# Patient Record
Sex: MALE | Race: OTHER | HISPANIC OR LATINO
[De-identification: names, ages, dates, MRNs, and addresses within clinical notes are randomized per-mention and may not be internally consistent; named-entity substitution may affect disease eponyms.]

---

## 2024-09-12 ENCOUNTER — TRANSCRIPTION ENCOUNTER (OUTPATIENT)
Age: 39
End: 2024-09-12

## 2024-09-12 ENCOUNTER — RESULT REVIEW (OUTPATIENT)
Age: 39
End: 2024-09-12

## 2024-09-13 PROBLEM — Z00.00 ENCOUNTER FOR PREVENTIVE HEALTH EXAMINATION: Status: ACTIVE | Noted: 2024-09-13

## 2024-09-14 ENCOUNTER — INPATIENT (INPATIENT)
Facility: HOSPITAL | Age: 39
LOS: 8 days | Discharge: HOME | End: 2024-09-23
Attending: HOSPITALIST | Admitting: STUDENT IN AN ORGANIZED HEALTH CARE EDUCATION/TRAINING PROGRAM
Payer: MEDICAID

## 2024-09-14 ENCOUNTER — TRANSCRIPTION ENCOUNTER (OUTPATIENT)
Age: 39
End: 2024-09-14

## 2024-09-14 VITALS — OXYGEN SATURATION: 96 % | RESPIRATION RATE: 22 BRPM | HEART RATE: 109 BPM

## 2024-09-14 LAB
ALBUMIN SERPL ELPH-MCNC: 3 G/DL — LOW (ref 3.3–5)
ALP SERPL-CCNC: 87 U/L — SIGNIFICANT CHANGE UP (ref 40–120)
ALT FLD-CCNC: 23 U/L — SIGNIFICANT CHANGE UP (ref 10–45)
ANION GAP SERPL CALC-SCNC: 10 MMOL/L — SIGNIFICANT CHANGE UP (ref 5–17)
APTT BLD: 24.7 SEC — SIGNIFICANT CHANGE UP (ref 24.5–35.6)
AST SERPL-CCNC: 15 U/L — SIGNIFICANT CHANGE UP (ref 10–40)
BASE EXCESS BLDA CALC-SCNC: -7.4 MMOL/L — LOW (ref -2–3)
BASE EXCESS BLDA CALC-SCNC: -8.3 MMOL/L — LOW (ref -2–3)
BASOPHILS # BLD AUTO: 0.01 K/UL — SIGNIFICANT CHANGE UP (ref 0–0.2)
BASOPHILS NFR BLD AUTO: 0.1 % — SIGNIFICANT CHANGE UP (ref 0–2)
BILIRUB SERPL-MCNC: 0.2 MG/DL — SIGNIFICANT CHANGE UP (ref 0.2–1.2)
BLD GP AB SCN SERPL QL: NEGATIVE — SIGNIFICANT CHANGE UP
BLD GP AB SCN SERPL QL: NEGATIVE — SIGNIFICANT CHANGE UP
BUN SERPL-MCNC: 59 MG/DL — HIGH (ref 7–23)
CALCIUM SERPL-MCNC: 9.1 MG/DL — SIGNIFICANT CHANGE UP (ref 8.4–10.5)
CHLORIDE SERPL-SCNC: 102 MMOL/L — SIGNIFICANT CHANGE UP (ref 96–108)
CK MB CFR SERPL CALC: 2.2 NG/ML — SIGNIFICANT CHANGE UP (ref 0–6.7)
CK SERPL-CCNC: 193 U/L — SIGNIFICANT CHANGE UP (ref 30–200)
CO2 BLDA-SCNC: 20 MMOL/L — SIGNIFICANT CHANGE UP (ref 19–24)
CO2 BLDA-SCNC: 21 MMOL/L — SIGNIFICANT CHANGE UP (ref 19–24)
CO2 SERPL-SCNC: 20 MMOL/L — LOW (ref 22–31)
CREAT SERPL-MCNC: 3.57 MG/DL — HIGH (ref 0.5–1.3)
EGFR: 21 ML/MIN/1.73M2 — LOW
EOSINOPHIL # BLD AUTO: 0 K/UL — SIGNIFICANT CHANGE UP (ref 0–0.5)
EOSINOPHIL NFR BLD AUTO: 0 % — SIGNIFICANT CHANGE UP (ref 0–6)
GAS PNL BLDA: SIGNIFICANT CHANGE UP
GAS PNL BLDA: SIGNIFICANT CHANGE UP
GLUCOSE BLDC GLUCOMTR-MCNC: 158 MG/DL — HIGH (ref 70–99)
GLUCOSE BLDC GLUCOMTR-MCNC: 230 MG/DL — HIGH (ref 70–99)
GLUCOSE SERPL-MCNC: 173 MG/DL — HIGH (ref 70–99)
HCO3 BLDA-SCNC: 19 MMOL/L — LOW (ref 21–28)
HCO3 BLDA-SCNC: 20 MMOL/L — LOW (ref 21–28)
HCT VFR BLD CALC: 39 % — SIGNIFICANT CHANGE UP (ref 39–50)
HGB BLD-MCNC: 12.7 G/DL — LOW (ref 13–17)
IMM GRANULOCYTES NFR BLD AUTO: 0.6 % — SIGNIFICANT CHANGE UP (ref 0–0.9)
INR BLD: 1.07 — SIGNIFICANT CHANGE UP (ref 0.85–1.18)
LACTATE SERPL-SCNC: 1 MMOL/L — SIGNIFICANT CHANGE UP (ref 0.5–2)
LYMPHOCYTES # BLD AUTO: 0.64 K/UL — LOW (ref 1–3.3)
LYMPHOCYTES # BLD AUTO: 4.8 % — LOW (ref 13–44)
MAGNESIUM SERPL-MCNC: 2.6 MG/DL — SIGNIFICANT CHANGE UP (ref 1.6–2.6)
MCHC RBC-ENTMCNC: 27.4 PG — SIGNIFICANT CHANGE UP (ref 27–34)
MCHC RBC-ENTMCNC: 32.6 GM/DL — SIGNIFICANT CHANGE UP (ref 32–36)
MCV RBC AUTO: 84.1 FL — SIGNIFICANT CHANGE UP (ref 80–100)
MONOCYTES # BLD AUTO: 0.24 K/UL — SIGNIFICANT CHANGE UP (ref 0–0.9)
MONOCYTES NFR BLD AUTO: 1.8 % — LOW (ref 2–14)
NEUTROPHILS # BLD AUTO: 12.31 K/UL — HIGH (ref 1.8–7.4)
NEUTROPHILS NFR BLD AUTO: 92.7 % — HIGH (ref 43–77)
NRBC # BLD: 0 /100 WBCS — SIGNIFICANT CHANGE UP (ref 0–0)
PCO2 BLDA: 44 MMHG — SIGNIFICANT CHANGE UP (ref 35–48)
PCO2 BLDA: 45 MMHG — SIGNIFICANT CHANGE UP (ref 35–48)
PH BLDA: 7.24 — LOW (ref 7.35–7.45)
PH BLDA: 7.25 — LOW (ref 7.35–7.45)
PHOSPHATE SERPL-MCNC: 7.7 MG/DL — HIGH (ref 2.5–4.5)
PLATELET # BLD AUTO: 409 K/UL — HIGH (ref 150–400)
PO2 BLDA: 93 MMHG — SIGNIFICANT CHANGE UP (ref 83–108)
PO2 BLDA: 97 MMHG — SIGNIFICANT CHANGE UP (ref 83–108)
POTASSIUM SERPL-MCNC: 4.6 MMOL/L — SIGNIFICANT CHANGE UP (ref 3.5–5.3)
POTASSIUM SERPL-SCNC: 4.6 MMOL/L — SIGNIFICANT CHANGE UP (ref 3.5–5.3)
PROCALCITONIN SERPL-MCNC: 4.4 NG/ML — HIGH (ref 0.02–0.1)
PROT SERPL-MCNC: 6.7 G/DL — SIGNIFICANT CHANGE UP (ref 6–8.3)
PROTHROM AB SERPL-ACNC: 12.2 SEC — SIGNIFICANT CHANGE UP (ref 9.5–13)
RBC # BLD: 4.64 M/UL — SIGNIFICANT CHANGE UP (ref 4.2–5.8)
RBC # FLD: 12.3 % — SIGNIFICANT CHANGE UP (ref 10.3–14.5)
RH IG SCN BLD-IMP: POSITIVE — SIGNIFICANT CHANGE UP
RH IG SCN BLD-IMP: POSITIVE — SIGNIFICANT CHANGE UP
SAO2 % BLDA: 98.5 % — HIGH (ref 94–98)
SAO2 % BLDA: 99 % — HIGH (ref 94–98)
SODIUM SERPL-SCNC: 132 MMOL/L — LOW (ref 135–145)
TROPONIN T, HIGH SENSITIVITY RESULT: 19 NG/L — SIGNIFICANT CHANGE UP (ref 0–51)
WBC # BLD: 13.28 K/UL — HIGH (ref 3.8–10.5)
WBC # FLD AUTO: 13.28 K/UL — HIGH (ref 3.8–10.5)

## 2024-09-14 PROCEDURE — 71250 CT THORAX DX C-: CPT | Mod: 26

## 2024-09-14 PROCEDURE — 99291 CRITICAL CARE FIRST HOUR: CPT | Mod: GC,25

## 2024-09-14 PROCEDURE — 71045 X-RAY EXAM CHEST 1 VIEW: CPT | Mod: 26

## 2024-09-14 PROCEDURE — 76705 ECHO EXAM OF ABDOMEN: CPT | Mod: 26

## 2024-09-14 PROCEDURE — 93308 TTE F-UP OR LMTD: CPT | Mod: 26

## 2024-09-14 RX ORDER — FENTANYL CITRATE-0.9 % NACL/PF 300MCG/30
0.5 PATIENT CONTROLLED ANALGESIA VIAL INJECTION
Qty: 2500 | Refills: 0 | Status: DISCONTINUED | OUTPATIENT
Start: 2024-09-14 | End: 2024-09-14

## 2024-09-14 RX ORDER — SODIUM CHLORIDE IRRIG SOLUTION 0.9 %
1000 SOLUTION, IRRIGATION IRRIGATION
Refills: 0 | Status: DISCONTINUED | OUTPATIENT
Start: 2024-09-14 | End: 2024-09-23

## 2024-09-14 RX ORDER — PREDNISONE 5 MG/1
2 TABLET ORAL
Refills: 0 | DISCHARGE

## 2024-09-14 RX ORDER — GLUCAGON INJECTION, SOLUTION 0.5 MG/.1ML
1 INJECTION, SOLUTION SUBCUTANEOUS ONCE
Refills: 0 | Status: DISCONTINUED | OUTPATIENT
Start: 2024-09-14 | End: 2024-09-23

## 2024-09-14 RX ORDER — PANTOPRAZOLE SODIUM 40 MG/1
40 TABLET, DELAYED RELEASE ORAL EVERY 24 HOURS
Refills: 0 | Status: DISCONTINUED | OUTPATIENT
Start: 2024-09-14 | End: 2024-09-16

## 2024-09-14 RX ORDER — INSULIN LISPRO 100/ML
VIAL (ML) SUBCUTANEOUS EVERY 4 HOURS
Refills: 0 | Status: DISCONTINUED | OUTPATIENT
Start: 2024-09-14 | End: 2024-09-15

## 2024-09-14 RX ORDER — FENTANYL CITRATE-0.9 % NACL/PF 300MCG/30
0.5 PATIENT CONTROLLED ANALGESIA VIAL INJECTION
Qty: 2500 | Refills: 0 | Status: DISCONTINUED | OUTPATIENT
Start: 2024-09-14 | End: 2024-09-15

## 2024-09-14 RX ORDER — PROPOFOL 10 MG/ML
50 INJECTION, EMULSION INTRAVENOUS
Qty: 1000 | Refills: 0 | Status: DISCONTINUED | OUTPATIENT
Start: 2024-09-14 | End: 2024-09-15

## 2024-09-14 RX ORDER — ALCOHOL ANTISEPTIC PADS
25 PADS, MEDICATED (EA) TOPICAL ONCE
Refills: 0 | Status: DISCONTINUED | OUTPATIENT
Start: 2024-09-14 | End: 2024-09-23

## 2024-09-14 RX ORDER — CHLORHEXIDINE GLUCONATE ORAL RINSE 1.2 MG/ML
1 SOLUTION DENTAL
Refills: 0 | Status: DISCONTINUED | OUTPATIENT
Start: 2024-09-15 | End: 2024-09-15

## 2024-09-14 RX ORDER — ALCOHOL ANTISEPTIC PADS
12.5 PADS, MEDICATED (EA) TOPICAL ONCE
Refills: 0 | Status: DISCONTINUED | OUTPATIENT
Start: 2024-09-14 | End: 2024-09-23

## 2024-09-14 RX ORDER — SODIUM CHLORIDE 0.9 % (FLUSH) 0.9 %
10 SYRINGE (ML) INJECTION
Refills: 0 | Status: DISCONTINUED | OUTPATIENT
Start: 2024-09-14 | End: 2024-09-15

## 2024-09-14 RX ORDER — METFORMIN HCL 500 MG
2 TABLET ORAL
Refills: 0 | DISCHARGE

## 2024-09-14 RX ORDER — CHLORHEXIDINE GLUCONATE ORAL RINSE 1.2 MG/ML
15 SOLUTION DENTAL EVERY 12 HOURS
Refills: 0 | Status: DISCONTINUED | OUTPATIENT
Start: 2024-09-14 | End: 2024-09-15

## 2024-09-14 RX ORDER — POLYVINYL ALCOHOL 1 %
1 DROPS OPHTHALMIC (EYE)
Refills: 0 | Status: DISCONTINUED | OUTPATIENT
Start: 2024-09-14 | End: 2024-09-23

## 2024-09-14 RX ORDER — AMLODIPINE BESYLATE 5 MG
1 TABLET ORAL
Refills: 0 | DISCHARGE

## 2024-09-14 RX ORDER — PROPOFOL 10 MG/ML
40 INJECTION, EMULSION INTRAVENOUS
Qty: 1000 | Refills: 0 | Status: DISCONTINUED | OUTPATIENT
Start: 2024-09-14 | End: 2024-09-14

## 2024-09-14 RX ORDER — DOBUTAMINE HCL 250MG/20ML
2.5 VIAL (ML) INTRAVENOUS
Qty: 500 | Refills: 0 | Status: DISCONTINUED | OUTPATIENT
Start: 2024-09-14 | End: 2024-09-15

## 2024-09-14 RX ORDER — CHLORHEXIDINE GLUCONATE ORAL RINSE 1.2 MG/ML
1 SOLUTION DENTAL
Refills: 0 | Status: DISCONTINUED | OUTPATIENT
Start: 2024-09-14 | End: 2024-09-23

## 2024-09-14 RX ORDER — PROPOFOL 10 MG/ML
39.96 INJECTION, EMULSION INTRAVENOUS
Qty: 1000 | Refills: 0 | Status: DISCONTINUED | OUTPATIENT
Start: 2024-09-14 | End: 2024-09-14

## 2024-09-14 RX ORDER — ALCOHOL ANTISEPTIC PADS
15 PADS, MEDICATED (EA) TOPICAL ONCE
Refills: 0 | Status: DISCONTINUED | OUTPATIENT
Start: 2024-09-14 | End: 2024-09-23

## 2024-09-14 RX ADMIN — Medication 7500 UNIT(S): at 21:53

## 2024-09-14 RX ADMIN — PANTOPRAZOLE SODIUM 40 MILLIGRAM(S): 40 TABLET, DELAYED RELEASE ORAL at 21:52

## 2024-09-14 RX ADMIN — Medication 5.49 MICROGRAM(S)/KG/HR: at 21:33

## 2024-09-14 RX ADMIN — Medication 4: at 23:03

## 2024-09-14 RX ADMIN — PROPOFOL 32.9 MICROGRAM(S)/KG/MIN: 10 INJECTION, EMULSION INTRAVENOUS at 23:02

## 2024-09-14 RX ADMIN — PROPOFOL 26.3 MICROGRAM(S)/KG/MIN: 10 INJECTION, EMULSION INTRAVENOUS at 20:24

## 2024-09-14 NOTE — H&P ADULT - HISTORY OF PRESENT ILLNESS
HPI:    38-year-old male hypertension, DM2, HLD, hepatitis presents to the 9/10 ER for cough for the past 2 weeks. 2wks ago he began having cough and "gurgling" sound in his chest while breathing. Cough has now become productive of yellow sputum that sometimes has a red tint due to sucking on red cough drops, but no overt hemoptysis. Pt has also had runny nose and   subjective fevers during this time, but was afebrile in clinic and in ED. Patient states when he was walking around today he felt short of breath with exertion which is not normal for him so [facility], tested him for [facility] (negative), and sent him to the ER for evaluation. Patient bought a home pulse oximeter that ranged from 89 to 93% this week. Patient states cough is worse with lying flat and on his left side. Patient had a subjective fever at home for the past 4 days. No sick contacts or recent travel. No chest pain, abdominal pain, vomiting, diarrhea, change in urination, headache, dizziness.    In the ED:  Initial vital signs: T: 98.3 F, HR: 107, BP: 173/107, R: 18, SpO2: 93% on RA  ED course:   Labs: significant for WBC 10.83, Hgb 14.8, platelet 327, aPTT 28.7, d-dimer <215, lactic acid 1.4, Na 135, K 3.4, AG 11, BUN 10, Cr 0.7, ,    Imaging:  CXR: patchy infiltrates c/w multifocal pna  EKG:  Medications: Zosyn x1, Vancox1, Mag sulfate 100ml x1  Consults: none    HPI:    38-year-old male hypertension, DM2, HLD, hepatitis presents to the 9/10 ER for cough for the past 2 weeks. 2wks ago he began having cough and "gurgling" sound in his chest while breathing. Cough has now become productive of yellow sputum that sometimes has a red tint due to sucking on red cough drops, but no overt hemoptysis. Pt has also had runny nose and   subjective fevers during this time, but was afebrile in clinic and in ED. Patient states when he was walking around today he felt short of breath with exertion which is not normal for him so [facility], tested him for [facility] (negative), and sent him to the ER for evaluation. Patient bought a home pulse oximeter that ranged from 89 to 93% this week. Patient states cough is worse with lying flat and on his left side. Patient had a subjective fever at home for the past 4 days. No sick contacts or recent travel. No chest pain, abdominal pain, vomiting, diarrhea, change in urination, headache, dizziness, In the ED the patient was found to have a multifocal pneumonia and started on treatment for community-acquired pneumonia. He was initially on nasal cannula and was on 3 L 9/10 but by 9/11 his oxygen requirements were steadily increasing the patient was now on high flow nasal cannula between 60 to 80% FiO2. PaO2 on ABG on 3 L nasal cannula had a PaO2 of 50, however his oxygenation worsened his PaO2 on 80% FiO2 was only 64. CT chest 9/11 demonstrated extensive multifocal consolidations. Given rapid progression and worsening hypoxia, Admitted to [place] ICU due to rapid progression and worsening hypoxia, initially on continuous BIPAP. Given worsening respiratory failure was intubated (9/12). Previously considered for possible transfer to [facility] for ECMO but deferred after improving respiratory status. 9/12 Pt has been weaned off paralytics and s/p proning and   continued to be on mechanical ventilation and vasopressors. Due to patient's age and severe cardiomyopathy w. worsening renal function, transferred to [facility] for continued management, possible cath and LV support if needed.      In the [place] ED:  Initial vital signs: T: 98.3 F, HR: 107, BP: 173/107, R: 18, SpO2: 93% on RA  ED course:   Labs: significant for WBC 10.83, Hgb 14.8, platelet 327, aPTT 28.7, d-dimer <215, lactic acid 1.4, Na 135, K 3.4, AG 11, BUN 10, Cr 0.7, ,    Imaging:  CXR: patchy infiltrates c/w multifocal pna  Medications: Zosyn x1, Vancox1, Mag sulfate 100ml x1  Consults: none    HPI:    38-year-old male hypertension, DM2, HLD, hepatitis presents to the 9/10 ER for cough for the past 2 weeks. 2wks ago he began having cough and "gurgling" sound in his chest while breathing. Cough has now become productive of yellow sputum that sometimes has a red tint due to sucking on red cough drops, but no overt hemoptysis. Pt has also had runny nose and subjective fevers during this time, but was afebrile in clinic and in ED. Patient states when he was walking around today he felt short of breath with exertion which is not normal for him so [facility], tested him for COVID (negative), and sent him to the ER for evaluation. Patient bought a home pulse oximeter that ranged from 89 to 93% this week. Patient states cough is worse with lying flat and on his left side. Patient had a subjective fever at home for the past 4 days. No sick contacts or recent travel. No chest pain, abdominal pain, vomiting, diarrhea, change in urination, headache, dizziness, In the ED the patient was found to have a multifocal pneumonia and started on treatment for community-acquired pneumonia. He was initially on nasal cannula and was on 3 L 9/10 but by 9/11 his oxygen requirements were steadily increasing the patient was now on high flow nasal cannula between 60 to 80% FiO2. PaO2 on ABG on 3 L nasal cannula had a PaO2 of 50, however his oxygenation worsened his PaO2 on 80% FiO2 was only 64. CT chest 9/11 demonstrated extensive multifocal consolidations. Given rapid progression and worsening hypoxia, Admitted to [place] ICU due to rapid progression and worsening hypoxia, initially on continuous BIPAP. Given worsening respiratory failure was intubated (9/12). Previously considered for possible transfer to [facility] for ECMO but deferred after improving respiratory status. 9/12 Pt has been weaned off paralytics and s/p proning 18 hours 9/12-13. Pulm following and thought to have dx of severe CAP; also consider cryptogenic organizing pneumonia vs. PJP - and recommended steroids and antibiotics, Ceftriaxone (9/10- discharge), azithromycin (9/10- 12), bactrim (9/11- 12), IV solumedrol (9/11- discharge). Bronchoscopy 9/13- awaiting results. Labs: HIV test non reactive. Pending sputum cultures, urine cultures, legionella culture- neg.. RVP: negative. HgA1c 12. 1. Galactomannan and beta-D-glucan testing pending. On 9/14, the patient was intubated, sedated, with decreased urine output and anuric. Patient developed cardiogenic shock.Echos showed EF 25% with global left ventricular hypokinesis. Patient requires Levophed and dobutamine for hemodynamic support. Started on an insulin drip for glycemic control. ARF developed likely 2/2 to possible cardiogenic shock. Creatinine 0.6 on 9/10 -> 2.1 -> 3.4 on 9/14.Due to patient's age and severe cardiomyopathy w. worsening renal function, transferred to [facility] for continued management, possible cath and LV support if needed.       In the [place] ED:  Initial vital signs: T: 98.3 F, HR: 107, BP: 173/107, R: 18, SpO2: 93% on RA  ED course:   Labs: significant for WBC 10.83, Hgb 14.8, platelet 327, aPTT 28.7, d-dimer <215, lactic acid 1.4, Na 135, K 3.4, AG 11, BUN 10, Cr 0.7, ,    Imaging:  CXR: patchy infiltrates c/w multifocal pna  Medications: Zosyn x1, Vancox1, Mag sulfate 100ml x1  Consults: none    HPI: Information obtained via chart review. Patient arrived intubated/sedated.    38-year-old male hypertension, DM2, HLD, hepatitis presents to the 9/10 ER for cough for the past 2 weeks. 2wks ago he began having cough and "gurgling" sound in his chest while breathing. Cough has now become productive of yellow sputum that sometimes has a red tint due to sucking on red cough drops, but no overt hemoptysis. Pt has also had runny nose and subjective fevers during this time, but was afebrile in clinic and in ED. Patient states when he was walking around today he felt short of breath with exertion which is not normal for him so [facility], tested him for COVID (negative), and sent him to the ER for evaluation. Patient bought a home pulse oximeter that ranged from 89 to 93% this week. Patient states cough is worse with lying flat and on his left side. Patient had a subjective fever at home for the past 4 days. No sick contacts or recent travel. No chest pain, abdominal pain, vomiting, diarrhea, change in urination, headache, dizziness, In the ED the patient was found to have a multifocal pneumonia and started on treatment for community-acquired pneumonia. He was initially on nasal cannula and was on 3 L 9/10 but by 9/11 his oxygen requirements were steadily increasing the patient was now on high flow nasal cannula between 60 to 80% FiO2. PaO2 on ABG on 3 L nasal cannula had a PaO2 of 50, however his oxygenation worsened his PaO2 on 80% FiO2 was only 64. CT chest 9/11 demonstrated extensive multifocal consolidations. Given rapid progression and worsening hypoxia, Admitted to [place] ICU due to rapid progression and worsening hypoxia, initially on continuous BIPAP. Given worsening respiratory failure was intubated (9/12). Previously considered for possible transfer to [facility] for ECMO but deferred after improving respiratory status. 9/12 Pt has been weaned off paralytics and s/p proning 18 hours 9/12-13. Pulm following and thought to have dx of severe CAP; also consider cryptogenic organizing pneumonia vs. PJP - and recommended steroids and antibiotics, Ceftriaxone (9/10- discharge), azithromycin (9/10- 12), bactrim (9/11- 12), IV solumedrol (9/11- discharge). Bronchoscopy 9/13- awaiting results. Labs: HIV test non reactive. Pending sputum cultures, urine cultures, legionella culture- neg.. RVP: negative. HgA1c 12. 1. Galactomannan and beta-D-glucan testing pending. Decreased urine output and anuric. Patient developed cardiogenic shock. Echo showed EF 25% with global left ventricular hypokinesis. Patient requires Levophed and dobutamine for hemodynamic support. Started on an insulin drip for glycemic control. ARF developed likely 2/2 to possible cardiogenic shock, on dobutamine. Creatinine 0.6 on 9/10 -> 2.1 -> 3.4 on 9/14.Due to patient's age and severe cardiomyopathy w. worsening renal function, transferred to [facility] for continued management, possible cath and LV support if needed.       In the [place] ED:  Initial vital signs: T: 98.3 F, HR: 107, BP: 173/107, R: 18, SpO2: 93% on RA  ED course:   Labs: significant for WBC 10.83, Hgb 14.8, platelet 327, aPTT 28.7, d-dimer <215, lactic acid 1.4, Na 135, K 3.4, AG 11, BUN 10, Cr 0.7, ,    Imaging:  CXR: patchy infiltrates c/w multifocal pna  Medications: Zosyn x1, Vancox1, Mag sulfate 100ml x1  Consults: none    HPI: Information obtained via chart review. Patient arrived intubated/sedated.    38-year-old male hypertension, DM2, HLD, hepatitis presents to the 9/10 ER for cough for the past 2 weeks. 2wks ago he began having cough and "gurgling" sound in his chest while breathing. Cough has now become productive of yellow sputum that sometimes has a red tint due to sucking on red cough drops, but no overt hemoptysis. Pt has also had runny nose and subjective fevers during this time, but was afebrile in clinic and in ED. Patient states when he was walking around today he felt short of breath with exertion which is not normal for him [facility], tested him for COVID (negative), and sent him to the ER for evaluation. Patient bought a home pulse oximeter that ranged from 89 to 93% this week. Patient states cough is worse with lying flat and on his left side. Patient had a subjective fever at home for the past 4 days. No sick contacts or recent travel. No chest pain, abdominal pain, vomiting, diarrhea, change in urination, headache, dizziness, In the ED the patient was found to have a multifocal pneumonia and started on treatment for community-acquired pneumonia. He was initially on nasal cannula and was on 3 L 9/10 but by 9/11 his oxygen requirements were steadily increasing the patient was now on high flow nasal cannula between 60 to 80% FiO2. PaO2 on ABG on 3 L nasal cannula had a PaO2 of 50, however his oxygenation worsened his PaO2 on 80% FiO2 was only 64. CT chest 9/11 demonstrated extensive multifocal consolidations. Given rapid progression and worsening hypoxia, Admitted to [place] ICU due to rapid progression and worsening hypoxia, initially on continuous BIPAP. Given worsening respiratory failure was intubated (9/12). Previously considered for possible transfer to [facility] for ECMO but deferred after improving respiratory status. 9/12 Pt has been weaned off paralytics and s/p proning 18 hours 9/12-13. Pulm following and thought to have dx of severe CAP; also consider cryptogenic organizing pneumonia vs. PJP - and recommended steroids and antibiotics, Ceftriaxone (9/10- discharge), azithromycin (9/10- 12), bactrim (9/11- 12), IV solumedrol (9/11- discharge). Bronchoscopy 9/13- awaiting results. Labs: HIV test non reactive. Pending sputum cultures, urine cultures, legionella culture- neg.. RVP: negative. HgA1c 12. 1. Galactomannan and beta-D-glucan testing pending. Decreased urine output and anuric. Patient developed cardiogenic shock. Echo showed EF 25% with global left ventricular hypokinesis. Patient requires Levophed and dobutamine for hemodynamic support. Started on an insulin drip for glycemic control. ARF developed likely 2/2 to possible cardiogenic shock. Creatinine 0.6 on 9/10 -> 2.1 -> 3.4 on 9/14.Due to patient's age and severe cardiomyopathy w. worsening renal function, transferred to [facility] for continued management, possible cath and LV support if needed.       In the [place] ED:  Initial vital signs: T: 98.3 F, HR: 107, BP: 173/107, R: 18, SpO2: 93% on RA  ED course:   Labs: significant for WBC 10.83, Hgb 14.8, platelet 327, aPTT 28.7, d-dimer <215, lactic acid 1.4, Na 135, K 3.4, AG 11, BUN 10, Cr 0.7, ,    Imaging:  CXR: patchy infiltrates c/w multifocal pna  Medications: Zosyn x1, Vancox1, Mag sulfate 100ml x1   HPI: Information obtained via chart review. Patient arrived intubated/sedated.    38-year-old male hypertension, DM2, HLD, hepatitis presents to the 9/10 ER for cough for the past 2 weeks. 2wks ago he began having cough and "gurgling" sound in his chest while breathing. Cough has now become productive of yellow sputum that sometimes has a red tint due to sucking on red cough drops, but no overt hemoptysis. Pt has also had runny nose and subjective fevers during this time, but was afebrile in clinic and in ED. Patient states when he was walking around today he felt short of breath with exertion which is not normal for him so [facility], tested him for COVID (negative), and sent him to the ER for evaluation. Patient bought a home pulse oximeter that ranged from 89 to 93% this week. Patient states cough is worse with lying flat and on his left side. Patient had a subjective fever at home for the past 4 days. No sick contacts or recent travel. No chest pain, abdominal pain, vomiting, diarrhea, change in urination, headache, dizziness, In the ED the patient was found to have a multifocal pneumonia and started on treatment for community-acquired pneumonia. He was initially on nasal cannula and was on 3 L 9/10 but by 9/11 his oxygen requirements were steadily increasing the patient was now on high flow nasal cannula between 60 to 80% FiO2. PaO2 on ABG on 3 L nasal cannula had a PaO2 of 50, however his oxygenation worsened his PaO2 on 80% FiO2 was only 64. CT chest 9/11 demonstrated extensive multifocal consolidations. Given rapid progression and worsening hypoxia, Admitted to [place] ICU due to rapid progression and worsening hypoxia, initially on continuous BIPAP. Given worsening respiratory failure was intubated (9/12). Previously considered for possible transfer to [facility] for ECMO but deferred after improving respiratory status. 9/12 Pt has been weaned off paralytics and s/p proning 18 hours 9/12-13. Pulm following and thought to have dx of severe CAP; also consider cryptogenic organizing pneumonia vs. PJP - and recommended steroids and antibiotics, Ceftriaxone (9/10- discharge), azithromycin (9/10- 12), bactrim (9/11- 12), IV solumedrol (9/11- discharge). Bronchoscopy 9/13- awaiting results. Labs: HIV test non reactive. Pending sputum cultures, urine cultures, legionella culture- neg.. RVP: negative. HgA1c 12. 1. Galactomannan and beta-D-glucan testing pending. Decreased urine output and anuric. Patient developed cardiogenic shock. Echo showed EF 25% with global left ventricular hypokinesis. Patient requires Levophed and dobutamine for hemodynamic support. Started on an insulin drip for glycemic control. ARF developed likely 2/2 to possible cardiogenic shock. Creatinine 0.6 on 9/10 -> 2.1 -> 3.4 on 9/14.Due to patient's age and severe cardiomyopathy w. worsening renal function, transferred to [facility] for continued management, possible cath and LV support if needed.       In the [place] ED:  Initial vital signs: T: 98.3 F, HR: 107, BP: 173/107, R: 18, SpO2: 93% on RA  ED course:   Labs: significant for WBC 10.83, Hgb 14.8, platelet 327, aPTT 28.7, d-dimer <215, lactic acid 1.4, Na 135, K 3.4, AG 11, BUN 10, Cr 0.7, ,    Imaging:  CXR: patchy infiltrates c/w multifocal pna  Medications: Zosyn x1, Vancox1, Mag sulfate 100ml x1    PCP: Bi HPI: Information obtained via chart review. Patient arrived intubated/sedated.    38-year-old male hypertension, DM2, HLD, hepatitis presents to the 9/10 ER for cough for the past 2 weeks. 2wks ago he began having cough and "gurgling" sound in his chest while breathing. Cough has now become productive of yellow sputum that sometimes has a red tint due to sucking on red cough drops, but no overt hemoptysis. Pt has also had runny nose and subjective fevers during this time, but was afebrile in clinic and in ED. Patient states when he was walking around today he felt short of breath with exertion which is not normal for him [facility], tested him for COVID (negative), and sent him to the ER for evaluation. Patient bought a home pulse oximeter that ranged from 89 to 93% this week. Patient states cough is worse with lying flat and on his left side. Patient had a subjective fever at home for the past 4 days. No sick contacts or recent travel. No chest pain, abdominal pain, vomiting, diarrhea, change in urination, headache, dizziness, In the ED the patient was found to have a multifocal pneumonia and started on treatment for community-acquired pneumonia. He was initially on nasal cannula and was on 3 L 9/10 but by 9/11 his oxygen requirements were steadily increasing the patient was now on high flow nasal cannula between 60 to 80% FiO2. PaO2 on ABG on 3 L nasal cannula had a PaO2 of 50, however his oxygenation worsened his PaO2 on 80% FiO2 was only 64. CT chest 9/11 demonstrated extensive multifocal consolidations. Given rapid progression and worsening hypoxia, Admitted to [place] ICU due to rapid progression and worsening hypoxia, initially on continuous BIPAP. Given worsening respiratory failure was intubated (9/12). Previously considered for possible transfer to [facility] for ECMO but deferred after improving respiratory status. 9/12 Pt has been weaned off paralytics and s/p proning 18 hours 9/12-13. Pulm following and thought to have dx of severe CAP; also consider cryptogenic organizing pneumonia vs. PJP - and recommended steroids and antibiotics, Ceftriaxone (9/10- discharge), azithromycin (9/10- 12), bactrim (9/11- 12), IV solumedrol (9/11- discharge). Bronchoscopy 9/13- awaiting results. Labs: HIV test non reactive. Pending sputum cultures, urine cultures, legionella culture- neg.. RVP: negative. HgA1c 12. 1. Galactomannan and beta-D-glucan testing pending. Decreased urine output and anuric. Patient developed cardiogenic shock. Echo showed EF 25% with global left ventricular hypokinesis. Patient requires Levophed and dobutamine for hemodynamic support. Started on an insulin drip for glycemic control. ARF developed likely 2/2 to possible cardiogenic shock. Creatinine 0.6 on 9/10 -> 2.1 -> 3.4 on 9/14.Due to patient's age and severe cardiomyopathy w. worsening renal function, transferred to [facility] for continued management, possible cath and LV support if needed.       In the [place] ED:  Initial vital signs: T: 98.3 F, HR: 107, BP: 173/107, R: 18, SpO2: 93% on RA  ED course:   Labs: significant for WBC 10.83, Hgb 14.8, platelet 327, aPTT 28.7, d-dimer <215, lactic acid 1.4, Na 135, K 3.4, AG 11, BUN 10, Cr 0.7, ,    Imaging:  CXR: patchy infiltrates c/w multifocal pna  Medications: Zosyn x1, Vancox1, Mag sulfate 100ml x1    PCP: Bi [phone] HPI: Information obtained via chart review. Patient arrived intubated/sedated.    38-year-old male hypertension, DM2, HLD, hepatitis presents to the 9/10 ER for cough for the past 2 weeks. 2wks ago he began having cough and "gurgling" sound in his chest while breathing. Cough has now become productive of yellow sputum that sometimes has a red tint due to sucking on red cough drops, but no overt hemoptysis. Pt has also had runny nose and subjective fevers during this time, but was afebrile in clinic and in ED. Patient states when he was walking around today he felt short of breath with exertion which is not normal for him so [facility], tested him for COVID (negative), and sent him to the ER for evaluation. Patient bought a home pulse oximeter that ranged from 89 to 93% this week. Patient states cough is worse with lying flat and on his left side. Patient had a subjective fever at home for the past 4 days. No sick contacts or recent travel. No chest pain, abdominal pain, vomiting, diarrhea, change in urination, headache, dizziness, In the ED the patient was found to have a multifocal pneumonia and started on treatment for community-acquired pneumonia. He was initially on nasal cannula and was on 3 L 9/10 but by 9/11 his oxygen requirements were steadily increasing the patient was now on high flow nasal cannula between 60 to 80% FiO2. PaO2 on ABG on 3 L nasal cannula had a PaO2 of 50, however his oxygenation worsened his PaO2 on 80% FiO2 was only 64. CT chest 9/11 demonstrated extensive multifocal consolidations. Given rapid progression and worsening hypoxia, Admitted to [place] ICU due to rapid progression and worsening hypoxia, initially on continuous BIPAP. Given worsening respiratory failure was intubated (9/12). Previously considered for possible transfer to [facility] for ECMO but deferred after improving respiratory status. 9/12 Pt has been weaned off paralytics and s/p proning 18 hours 9/12-13. Pulm following and thought to have dx of severe CAP; also consider cryptogenic organizing pneumonia vs. PJP - and recommended steroids and antibiotics, Ceftriaxone (9/10- discharge), azithromycin (9/10- 12), bactrim (9/11- 12), IV solumedrol (9/11- discharge). Bronchoscopy 9/13- awaiting results. Labs: HIV test non reactive. Pending sputum cultures, urine cultures, legionella culture- neg.. RVP: negative. HgA1c 12. 1. Galactomannan and beta-D-glucan testing pending. Reintubated 9/14, per primary team, decreased urine output and anuric. Patient developed cardiogenic shock. Echo showed EF 25% with global left ventricular hypokinesis. Patient requires Levophed and dobutamine for hemodynamic support. Started on an insulin drip for glycemic control. ARF developed likely 2/2 to possible cardiogenic shock. Creatinine 0.6 on 9/10 -> 2.1 -> 3.4 on 9/14.Due to patient's age and severe cardiomyopathy w. worsening renal function, transferred to [facility] for continued management, possible cath and LV support if needed.       In the [place] ED:  Initial vital signs: T: 98.3 F, HR: 107, BP: 173/107, R: 18, SpO2: 93% on RA  ED course:   Labs: significant for WBC 10.83, Hgb 14.8, platelet 327, aPTT 28.7, d-dimer <215, lactic acid 1.4, Na 135, K 3.4, AG 11, BUN 10, Cr 0.7, ,    Imaging:  CXR: patchy infiltrates c/w multifocal pna  Medications: Zosyn x1, Vancox1, Mag sulfate 100ml x1    PCP: Bi [phone] HPI: Information obtained via chart review. Patient arrived intubated/sedated.    38yoM with a PMHx of HTN, DM2 (uncontrolled, A1c 12), HLD, hepatitis initially presented to  9/10 ER for cough for the past 2 weeks. 2wks ago he began having cough and "gurgling" sound in his chest while breathing. Cough has now become productive of yellow sputum that sometimes has a red tint due to sucking on red cough drops, but no overt hemoptysis. Pt has also had runny nose and subjective fevers during this time, but was afebrile in clinic and in ED. Patient states when he was walking around today he felt short of breath with exertion which is not normal for him so [facility], tested him for COVID (negative), and sent him to the ER for evaluation. Patient bought a home pulse oximeter that ranged from 89 to 93% this week. Patient states cough is worse with lying flat and on his left side. Patient had a subjective fever at home for the past 4 days. No sick contacts or recent travel. No chest pain, abdominal pain, vomiting, diarrhea, change in urination, headache, dizziness, In the ED the patient was found to have a multifocal pneumonia and started on treatment for community-acquired pneumonia. He was initially on nasal cannula and was on 3 L 9/10 but by 9/11 his oxygen requirements were steadily increasing the patient was now on high flow nasal cannula between 60 to 80% FiO2. PaO2 on ABG on 3 L nasal cannula had a PaO2 of 50, however his oxygenation worsened his PaO2 on 80% FiO2 was only 64. CT chest 9/11 demonstrated extensive multifocal consolidations. Given rapid progression and worsening hypoxia, Admitted to [facility] ICU due to rapid progression and worsening hypoxia, initially on continuous BIPAP. Given worsening respiratory failure was intubated (9/12). Previously considered for possible transfer to [facility] for ECMO but deferred after improving respiratory status. 9/12 Pt has been weaned off paralytics and s/p proning 18 hours 9/12-13. Pulm following and thought to have dx of severe CAP; also consider cryptogenic organizing pneumonia vs. PJP - and recommended steroids and antibiotics, Ceftriaxone (9/10- discharge), azithromycin (9/10- 12), bactrim (9/11- 12), IV solumedrol (9/11- discharge). Bronchoscopy 9/13- awaiting results. Labs: HIV test non reactive. Pending sputum cultures, urine cultures, legionella culture- neg.. RVP: negative. HgA1c 12. 1. Galactomannan and beta-D-glucan testing pending. Reintubated 9/14, per primary team, decreased urine output and anuric. Patient developed cardiogenic shock. Echo showed EF 25% with global left ventricular hypokinesis. Patient requires Levophed and dobutamine for hemodynamic support. Started on an insulin drip for glycemic control. ARF developed likely 2/2 to possible cardiogenic shock. Creatinine 0.6 on 9/10 -> 2.1 -> 3.4 on 9/14.Due to patient's age and severe cardiomyopathy w. worsening renal function, transferred to [facility] for continued management, possible cath and LV support if needed.       In the [facility] ED:  Initial vital signs: T: 98.3 F, HR: 107, BP: 173/107, R: 18, SpO2: 93% on RA  ED course:   Labs: significant for WBC 10.83, Hgb 14.8, platelet 327, aPTT 28.7, d-dimer <215, lactic acid 1.4, Na 135, K 3.4, AG 11, BUN 10, Cr 0.7, ,    Imaging:  CXR: patchy infiltrates c/w multifocal pna  Medications: Zosyn x1, Vancox1, Mag sulfate 100ml x1    PCP: Bi [phone] HPI: Information obtained via chart review. Patient arrived intubated/sedated.    38yoM with a PMHx of HTN, DM2 (uncontrolled, A1c 12), HLD, hepatitis initially presented to [facility] 9/10 ER for cough for 2 weeks. Reported cough productive of yellow sputum, runny nose, subjective fevers as well and PERRY. [facility], tested him for COVID (negative), and sent him to the ER. Patient bought a home pulse oximeter that ranged from 89 to 93% this week. In the ED, the patient was found to have a multifocal pneumonia and started on treatment for CAP. He was initially on 3LNC on 9/10 but on  O2 req increased > HFNC. CT Chest  extensive multifocal consolidations > transferred to ICU > BiPAP > intubated . Treated with ARDS protocol, paralyzed, proned -. Pulm consulted, ddx severe CAP vs  vs PJP. Treated with CTX 9/10-, azithro 9/10-, bactrim -, iv solumedrol -. Bronch , pending results. HIV neg. Reintubated  for ??. Also noted to have decreased UOP and ?anuric. Course further c/b newly reduced EF 25% and cardiogenic shock req dobutamine.  PCP: Bi [phone]    Upon admission to [facility]:   Vitals: T__, , /75, RR 22, 96% on vent FiO2 50//RR 18/PEEP 5  Labs: WBC 13.28, Hb 12.7, Na 132, BUN 59, Cr 3.57, Procal 4.4  AB.24/44/19

## 2024-09-14 NOTE — H&P ADULT - ATTENDING COMMENTS
Patient transfer from [place] originally admitted for severe ARDS with now new reduction in cardiac function. US on admission shows bilateral A line pattern with minimal consolidations at the bases. Oxygenation is much improved with cardiac funciton more moderately reduced on 2.5 of dobutamine. Hess was found to be blocked so exchanged with output of moderate amount of urine. Will plan to repeat CT scan but based on imagining and rapid reponse to steroids favor a process such as acute eosinphilic pneumonia but will repeat CTD workup and consider bronchoscopy based on results of CT scan and clinical picture. Continue 1mg/kg steroids

## 2024-09-14 NOTE — H&P ADULT - NSHPPHYSICALEXAM_GEN_ALL_CORE
GENERAL: intubated/sedated  HEENT: Atraumatic, normocephalic  NEURO:  A&Ox0, no focal deficits  LUNGS: diminished lung sounds  HEART: RRR  ABD: Soft, non-tender, non-distended, no organomegaly, no appreciable masses, +bs all 4 quadrants  EXTREMITIES: Nontender, +1 pitting edema  SKIN: No rashes or lesions

## 2024-09-14 NOTE — H&P ADULT - NSHPLABSRESULTS_GEN_ALL_CORE
.  LABS:                         12.7   13.28 )-----------( 409      ( 14 Sep 2024 19:04 )             39.0           PT/INR - ( 14 Sep 2024 19:04 )   PT: 12.2 sec;   INR: 1.07          PTT - ( 14 Sep 2024 19:04 )  PTT:24.7 sec          Lactate, Blood: 1.0 mmol/L (09-14 @ 19:04)      RADIOLOGY, EKG & ADDITIONAL TESTS: Reviewed.

## 2024-09-14 NOTE — H&P ADULT - NSICDXPASTMEDICALHX_GEN_ALL_CORE_FT
PAST MEDICAL HISTORY:  Alcohol use     Diabetes     HLD (hyperlipidemia)     HTN (hypertension)

## 2024-09-14 NOTE — H&P ADULT - ASSESSMENT
Patient is 38yMale with PMHx of *** who presents with ***.     NEURO  #intubated and sedated   - plan for extubation tomorrow AM    CV  #      PULM  #  - CT scan without contrast   - f/u anca and immunoglobulin labs        RENAL  #  #Hess      GI  #      ENDO  #      ID  #      HEME/ONC  #      ID:  #      SKIN:  #Lines:      PREVENTIVE   F:  E:  N:  GI:  DVT:  DISPO:       38-year-old male hypertension, DM2, HLD, hepatitis presents with acute hypoxic respiratory failure 2/2 pneumonia, cardiogenic shock, and DIANDRA from [facility] for continued management.     NEURO  #intubated and sedated   - plan for extubation tomorrow AM    CV  #cardiogenic shock  Echo showed EF 25% with global left ventricular hypokinesis. Patient requires Levophed and dobutamine  - Repeat TTE  - Continue levophed and dobutamine 2.5mg, wean as tolerated; plan to dc tomorow AM    PULM  #acute hypoxic respiratory failure 2/2 pneumonia vs   - CT scan without contrast   - f/u anca and immunoglobulin labs  - hold antibiotics at this time      RENAL  #DIANDRA  #Hess exchanged 9/14      GI  #place NG tube  - Consider starting feeds  - PPI       ENDO  #DM, A1c 12%  - discontinue insulin gtt  - FSG q6hrs      ID  HATTIE      HEME/ONC  #      ID:  #      SKIN:  #Lines:      PREVENTIVE   F:  E:  N:  GI: PPI  DVT: heparin subq  DISPO:       38-year-old male hypertension, DM2, HLD, hepatitis presents with acute hypoxic respiratory failure 2/2 pneumonia, cardiogenic shock, and DIANDRA from [facility] for continued management.     NEURO  #intubated and sedated   - plan for extubation tomorrow AM    CV  #cardiogenic shock  Echo showed EF 25% with global left ventricular hypokinesis. Patient requires Levophed and dobutamine  - Repeat TTE  - Continue levophed and dobutamine 2.5mg, wean as tolerated; plan to dc tomorow AM    #HTN  Per chart review, home lisinopril 40mg and HCTZ but has not taken for past 2 months    #HLD  Per chart review, on a statin (unknown which one) but not taking past 2 months.    PULM  #acute hypoxic respiratory failure 2/2 pneumonia vs   - CT scan without contrast   - f/u anca and immunoglobulin labs  - hold antibiotics at this time      RENAL  #DIANDRA  #Hess exchanged 9/14      GI  #place NG tube  - Consider starting feeds  - PPI       ENDO  #DM  [place] Admission A1c 12%  Per chart review, Home metformin 1000mg BID but has not taken for past 2 months  - discontinue insulin gtt  - FSG q6hrs      ID  HATTIE      HEME/ONC  #DVT prophylaxis   - heparin subq        SKIN:  #Lines:      PREVENTIVE   F: as needed  E: Replete if K<4, Mg<2  N: NPO, possibly start tube feeds  GI: PPI  DVT: heparin subq  DISPO: MICU       38-year-old male hypertension, DM2, HLD, hepatitis presents with acute hypoxic respiratory failure 2/2 pneumonia, cardiogenic shock, and DIANDRA from [facility] for continued management.     NEURO  #intubated and sedated   - plan for extubation tomorrow AM    CV  #cardiogenic shock  Echo showed EF 25% with global left ventricular hypokinesis. Patient requires Levophed and dobutamine  - Repeat TTE  - Continue levophed and dobutamine 2.5mg, wean as tolerated; plan to dc tomorow AM    #HTN  Per chart review, home lisinopril 40mg and HCTZ 25mg but has not taken for past 2 months    #HLD  Per chart review, on a statin (unknown which one) but not taking past 2 months.    PULM  #acute hypoxic respiratory failure   - CT scan without contrast   - f/u anca and immunoglobulin labs, blood parasites, stool parasite, strongyloides antibodies  - hold antibiotics at this time      RENAL  #DIANDRA  #Hess exchanged 9/14      GI  #place NG tube  - Consider starting feeds  - PPI       ENDO  #DM  [place] Admission A1c 12%  Per chart review, Home metformin 1000mg BID but has not taken for past 2 months  - discontinue insulin gtt  - FSG q6hrs      ID  HATTIE      HEME/ONC  #DVT prophylaxis   - heparin subq  - maintain active T&S  - transfuse if Hgb <7        SKIN:  #Lines:      PREVENTIVE   F: as needed  E: Replete if K<4, Mg<2  N: NPO, possibly start tube feeds  GI: PPI  DVT: heparin subq  DISPO: MICU       38-year-old male hypertension, DM2, HLD, hepatitis presents with acute hypoxic respiratory failure 2/2 hypersensitivity pneumonitis vs eosinophilic pneumonia, cardiogenic shock, and DIANDRA from [facility] for continued management.     NEURO  #intubated and sedated , on propofol and fentanyl.  - plan for extubation tomorrow AM    CV  #cardiogenic shock  Echo showed EF 25% with global left ventricular hypokinesis.  On levophed and dobutamine  9/14 POCUS with global hypokinesis  - Repeat TTE  - Continue levophed and dobutamine 2.5mg, wean as tolerated; plan to dc tomorow AM  - Goal MAP >65    #HTN  Per chart review, home lisinopril 40mg and HCTZ 25mg but has not taken for past 2 months    #HLD  Per chart review, on a statin (unknown which one) but not taking past 2 months.    PULM  #acute hypoxic respiratory failure 2/2 hypersensitivity pneumonitis vs eosinophilic pneumonia,  - CT scan without contrast (no contrast due to elevated Cr)  - f/u anca and immunoglobulin labs, blood parasites, stool parasite, strongyloides antibodies  - hold antibiotics at this time      RENAL  #DIANDRA  #Hess exchanged 9/14      GI  #place NG tube  - Consider starting feeds  - PPI       ENDO  #DM  [place] Admission A1c 12%  Per chart review, Home metformin 1000mg BID but has not taken for past 2 months  - discontinue insulin gtt, no indication for DKA  -mISS  - FSG q6hrs      ID  HATTIE      HEME/ONC  #DVT prophylaxis   - heparin subq  - maintain active T&S  - transfuse if Hgb <7        SKIN:  #Lines:      PREVENTIVE   F: as needed  E: Replete if K<4, Mg<2  N: NPO, possibly start tube feeds  GI: PPI  DVT: heparin subq  DISPO: MICU       38-year-old male hypertension, DM2, HLD, hepatitis presents with acute hypoxic respiratory failure 2/2 hypersensitivity pneumonitis vs eosinophilic pneumonia, cardiogenic shock, and DIANDRA from [facility] for continued management.     NEURO  #intubated and sedated , on propofol and fentanyl.  - will wean sedation in AM and plan for extubation tomorrow AM    CV  #cardiogenic shock  Echo showed EF 25% with global left ventricular hypokinesis.   On levophed and dobutamine  9/14 POCUS with global hypokinesis  - Formal TTE  - Continue levophed and dobutamine 2.5mg, wean as tolerated; plan to dc tomorrow AM  - Goal MAP >65    #HTN  Per chart review, home lisinopril 40mg and HCTZ 25mg but has not taken for past 2 months  - holding home meds     #HLD  Per chart review, on a statin (unknown which one) but not taking past 2 months.  - holding home meds    PULM  #acute hypoxic respiratory failure 2/2 hypersensitivity pneumonitis vs eosinophilic pneumonia  Pt initially presented to [facility] on 9/10 for SOB, PERRY, cough x 2 weeks. Initially requiring NC > HFNC > BiPAP > intubated on   - CT scan without contrast (no contrast due to elevated Cr)  - f/u anca and immunoglobulin labs, blood parasites, stool parasite, strongyloides antibodies  - hold antibiotics at this time      RENAL  #DIANDRA  #Hess exchanged 9/14  - strict is/os    GI  #place NG tube  - Consider starting feeds  - PPI       ENDO  #DM  [place] Admission A1c 12%  Per chart review, Home metformin 1000mg BID but has not taken for past 2 months  - discontinue insulin gtt, no indication for DKA  -mISS  - FSG q4hrs      ID  HATTIE      HEME/ONC  #DVT prophylaxis   - heparin subq  - maintain active T&S  - transfuse if Hgb <7      SKIN:  #Lines:      PREVENTIVE   F: as needed  E: Replete if K<4, Mg<2  N: NPO, possibly start tube feeds  GI: PPI  DVT: heparin subq  DISPO: MICU       38-year-old male hypertension, DM2, HLD, hepatitis presents with acute hypoxic respiratory failure 2/2 hypersensitivity pneumonitis vs eosinophilic pneumonia, cardiogenic shock, and DIANDRA from [facility] for continued management.     NEURO  #intubated and sedated , on propofol and fentanyl.  - will wean sedation in AM and plan for extubation tomorrow AM    CV  #cardiogenic shock  While at [facility] patient was found  to have EF 25% on TTE  with global left ventricular hypokinesis. Initially placed on levophed and dobutamine, Transferred to [facility] for possible cath on Dobutamine   ddx include ischemic cardiomyopathy vs eosinophilic myocardiosis given high suspension for eosinophilic pneumonia vs alcohol induced cardiomyopathy given history of alcohol use.   POCUS on admission 9/14 showed moderate to severe LV dysfunction with global hypokinesis    - Obtian formal TTE  - Continue dobutamine 2.5mg, wean as tolerated  - Goal MAP >65    #HTN  Per chart review, home lisinopril 40mg and HCTZ 25mg but has not taken for past 2 months  - holding home meds iso cardiogenic shock      PULM  #acute hypoxic respiratory failure   2/2 hypersensitivity pneumonitis vs eosinophilic pneumonia  Patient  initially presented to [facility] on 9/10 for SOB, PERRY, cough x 2 weeks. Found to have increasing O2 requirements initially placed no NC > HFNC > BiPAP, eventually requiring intubation, was proned and paralyzed per ARDS protocol  CT- chest significant for Multi- focal pneumonia. Of  note patient was treated with steroids, noted to improve rapidly after treatment, which highly concerning for hypersensitivity pneumonitis vs eosinophilic pneumonia  Patient was treated with CTX 9/10-9/14, Azithro 9/10-9/12, Bactrim 9/11-9/12, iv solumedrol 9/11-9/14 at OSH.   s/p Bronchoscopy 9/13.   repeat CT- chest with improved bilateral infiltrates  Now oxygenating well and not meeting ARDS criteria.   concerned for cryptogenic pna.     - f/u Aldolase, ANCA, RAMAKRISHNA, aspergillus, blood parasite, cocci abs, anti-ccp, FENG, dsDNA, fungitell, histoplasmosis antigen, hypersensitivity  pneumonitis panel, IgE , Anti geoffrey-1, myomarker , ,RF, scleroderma antibodies, sjogren' s antibodies,, stool parasite, strongyloides antibodies  - holding abx at this time.  -c/w  methylprednisolone 50iv bid       RENAL  #DIANDRA  #Hess exchanged 9/14  At OSH noted to have increase in BUN and Cr, concern for anuria however, since admission to [facility] patient has been making urine appropriately.  Suspect prerenal iso cardiogenic shock, plus minus septic shock   - f/u urine lytes  - strict is/os    GI  Plan to extubate in am and start po diet  - c/w PPI       ENDO  #DM  Poorly controlled   A1c 12% during [place] Admission   Per chart review, Home metformin 1000mg BID but has not taken for past 2 months  - discontinue insulin gtt, no indication for DKA  -mISS  - FSG q4hrs    #HLD  Per chart review, on a statin (unknown which one) but not taking past 2 months.  - holding home meds    ID  HATTIE    HEME/ONC  #DVT prophylaxis   - heparin subq  - maintain active T&S  - transfuse if Hgb <7      SKIN:  #Lines:       PREVENTIVE   F: as needed  E: Replete if K<4, Mg<2  N: NPO, possibly start tube feeds  GI: PPI  DVT: heparin subq  DISPO: MICU       38-year-old male hypertension, DM2, HLD, hepatitis presents with acute hypoxic respiratory failure 2/2 hypersensitivity pneumonitis vs eosinophilic pneumonia, cardiogenic shock, and DIANDRA from [facility] for continued management.     NEURO  #intubated and sedated , on propofol and fentanyl.  - will wean sedation in AM and plan for extubation tomorrow AM    CV  #cardiogenic shock  While at [facility] patient was found  to have EF 25% on TTE  with global left ventricular hypokinesis. Initially placed on levophed and dobutamine, Transferred to [facility] for possible cath on Dobutamine   ddx include ischemic cardiomyopathy vs eosinophilic myocardiosis given high suspension for eosinophilic pneumonia vs alcohol induced cardiomyopathy given history of alcohol use.   POCUS on admission 9/14 showed moderate to severe LV dysfunction with global hypokinesis    - Obtian formal TTE  - Continue dobutamine 2.5mg, wean as tolerated  - Goal MAP >65    #HTN  Per chart review, home lisinopril 40mg and HCTZ 25mg but has not taken for past 2 months  - holding home meds iso cardiogenic shock      PULM  #acute hypoxic respiratory failure   2/2 hypersensitivity pneumonitis vs eosinophilic pneumonia  Patient  initially presented to [facility] on 9/10 for SOB, PERRY, cough x 2 weeks. Found to have increasing O2 requirements initially placed on NC > HFNC > BiPAP, eventually requiring intubation, was proned 9/12-9/13 and paralyzed per ARDS protocol  CT- chest significant for Multi- focal pneumonia. Of  note patient was treated with steroids, was noted to improve rapidly after treatment, which was highly concerning for hypersensitivity pneumonitis vs eosinophilic pneumonia  Patient was treated with CTX 9/10-9/14, Azithro 9/10-9/12, Bactrim 9/11-9/12, iv solumedrol 9/11-9/14 at OSH.   s/p Bronchoscopy 9/13.   Repeat CT- chest with improved bilateral infiltrates  Now oxygenating well and not meeting ARDS criteria.     - f/u Aldolase, ANCA, RAMAKRISHNA, aspergillus, blood parasite, coccoidiodes antibodies, Anti-ccp, FENG, dsDNA, fungitell, histoplasma antigen, hypersensitivity  pneumonitis panel, IgE , Anti geoffrey-1, Myomarker panel ,RF, scleroderma antibodies, sjogren' s antibodies, stool parasite, strongyloides antibodies, toxocara antibodies. RVP.  - holding abx at this time.  -c/w  methylprednisolone 50iv bid       RENAL  #DIANDRA  #Hess exchanged 9/14  At OSH noted to have increase in BUN and Cr, concern for anuria however, since admission to [facility] patient has been making urine appropriately.  Suspect prerenal iso cardiogenic shock, plus minus septic shock   - f/u urine lytes  - strict is/os    GI  Plan to extubate in am and start po diet  - c/w PPI       ENDO  #DM  Poorly controlled   A1c 12% during [place] Admission   Per chart review, Home metformin 1000mg BID but has not taken for past 2 months  - discontinue insulin gtt, no indication for DKA  -mISS  - FSG q4hrs    #HLD  Per chart review, on a statin (unknown which one) but not taking past 2 months.  - holding home meds    ID  HATTIE    HEME/ONC  #DVT prophylaxis   - heparin subq  - maintain active T&S  - transfuse if Hgb <7      SKIN:  #Lines:       PREVENTIVE   F: as needed  E: Replete if K<4, Mg<2  N: NPO, possibly start tube feeds  GI: PPI  DVT: heparin subq  DISPO: MICU       38-year-old male hypertension, DM2, HLD, hepatitis presents with acute hypoxic respiratory failure 2/2 hypersensitivity pneumonitis vs eosinophilic pneumonia, cardiogenic shock, and DIANDRA from [facility] for continued management.     NEURO  #intubated and sedated , on propofol and fentanyl.  - will wean sedation in AM and plan for extubation tomorrow AM    CV  #cardiogenic shock  While at [facility] patient was found  to have EF 25% on TTE  with global left ventricular hypokinesis. Initially placed on levophed and dobutamine, Transferred to [facility] for possible cath on Dobutamine   ddx include ischemic cardiomyopathy vs eosinophilic myocardiosis given high suspension for eosinophilic pneumonia vs alcohol induced cardiomyopathy given history of alcohol use.   POCUS on admission 9/14 showed moderate to severe LV dysfunction with global hypokinesis    - Obtian formal TTE  - Continue dobutamine 2.5mg, wean as tolerated  - Goal MAP >65    #HTN  Per chart review, home lisinopril 40mg and HCTZ 25mg but has not taken for past 2 months  - holding home meds iso cardiogenic shock      PULM  #acute hypoxic respiratory failure   2/2 hypersensitivity pneumonitis vs eosinophilic pneumonia  Patient  initially presented to [facility] on 9/10 for SOB, PERRY, cough x 2 weeks. Found to have increasing O2 requirements initially placed on NC > HFNC > BiPAP, eventually requiring intubation, was proned 9/12-9/13 and paralyzed per ARDS protocol  CT- chest significant for Multi- focal pneumonia. Of  note patient was treated with steroids, was noted to improve rapidly after treatment, which was highly concerning for hypersensitivity pneumonitis vs eosinophilic pneumonia  Patient was treated with CTX 9/10-9/14, Azithro 9/10-9/12, Bactrim 9/11-9/12, iv solumedrol 9/11-9/14 at OSH.   s/p Bronchoscopy 9/13.   Repeat CT- chest with improved bilateral infiltrates  Now oxygenating well and not meeting ARDS criteria.     - f/u Aldolase, ANCA, RAMAKRISHNA, aspergillus, blood parasite, coccidioides antibodies, Anti-ccp, FENG, dsDNA, fungitell, histoplasma antigen, hypersensitivity  pneumonitis panel, IgE , Anti geoffrey-1, Myomarker panel ,RF, scleroderma antibodies, sjogren' s antibodies, stool parasite, strongyloides antibodies, toxocara antibodies. RVP.  - holding abx at this time.  -c/w  methylprednisolone 50mg IV BID      RENAL  #DIANDRA  #Hess exchanged 9/14  At OSH noted to have increase in BUN and Cr with concern for anuria however, since admission to [facility] patient has been making urine appropriately.  Suspect prerenal iso cardiogenic shock, plus minus septic shock   - f/u urine lytes  - strict is/os    GI  Plan to extubate in am and start po diet  - c/w PPI       ENDO  #DM  Poorly controlled   A1c 12% during [place] Admission   Per chart review, Home metformin 1000mg BID but has not taken for past 2 months  - discontinue insulin gtt, no indication for DKA  -mISS  - FSG q4hrs    #HLD  Per chart review, on a statin (unknown which one) but not taking past 2 months.  - holding home meds    ID  HATTIE    HEME/ONC  #DVT prophylaxis   - heparin subq  - maintain active T&S  - transfuse if Hgb <7      SKIN:  #Lines:       PREVENTIVE   F: as needed  E: Replete if K<4, Mg<2  N: NPO, possibly start tube feeds  GI: PPI  DVT: heparin subq  DISPO: MICU       38-year-old male hypertension, DM2, HLD, hepatitis presents with acute hypoxic respiratory failure 2/2 hypersensitivity pneumonitis vs eosinophilic pneumonia, cardiogenic shock, and DIANDRA from [facility] for continued management.     NEURO  #intubated and sedated , on propofol and fentanyl.  - will wean sedation in AM and plan for extubation tomorrow AM    CV  #cardiogenic shock  While at [facility] patient was found  to have EF 25% on TTE  with global left ventricular hypokinesis. Initially placed on levophed and dobutamine, Transferred to [facility] for possible cath on Dobutamine   ddx include ischemic cardiomyopathy vs eosinophilic myocardiosis given high suspension for eosinophilic pneumonia vs alcohol induced cardiomyopathy given history of alcohol use.   POCUS on admission 9/14 showed moderate to severe LV dysfunction with global hypokinesis    - Obtian formal TTE  - Continue dobutamine 2.5mg, wean as tolerated  - Goal MAP >65    #HTN  Per chart review, home lisinopril 40mg and HCTZ 25mg but has not taken for past 2 months  - holding home meds iso cardiogenic shock      PULM  #acute hypoxic respiratory failure   2/2 hypersensitivity pneumonitis vs eosinophilic pneumonia  Patient  initially presented to [facility] on 9/10 for SOB, PERRY, cough x 2 weeks. Found to have increasing O2 requirements initially placed on NC > HFNC > BiPAP, eventually requiring intubation, was proned 9/12-9/13 and paralyzed per ARDS protocol  CT- chest significant for Multi- focal pneumonia. Of  note patient was treated with steroids, was noted to improve rapidly after treatment, which was highly concerning for hypersensitivity pneumonitis vs eosinophilic pneumonia  Patient was treated with CTX 9/10-9/14, Azithro 9/10-9/12, Bactrim 9/11-9/12, iv solumedrol 9/11-9/14 at OSH.   s/p Bronchoscopy 9/13.   Repeat CT- chest with improved bilateral infiltrates  Now oxygenating well and not meeting ARDS criteria.     - f/u Aldolase, ANCA, RAMAKRISHNA, aspergillus, blood parasite, coccidioides antibodies, Anti-ccp, FENG, dsDNA, fungitell, histoplasma antigen, hypersensitivity  pneumonitis panel, IgE , Anti geoffrey-1, Myomarker panel ,RF, scleroderma antibodies, sjogren' s antibodies, stool parasite, strongyloides antibodies, toxocara antibodies. RVP.  - holding abx at this time.  -c/w  methylprednisolone 50mg IV BID      RENAL  #DIANDRA  #Hess exchanged 9/14  At OSH noted to have increase in BUN and Cr with concern for anuria however, since admission to [facility] patient has been making urine appropriately.  Suspect prerenal in setting of cardiogenic shock with possibly septic shock   - f/u urine lytes  - strict is/os    GI  Plan to extubate in am and start po diet  - c/w PPI       ENDO  #DM  Poorly controlled   A1c 12% during [place] Admission   Per chart review, Home metformin 1000mg BID but has not taken for past 2 months  - discontinue insulin gtt, no indication for DKA  -mISS  - FSG q4hrs    #HLD  Per chart review, on a statin (unknown which one) but not taking past 2 months.  - holding home meds    ID  HATTIE    HEME/ONC  #DVT prophylaxis   - heparin subq  - maintain active T&S  - transfuse if Hgb <7      SKIN:  #Lines:       PREVENTIVE   F: as needed  E: Replete if K<4, Mg<2  N: NPO, possibly start tube feeds  GI: PPI  DVT: heparin subq  DISPO: MICU       38-year-old male hypertension, DM2, HLD, hepatitis presents with acute hypoxic respiratory failure 2/2 hypersensitivity pneumonitis vs eosinophilic pneumonia, cardiogenic shock, and DIANDRA from [facility] for continued management.     NEURO  #intubated and sedated , on propofol and fentanyl.  - will wean sedation in AM and plan for extubation tomorrow AM    CV  #cardiogenic shock  While at [facility] patient was found  to have EF 25% on TTE  with global left ventricular hypokinesis. Initially placed on levophed and dobutamine, Transferred to [facility] for possible cath on Dobutamine   ddx include ischemic cardiomyopathy vs eosinophilic myocardiosis (given high suspension for eosinophilic pneumonia) vs alcohol induced cardiomyopathy (given history of alcohol use).   POCUS on admission 9/14 showed moderate to severe LV dysfunction with global hypokinesis    - Obtian formal TTE  - Continue dobutamine 2.5mg, wean as tolerated  - Goal MAP >65    #HTN  Per chart review, home lisinopril 40mg and HCTZ 25mg but has not taken for past 2 months  - holding home meds iso cardiogenic shock      PULM  #acute hypoxic respiratory failure  Possibly 2/2 hypersensitivity pneumonitis vs eosinophilic pneumonia  Patient  initially presented to [facility] on 9/10 for SOB, PERRY, cough x 2 weeks. Found to have increasing O2 requirements initially placed on NC > HFNC > BiPAP, eventually requiring intubation, was proned 9/12-9/13 and paralyzed per ARDS protocol  CT- chest significant for Multi- focal pneumonia.   Of  note patient was treated with steroids, was noted to improve rapidly after treatment, which was highly concerning for hypersensitivity pneumonitis vs eosinophilic pneumonia  Patient was treated with CTX 9/10-9/14, Azithro 9/10-9/12, Bactrim 9/11-9/12, iv solumedrol 9/11-9/14 at OSH.   s/p Bronchoscopy 9/13.   Repeat CT- chest with improved bilateral infiltrates  Now oxygenating well and not meeting ARDS criteria.     - f/u Aldolase, ANCA, RAMAKRISHNA, aspergillus, blood parasite, coccidioides antibodies, Anti-ccp, FENG, dsDNA, fungitell, histoplasma antigen, hypersensitivity  pneumonitis panel, IgE , Anti geoffrey-1, Myomarker panel ,RF, scleroderma antibodies, sjogren' s antibodies, stool parasite, strongyloides antibodies, toxocara antibodies. RVP.  - holding abx at this time.  -c/w  methylprednisolone 50mg IV BID      RENAL  #DIANDRA  Hess exchanged 9/14  At OSH noted to have increase in BUN and Cr with concern for anuria however, since admission to [facility] patient has been making urine appropriately.  Suspect prerenal in setting of cardiogenic shock with possibly septic shock   - f/u urine lytes  - strict is/os    GI  Plan to extubate in am and start po diet  - c/w PPI       ENDO  #DM  Poorly controlled   A1c 12% during [place] Admission   Per chart review, Home metformin 1000mg BID but has not taken for past 2 months  - discontinue insulin gtt, no indication for DKA  -mISS  - FSG q4hrs    #HLD  Per chart review, on a statin (unknown which one) but not taking past 2 months.  - holding home meds    ID  HATTIE    HEME/ONC  #DVT prophylaxis   - heparin subq  - maintain active T&S  - transfuse if Hgb <7      SKIN:  #Lines:       PREVENTIVE   F: as needed  E: Replete if K<4, Mg<2  N: NPO  GI: PPI  DVT: heparin subq  DISPO: MICU

## 2024-09-15 LAB
A1C WITH ESTIMATED AVERAGE GLUCOSE RESULT: 11.6 % — HIGH (ref 4–5.6)
ALBUMIN SERPL ELPH-MCNC: 2.9 G/DL — LOW (ref 3.3–5)
ALP SERPL-CCNC: 72 U/L — SIGNIFICANT CHANGE UP (ref 40–120)
ALT FLD-CCNC: 19 U/L — SIGNIFICANT CHANGE UP (ref 10–45)
ANION GAP SERPL CALC-SCNC: 10 MMOL/L — SIGNIFICANT CHANGE UP (ref 5–17)
ANION GAP SERPL CALC-SCNC: 13 MMOL/L — SIGNIFICANT CHANGE UP (ref 5–17)
ANTI-RIBONUCLEAR PROTEIN: 0.2 AI — SIGNIFICANT CHANGE UP
AST SERPL-CCNC: 13 U/L — SIGNIFICANT CHANGE UP (ref 10–40)
BASE EXCESS BLDA CALC-SCNC: -6.4 MMOL/L — LOW (ref -2–3)
BASOPHILS # BLD AUTO: 0 K/UL — SIGNIFICANT CHANGE UP (ref 0–0.2)
BASOPHILS NFR BLD AUTO: 0 % — SIGNIFICANT CHANGE UP (ref 0–2)
BILIRUB SERPL-MCNC: <0.2 MG/DL — SIGNIFICANT CHANGE UP (ref 0.2–1.2)
BUN SERPL-MCNC: 75 MG/DL — HIGH (ref 7–23)
BUN SERPL-MCNC: 81 MG/DL — HIGH (ref 7–23)
CALCIUM SERPL-MCNC: 8.2 MG/DL — LOW (ref 8.4–10.5)
CALCIUM SERPL-MCNC: 8.4 MG/DL — SIGNIFICANT CHANGE UP (ref 8.4–10.5)
CCP IGG SERPL-ACNC: <8 U/ML — SIGNIFICANT CHANGE UP
CHLORIDE SERPL-SCNC: 104 MMOL/L — SIGNIFICANT CHANGE UP (ref 96–108)
CHLORIDE SERPL-SCNC: 106 MMOL/L — SIGNIFICANT CHANGE UP (ref 96–108)
CO2 BLDA-SCNC: 22 MMOL/L — SIGNIFICANT CHANGE UP (ref 19–24)
CO2 SERPL-SCNC: 20 MMOL/L — LOW (ref 22–31)
CO2 SERPL-SCNC: 20 MMOL/L — LOW (ref 22–31)
CREAT ?TM UR-MCNC: 110 MG/DL — SIGNIFICANT CHANGE UP
CREAT SERPL-MCNC: 3.49 MG/DL — HIGH (ref 0.5–1.3)
CREAT SERPL-MCNC: 3.97 MG/DL — HIGH (ref 0.5–1.3)
DSDNA AB FLD-ACNC: <0.2 AI — SIGNIFICANT CHANGE UP
DSDNA AB SER-ACNC: <1 IU/ML — SIGNIFICANT CHANGE UP
EGFR: 19 ML/MIN/1.73M2 — LOW
EGFR: 22 ML/MIN/1.73M2 — LOW
ENA JO1 AB SER-ACNC: <0.2 AI — SIGNIFICANT CHANGE UP
ENA SCL70 AB SER-ACNC: <0.2 AI — SIGNIFICANT CHANGE UP
ENA SM AB FLD QL: <0.2 AI — SIGNIFICANT CHANGE UP
ENA SS-A AB FLD IA-ACNC: <0.2 AI — SIGNIFICANT CHANGE UP
EOSINOPHIL # BLD AUTO: 0 K/UL — SIGNIFICANT CHANGE UP (ref 0–0.5)
EOSINOPHIL NFR BLD AUTO: 0 % — SIGNIFICANT CHANGE UP (ref 0–6)
ESTIMATED AVERAGE GLUCOSE: 286 MG/DL — HIGH (ref 68–114)
FOLATE SERPL-MCNC: 5.6 NG/ML — SIGNIFICANT CHANGE UP
GAS PNL BLDA: SIGNIFICANT CHANGE UP
GLUCOSE BLDC GLUCOMTR-MCNC: 248 MG/DL — HIGH (ref 70–99)
GLUCOSE BLDC GLUCOMTR-MCNC: 249 MG/DL — HIGH (ref 70–99)
GLUCOSE BLDC GLUCOMTR-MCNC: 256 MG/DL — HIGH (ref 70–99)
GLUCOSE BLDC GLUCOMTR-MCNC: 268 MG/DL — HIGH (ref 70–99)
GLUCOSE BLDC GLUCOMTR-MCNC: 283 MG/DL — HIGH (ref 70–99)
GLUCOSE SERPL-MCNC: 274 MG/DL — HIGH (ref 70–99)
GLUCOSE SERPL-MCNC: 291 MG/DL — HIGH (ref 70–99)
HCO3 BLDA-SCNC: 20 MMOL/L — LOW (ref 21–28)
HCT VFR BLD CALC: 34.9 % — LOW (ref 39–50)
HGB BLD-MCNC: 11.3 G/DL — LOW (ref 13–17)
IMM GRANULOCYTES NFR BLD AUTO: 0.7 % — SIGNIFICANT CHANGE UP (ref 0–0.9)
LYMPHOCYTES # BLD AUTO: 0.62 K/UL — LOW (ref 1–3.3)
LYMPHOCYTES # BLD AUTO: 7.3 % — LOW (ref 13–44)
MAGNESIUM SERPL-MCNC: 2.7 MG/DL — HIGH (ref 1.6–2.6)
MAGNESIUM SERPL-MCNC: 2.7 MG/DL — HIGH (ref 1.6–2.6)
MCHC RBC-ENTMCNC: 27.5 PG — SIGNIFICANT CHANGE UP (ref 27–34)
MCHC RBC-ENTMCNC: 32.4 GM/DL — SIGNIFICANT CHANGE UP (ref 32–36)
MCV RBC AUTO: 84.9 FL — SIGNIFICANT CHANGE UP (ref 80–100)
MONOCYTES # BLD AUTO: 0.24 K/UL — SIGNIFICANT CHANGE UP (ref 0–0.9)
MONOCYTES NFR BLD AUTO: 2.8 % — SIGNIFICANT CHANGE UP (ref 2–14)
NEUTROPHILS # BLD AUTO: 7.6 K/UL — HIGH (ref 1.8–7.4)
NEUTROPHILS NFR BLD AUTO: 89.2 % — HIGH (ref 43–77)
NRBC # BLD: 0 /100 WBCS — SIGNIFICANT CHANGE UP (ref 0–0)
OSMOLALITY UR: 396 MOSM/KG — SIGNIFICANT CHANGE UP (ref 50–1200)
PCO2 BLDA: 43 MMHG — SIGNIFICANT CHANGE UP (ref 35–48)
PH BLDA: 7.28 — LOW (ref 7.35–7.45)
PHOSPHATE SERPL-MCNC: 7.8 MG/DL — HIGH (ref 2.5–4.5)
PHOSPHATE SERPL-MCNC: 8.6 MG/DL — HIGH (ref 2.5–4.5)
PLATELET # BLD AUTO: 356 K/UL — SIGNIFICANT CHANGE UP (ref 150–400)
PO2 BLDA: 104 MMHG — SIGNIFICANT CHANGE UP (ref 83–108)
POTASSIUM SERPL-MCNC: 4.6 MMOL/L — SIGNIFICANT CHANGE UP (ref 3.5–5.3)
POTASSIUM SERPL-MCNC: 4.8 MMOL/L — SIGNIFICANT CHANGE UP (ref 3.5–5.3)
POTASSIUM SERPL-SCNC: 4.6 MMOL/L — SIGNIFICANT CHANGE UP (ref 3.5–5.3)
POTASSIUM SERPL-SCNC: 4.8 MMOL/L — SIGNIFICANT CHANGE UP (ref 3.5–5.3)
PROT SERPL-MCNC: 5.9 G/DL — LOW (ref 6–8.3)
RAPID RVP RESULT: SIGNIFICANT CHANGE UP
RBC # BLD: 4.11 M/UL — LOW (ref 4.2–5.8)
RBC # FLD: 12.1 % — SIGNIFICANT CHANGE UP (ref 10.3–14.5)
RF+CCP IGG SER-IMP: NEGATIVE — SIGNIFICANT CHANGE UP
RHEUMATOID FACT SERPL-ACNC: 11 IU/ML — SIGNIFICANT CHANGE UP (ref 0–13)
SAO2 % BLDA: 99.1 % — HIGH (ref 94–98)
SARS-COV-2 RNA SPEC QL NAA+PROBE: SIGNIFICANT CHANGE UP
SODIUM SERPL-SCNC: 134 MMOL/L — LOW (ref 135–145)
SODIUM SERPL-SCNC: 139 MMOL/L — SIGNIFICANT CHANGE UP (ref 135–145)
SODIUM UR-SCNC: < 20 MMOL/L — SIGNIFICANT CHANGE UP
UUN UR-MCNC: 647 MG/DL — SIGNIFICANT CHANGE UP
VIT B12 SERPL-MCNC: 495 PG/ML — SIGNIFICANT CHANGE UP (ref 232–1245)
WBC # BLD: 8.52 K/UL — SIGNIFICANT CHANGE UP (ref 3.8–10.5)
WBC # FLD AUTO: 8.52 K/UL — SIGNIFICANT CHANGE UP (ref 3.8–10.5)

## 2024-09-15 PROCEDURE — 99291 CRITICAL CARE FIRST HOUR: CPT | Mod: GC

## 2024-09-15 PROCEDURE — 99253 IP/OBS CNSLTJ NEW/EST LOW 45: CPT

## 2024-09-15 RX ORDER — HUMAN INSULIN 100 [IU]/ML
10 INJECTION, SUSPENSION SUBCUTANEOUS ONCE
Refills: 0 | Status: COMPLETED | OUTPATIENT
Start: 2024-09-15 | End: 2024-09-15

## 2024-09-15 RX ORDER — INSULIN LISPRO 100/ML
VIAL (ML) SUBCUTANEOUS AT BEDTIME
Refills: 0 | Status: DISCONTINUED | OUTPATIENT
Start: 2024-09-15 | End: 2024-09-23

## 2024-09-15 RX ORDER — ACETAMINOPHEN 325 MG
1000 TABLET ORAL ONCE
Refills: 0 | Status: COMPLETED | OUTPATIENT
Start: 2024-09-15 | End: 2024-09-15

## 2024-09-15 RX ORDER — METHYLPREDNISOLONE ACETATE 80 MG/ML
50 INJECTION, SUSPENSION INTRA-ARTICULAR; INTRALESIONAL; INTRAMUSCULAR; SOFT TISSUE EVERY 12 HOURS
Refills: 0 | Status: DISCONTINUED | OUTPATIENT
Start: 2024-09-15 | End: 2024-09-18

## 2024-09-15 RX ORDER — PIPERACILLIN SODIUM AND TAZOBACTAM SODIUM 12; 1.5 G/60ML; G/60ML
4.5 INJECTION, POWDER, LYOPHILIZED, FOR SOLUTION INTRAVENOUS ONCE
Refills: 0 | Status: COMPLETED | OUTPATIENT
Start: 2024-09-15 | End: 2024-09-15

## 2024-09-15 RX ORDER — PIPERACILLIN SODIUM AND TAZOBACTAM SODIUM 12; 1.5 G/60ML; G/60ML
4.5 INJECTION, POWDER, LYOPHILIZED, FOR SOLUTION INTRAVENOUS EVERY 12 HOURS
Refills: 0 | Status: COMPLETED | OUTPATIENT
Start: 2024-09-16 | End: 2024-09-18

## 2024-09-15 RX ORDER — INSULIN LISPRO 100/ML
7 VIAL (ML) SUBCUTANEOUS
Refills: 0 | Status: DISCONTINUED | OUTPATIENT
Start: 2024-09-15 | End: 2024-09-16

## 2024-09-15 RX ORDER — INSULIN LISPRO 100/ML
VIAL (ML) SUBCUTANEOUS
Refills: 0 | Status: DISCONTINUED | OUTPATIENT
Start: 2024-09-15 | End: 2024-09-23

## 2024-09-15 RX ORDER — FUROSEMIDE 10 MG/ML
40 INJECTION INTRAVENOUS ONCE
Refills: 0 | Status: COMPLETED | OUTPATIENT
Start: 2024-09-15 | End: 2024-09-15

## 2024-09-15 RX ORDER — INSULIN GLARGINE 300 U/ML
20 INJECTION, SOLUTION SUBCUTANEOUS AT BEDTIME
Refills: 0 | Status: DISCONTINUED | OUTPATIENT
Start: 2024-09-15 | End: 2024-09-16

## 2024-09-15 RX ORDER — MAGNESIUM SULFATE 500 MG/ML
1 VIAL (ML) INJECTION ONCE
Refills: 0 | Status: DISCONTINUED | OUTPATIENT
Start: 2024-09-15 | End: 2024-09-15

## 2024-09-15 RX ADMIN — Medication 7500 UNIT(S): at 05:38

## 2024-09-15 RX ADMIN — CHLORHEXIDINE GLUCONATE ORAL RINSE 15 MILLILITER(S): 1.2 SOLUTION DENTAL at 05:38

## 2024-09-15 RX ADMIN — PROPOFOL 32.9 MICROGRAM(S)/KG/MIN: 10 INJECTION, EMULSION INTRAVENOUS at 02:30

## 2024-09-15 RX ADMIN — METHYLPREDNISOLONE ACETATE 50 MILLIGRAM(S): 80 INJECTION, SUSPENSION INTRA-ARTICULAR; INTRALESIONAL; INTRAMUSCULAR; SOFT TISSUE at 02:43

## 2024-09-15 RX ADMIN — Medication 1 DROP(S): at 13:45

## 2024-09-15 RX ADMIN — PIPERACILLIN SODIUM AND TAZOBACTAM SODIUM 200 GRAM(S): 12; 1.5 INJECTION, POWDER, LYOPHILIZED, FOR SOLUTION INTRAVENOUS at 11:03

## 2024-09-15 RX ADMIN — FUROSEMIDE 40 MILLIGRAM(S): 10 INJECTION INTRAVENOUS at 06:36

## 2024-09-15 RX ADMIN — HUMAN INSULIN 10 UNIT(S): 100 INJECTION, SUSPENSION SUBCUTANEOUS at 12:22

## 2024-09-15 RX ADMIN — PANTOPRAZOLE SODIUM 40 MILLIGRAM(S): 40 TABLET, DELAYED RELEASE ORAL at 21:21

## 2024-09-15 RX ADMIN — Medication 1 DROP(S): at 01:31

## 2024-09-15 RX ADMIN — INSULIN GLARGINE 20 UNIT(S): 300 INJECTION, SOLUTION SUBCUTANEOUS at 21:39

## 2024-09-15 RX ADMIN — Medication 1000 MILLIGRAM(S): at 03:00

## 2024-09-15 RX ADMIN — Medication 6: at 17:46

## 2024-09-15 RX ADMIN — Medication 7500 UNIT(S): at 13:43

## 2024-09-15 RX ADMIN — Medication 1 DROP(S): at 17:48

## 2024-09-15 RX ADMIN — Medication 1 DROP(S): at 05:39

## 2024-09-15 RX ADMIN — Medication 400 MILLIGRAM(S): at 02:43

## 2024-09-15 RX ADMIN — CHLORHEXIDINE GLUCONATE ORAL RINSE 1 APPLICATION(S): 1.2 SOLUTION DENTAL at 05:39

## 2024-09-15 RX ADMIN — PROPOFOL 32.9 MICROGRAM(S)/KG/MIN: 10 INJECTION, EMULSION INTRAVENOUS at 06:34

## 2024-09-15 RX ADMIN — Medication 5.49 MICROGRAM(S)/KG/HR: at 06:44

## 2024-09-15 RX ADMIN — Medication 4: at 02:28

## 2024-09-15 RX ADMIN — PIPERACILLIN SODIUM AND TAZOBACTAM SODIUM 25 GRAM(S): 12; 1.5 INJECTION, POWDER, LYOPHILIZED, FOR SOLUTION INTRAVENOUS at 17:39

## 2024-09-15 RX ADMIN — METHYLPREDNISOLONE ACETATE 50 MILLIGRAM(S): 80 INJECTION, SUSPENSION INTRA-ARTICULAR; INTRALESIONAL; INTRAMUSCULAR; SOFT TISSUE at 17:39

## 2024-09-15 RX ADMIN — Medication 6: at 06:35

## 2024-09-15 RX ADMIN — Medication 6: at 11:04

## 2024-09-15 RX ADMIN — Medication 7500 UNIT(S): at 21:21

## 2024-09-15 NOTE — CONSULT NOTE ADULT - SUBJECTIVE AND OBJECTIVE BOX
CHIEF COMPLAINT:    HPI:  HPI: Information obtained via chart review. Patient arrived intubated/sedated.    38yoM with a PMHx of HTN, DM2 (uncontrolled, A1c 12), HLD, hepatitis initially presented to [facility] 9/10 ER for cough for 2 weeks. Reported cough productive of yellow sputum, runny nose, subjective fevers as well and PERRY. [facility], tested him for COVID (negative), and sent him to the ER. Patient bought a home pulse oximeter that ranged from 89 to 93% this week. In the ED, the patient was found to have a multifocal pneumonia and started on treatment for CAP. He was initially on 3LNC on 9/10 but on  O2 req increased > HFNC. CT Chest  extensive multifocal consolidations > transferred to ICU > BiPAP > intubated . Treated with ARDS protocol, paralyzed, proned -. Pulm consulted, ddx severe CAP vs  vs PJP. Treated with CTX 9/10-, azithro 9/10-, bactrim -, iv solumedrol -. Bronch , pending results. HIV neg. Reintubated  for ??. Also noted to have decreased UOP and ?anuric. Course further c/b newly reduced EF 25% and cardiogenic shock req dobutamine.  PCP: Bi [phone]    Upon admission to [facility] MICU:   Vitals: T__, , /75, RR 22, 96% on vent FiO2 50//RR 18/PEEP 5  Labs: WBC 13.28, Hb 12.7, Na 132, BUN 59, Cr 3.57, Procal 4.4  AB.24/44/19 (14 Sep 2024 19:09)    Consultant HPI: 38M w/ hx of HTN, DM, HLD, hepatitis p/w AHRF at [place], there EF 25% on TTE  with global left ventricular hypokinesis. Initially placed on levophed and dobutamine,  transferred to [facility].    PAST MEDICAL & SURGICAL HISTORY:  Diabetes      HTN (hypertension)      HLD (hyperlipidemia)      Alcohol use        [ x] Diabetes   [x ] Hypertension  [x ] Hyperlipidemia  [ ] CAD  [ ] PCI  [ ] CABG    PREVIOUS DIAGNOSTIC TESTING:    [ ] Echocardiogram:  [ ] Stress Test:  [ ] Catheterization: 	    FAMILY HISTORY:  FH: type 2 diabetes      SOCIAL HISTORY:    [ ] Non-smoker  [ ] Current Smoker  [ ] Former Smoker  [ ] Alcohol Use  [ ] Drug Use    ALLERGIES/INTOLERANCES:  Allergy Status Unknown    HOME MEDICATIONS:    INPATIENT MEDICATIONS:    heparin   Injectable 7500 Unit(s) SubCutaneous every 8 hours    artificial  tears Solution 1 Drop(s) Both EYES four times a day  chlorhexidine 2% Cloths 1 Application(s) Topical <User Schedule>  chlorhexidine 2% Cloths 1 Application(s) Topical <User Schedule>  dextrose 5%. 1000 milliLiter(s) IV Continuous <Continuous>  dextrose 5%. 1000 milliLiter(s) IV Continuous <Continuous>  dextrose 50% Injectable 12.5 Gram(s) IV Push once  dextrose 50% Injectable 25 Gram(s) IV Push once  dextrose 50% Injectable 25 Gram(s) IV Push once  dextrose Oral Gel 15 Gram(s) Oral once PRN  glucagon  Injectable 1 milliGRAM(s) IntraMuscular once  insulin glargine Injectable (LANTUS) 20 Unit(s) SubCutaneous at bedtime  insulin lispro (ADMELOG) corrective regimen sliding scale   SubCutaneous three times a day before meals  insulin lispro (ADMELOG) corrective regimen sliding scale   SubCutaneous at bedtime  methylPREDNISolone sodium succinate Injectable 50 milliGRAM(s) IV Push every 12 hours  pantoprazole  Injectable 40 milliGRAM(s) IV Push every 24 hours  piperacillin/tazobactam IVPB.- 4.5 Gram(s) IV Intermittent once  sodium chloride 0.9% lock flush 10 milliLiter(s) IV Push every 1 hour PRN      REVIEW OF SYSTEMS:    CONSTITUTIONAL: No weakness, F/C, wt loss/gain  EYES: No visual changes/disturbances  ENMT: No dry mouth, no vertigo  NECK: No pain or stiffness  RESPIRATORY: No cough, wheezing, hemoptysis; No shortness of breath  CARDIOVASCULAR: No chest pain, palpitations, lightheadedness/dizziness, LOC, or leg swelling  GASTROINTESTINAL: No abdominal or epigastric pain. No N/V/D/C. No melena, hematochezia, or hematemesis.  GENITOURINARY: No dysuria, increased frequency, hematuria, or incontinence  NEUROLOGICAL: No lightheadedness/dizziness, LOC, headaches, numbness or weakness  MUSCULOSKELETAL: No joint pain or swelling; No muscle, back, or extremity pain  SKIN: No itching, burning, rashes, or lesions   ENDOCRINE: No heat or cold intolerance; No hair loss  HEME/LYMPH: No easy bruising, or bleeding gums  PSYCHIATRIC: No depression, anxiety, mood swings, or difficulty sleeping    [ ] All other review of systems are negative unless indicated above.  [ ] Unable to obtain due to:    PHYSICAL EXAM:    T(C): 37.8 (09-15-24 @ 10:08), Max: 38 (09-15-24 @ 02:30)  HR: 93 (09-15-24 @ 13:00) (86 - 109)  BP: --  RR: 20 (09-15-24 @ 08:55) (18 - 22)  SpO2: 94% (09-15-24 @ 13:00) (92% - 97%)  Wt(kg): --    I&O's Summary    14 Sep 2024 07:01  -  15 Sep 2024 07:00  --------------------------------------------------------  IN: 849.9 mL / OUT: 815 mL / NET: 34.9 mL    15 Sep 2024 07:01  -  15 Sep 2024 13:06  --------------------------------------------------------  IN: 233.2 mL / OUT: 1260 mL / NET: -1026.8 mL    GENERAL: NAD, well-developed  HEAD:  NCAT  HEENT: EOMI, PERRL, conjunctiva and sclera clear; moist mucosa; Neck supple, No JVD  CARDIOVASCULAR: RRR, normal S1 S2, no M/R/G, no JVD, neg HJR, nondisplaced PMI, no LE edema  RESPIRATORY: Lungs clear to auscultation b/l, no C/W/R  GASTROINTESTINAL: +BS, soft, non-distended, non-tender, no HSM  VASCULAR: Peripheral pulses palpable 2+ bilaterally  EXTREMITIES: Warm. No clubbing, cyanosis or edema. Normal range of motion.  SKIN: No rashes, lesions, ecchymoses, or cyanosis  NEURO: AAOx3, no focal deficits  PSYCH: Nl behavior, nl affect  LINES:    TELEMETRY: 	      ECG:  	  	  LABS:                        11.3   8.52  )-----------( 356      ( 15 Sep 2024 05:21 )             34.9     -15    134<L>  |  104  |  75<H>  ----------------------------<  274<H>  4.8   |  20<L>  |  3.97<H>    Ca    8.4      15 Sep 2024 05:21  Phos  8.6     09-15  Mg     2.7     09-15    TPro  5.9<L>  /  Alb  2.9<L>  /  TBili  <0.2  /  DBili  x   /  AST  13  /  ALT  19  /  AlkPhos  72  09-15      Lipid Profile:   HgA1c:   TSH:     CARDIAC MARKERS:          proBNP:     RADIOLOGY:      ASSESSMENT/PLAN: 	     CHIEF COMPLAINT:    HPI:  HPI: Information obtained via chart review. Patient arrived intubated/sedated.    38yoM with a PMHx of HTN, DM2 (uncontrolled, A1c 12), HLD, hepatitis initially presented to [facility] 9/10 ER for cough for 2 weeks. Reported cough productive of yellow sputum, runny nose, subjective fevers as well and PERRY. [facility], tested him for COVID (negative), and sent him to the ER. Patient bought a home pulse oximeter that ranged from 89 to 93% this week. In the ED, the patient was found to have a multifocal pneumonia and started on treatment for CAP. He was initially on 3LNC on 9/10 but on  O2 req increased > HFNC. CT Chest  extensive multifocal consolidations > transferred to ICU > BiPAP > intubated . Treated with ARDS protocol, paralyzed, proned -. Pulm consulted, ddx severe CAP vs  vs PJP. Treated with CTX 9/10-, azithro 9/10-, bactrim -, iv solumedrol -. Bronch , pending results. HIV neg. Reintubated  for ??. Also noted to have decreased UOP and ?anuric. Course further c/b newly reduced EF 25% and cardiogenic shock req dobutamine.  PCP: iB [phone]    Upon admission to [facility] MICU:   Vitals: T__, , /75, RR 22, 96% on vent FiO2 50//RR 18/PEEP 5  Labs: WBC 13.28, Hb 12.7, Na 132, BUN 59, Cr 3.57, Procal 4.4  AB.24/44/19 (14 Sep 2024 19:09)    Consultant HPI: 38M w/ hx of HTN, DM, HLD, hepatitis p/w AHRF at [place], there EF 25% on TTE  with global left ventricular hypokinesis. Initially placed on levophed and dobutamine,  transferred to [facility]. Pt endorses a week of decreased exertional capacity prior to coming to hospital but denies any recent viral illnesses or sicknesses. Denies any etoh or substance use (however, mom was in the room). No family hx of CM or SCD or CAD. Denies any palpitations, orthopnea or LE swelling prior to his hospitalization      PAST MEDICAL & SURGICAL HISTORY:  Diabetes      HTN (hypertension)      HLD (hyperlipidemia)      Alcohol use        [ x] Diabetes   [x ] Hypertension  [x ] Hyperlipidemia  [ ] CAD  [ ] PCI  [ ] CABG        FAMILY HISTORY:  FH: type 2 diabetes      SOCIAL HISTORY:    [ ] Non-smoker  [ ] Current Smoker  [ ] Former Smoker  [ ] Alcohol Use  [ ] Drug Use    ALLERGIES/INTOLERANCES:  Allergy Status Unknown    HOME MEDICATIONS:    INPATIENT MEDICATIONS:    heparin   Injectable 7500 Unit(s) SubCutaneous every 8 hours    artificial  tears Solution 1 Drop(s) Both EYES four times a day  chlorhexidine 2% Cloths 1 Application(s) Topical <User Schedule>  chlorhexidine 2% Cloths 1 Application(s) Topical <User Schedule>  dextrose 5%. 1000 milliLiter(s) IV Continuous <Continuous>  dextrose 5%. 1000 milliLiter(s) IV Continuous <Continuous>  dextrose 50% Injectable 12.5 Gram(s) IV Push once  dextrose 50% Injectable 25 Gram(s) IV Push once  dextrose 50% Injectable 25 Gram(s) IV Push once  dextrose Oral Gel 15 Gram(s) Oral once PRN  glucagon  Injectable 1 milliGRAM(s) IntraMuscular once  insulin glargine Injectable (LANTUS) 20 Unit(s) SubCutaneous at bedtime  insulin lispro (ADMELOG) corrective regimen sliding scale   SubCutaneous three times a day before meals  insulin lispro (ADMELOG) corrective regimen sliding scale   SubCutaneous at bedtime  methylPREDNISolone sodium succinate Injectable 50 milliGRAM(s) IV Push every 12 hours  pantoprazole  Injectable 40 milliGRAM(s) IV Push every 24 hours  piperacillin/tazobactam IVPB.- 4.5 Gram(s) IV Intermittent once  sodium chloride 0.9% lock flush 10 milliLiter(s) IV Push every 1 hour PRN      REVIEW OF SYSTEMS:      [ x] All other review of systems are negative unless indicated above.  [ ] Unable to obtain due to:    PHYSICAL EXAM:    T(C): 37.8 (09-15-24 @ 10:08), Max: 38 (09-15-24 @ 02:30)  HR: 93 (09-15-24 @ 13:00) (86 - 109)  BP: --  RR: 20 (09-15-24 @ 08:55) (18 - 22)  SpO2: 94% (09-15-24 @ 13:00) (92% - 97%)  Wt(kg): --    I&O's Summary    14 Sep 2024 07:01  -  15 Sep 2024 07:00  --------------------------------------------------------  IN: 849.9 mL / OUT: 815 mL / NET: 34.9 mL    15 Sep 2024 07:01  -  15 Sep 2024 13:06  --------------------------------------------------------  IN: 233.2 mL / OUT: 1260 mL / NET: -1026.8 mL    GENERAL: NAD  HEAD:  NCAT  HEENT: Difficult to assess JVD given body habitus  CARDIOVASCULAR: RRR, normal S1 S2, no M/R/G  RESPIRATORY: Bibasilar crackles  VASCULAR: Peripheral pulses palpable 2+ bilaterally  EXTREMITIES: Warm. No edema    	  LABS:                        11.3   8.52  )-----------( 356      ( 15 Sep 2024 05:21 )             34.9     09-15    134<L>  |  104  |  75<H>  ----------------------------<  274<H>  4.8   |  20<L>  |  3.97<H>    Ca    8.4      15 Sep 2024 05:21  Phos  8.6     -15  Mg     2.7     -15    TPro  5.9<L>  /  Alb  2.9<L>  /  TBili  <0.2  /  DBili  x   /  AST  13  /  ALT  19  /  AlkPhos  72  -15      Lipid Profile:   HgA1c:   TSH:     CARDIAC MARKERS:          proBNP:     RADIOLOGY:      ASSESSMENT/PLAN:

## 2024-09-15 NOTE — PROGRESS NOTE ADULT - ATTENDING COMMENTS
Patient respiratory status continues to be improved and will plan to extubate him today. Urine output is also improving with signs of active clearance. Favor that he likely has ATN and he will continue to autodiuresis. Will advance diet as he is able to swallow. Favor that etiology of his symptoms are inflammatory in nature so will continue at 1mg/kg solu medrol for now.

## 2024-09-15 NOTE — PATIENT PROFILE ADULT - FALL HARM RISK - HARM RISK INTERVENTIONS

## 2024-09-15 NOTE — CONSULT NOTE ADULT - ASSESSMENT
38-year-old male hypertension, DM2, HLD, hepatitis presents with acute hypoxic respiratory failure c/b cardiogenic shock w/ new HFrEF    Review of Studies:  TTE 9/12/24: Left ventricular systolic function is severely decreased with an ejection fraction of 25 % by Valle's method of disks. Global left ventricular hypokinesis. The right ventricle is not well visualized, probably normal right ventricular systolic function. The cardiac valves are not well seen except for the aortic valve which appears normal. No pericardial effusion seen.      #HFrEF  Etiology: Unclear etiology at this time. Stress CM vs. myocarditis vs. ischemic vs. etoh given hx. Recommend CCTA for ischemic eval and repeat formal TTE on 9/16 to see if there has been interval improvement in EF.   GDMT: D/c'd  on 9/15, would hold GDMT for today. Can determine need for GDMT depending on repeat TTE on 9/16    Diuretics: Pt is urinating well off any diuretics. Resp status improving as doing well post-extubation. Significant B/L consolidative opacities on CT chest in upper and lower lobes. Would not recommend diuretics at this time given quality UOP.    38-year-old male hypertension, DM2, HLD, hepatitis presents with acute hypoxic respiratory failure c/b cardiogenic shock w/ new HFrEF    Review of Studies:  TTE 9/12/24: Left ventricular systolic function is severely decreased with an ejection fraction of 25 % by Valle's method of disks. Global left ventricular hypokinesis. The right ventricle is not well visualized, probably normal right ventricular systolic function. The cardiac valves are not well seen except for the aortic valve which appears normal. No pericardial effusion seen.  ECG: Sinus tachycardia with left axis deviation, NSST changes     #HFrEF  Etiology: Unclear etiology at this time. Stress CM vs. myocarditis vs. ischemic vs. etoh given hx. Recommend formal TTE on 9/16 to see if there has been interval improvement in EF. Would defer CCTA for ischemic eval until DIANDRA improves more   GDMT: D/c'd  on 9/15, would hold GDMT for today. Can determine need for GDMT depending on repeat TTE on 9/16. sBP in the 150s if BP control needed can initiate Hydral 25mg TID and Isordil 10mg TID  Diuretics: Pt is urinating well off any diuretics. Resp status improving as doing well post-extubation. Significant B/L consolidative opacities on CT chest in upper and lower lobes. Would not recommend diuretics at this time given quality UOP.     #HTN  Denies taking any home meds  - Hydral and Isordil as above       Recommendations above are preliminary pending discussion with an attending cardiologist  We'll continue to follow, thank you for the consultation      Shahid Hernandez ([id number])  Cardiovascular Disease Fellow             38-year-old male hypertension, DM2, HLD, hepatitis presents with acute hypoxic respiratory failure c/b undifferentiated shock w/ new HFrEF    Review of Studies:  TTE 9/12/24: Left ventricular systolic function is severely decreased with an ejection fraction of 25 % by Valle's method of disks. Global left ventricular hypokinesis. The right ventricle is not well visualized, probably normal right ventricular systolic function. The cardiac valves are not well seen except for the aortic valve which appears normal. No pericardial effusion seen.  ECG: Sinus tachycardia with left axis deviation, NSST changes     #HFrEF  Etiology: Unclear etiology at this time. Stress CM vs. myocarditis vs. ischemic vs. etoh given hx. Recommend formal TTE on 9/16 to see if there has been interval improvement in EF. Would defer CCTA for ischemic eval until DIANDRA improves more   GDMT: D/c'd  on 9/15, would hold GDMT for today. Can determine need for GDMT depending on repeat TTE on 9/16. sBP in the 150s if BP control needed can initiate Hydral 25mg TID and Isordil 10mg TID  Diuretics: Pt is urinating well off any diuretics. Resp status improving as doing well post-extubation. Significant B/L consolidative opacities on CT chest in upper and lower lobes. Would not recommend diuretics at this time given quality UOP.   - Please tranduce CVP off TLC     #HTN  Denies taking any home meds  - Hydral and Isordil as above       Recommendations above are preliminary pending discussion with an attending cardiologist  We'll continue to follow, thank you for the consultation      Shahid Hernandez ([id number])  Cardiovascular Disease Fellow

## 2024-09-15 NOTE — PATIENT PROFILE ADULT - FUNCTIONAL ASSESSMENT - BASIC MOBILITY 6.
4-calculated by average/Not able to assess (calculate score using [facility] averaging method)

## 2024-09-15 NOTE — PROGRESS NOTE ADULT - ASSESSMENT
38-year-old male hypertension, DM2, HLD, hepatitis presents with acute hypoxic respiratory failure 2/2 hypersensitivity pneumonitis vs eosinophilic pneumonia, cardiogenic shock, and DIANDRA from [facility] for continued management.     NEURO  #intubated and sedated , on propofol and fentanyl.  - will wean sedation in AM and plan for extubation tomorrow AM    CV  #cardiogenic shock  While at [facility] patient was found  to have EF 25% on TTE  with global left ventricular hypokinesis. Initially placed on levophed and dobutamine, Transferred to 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   POCUS on admission 9/14 showed moderate to severe LV dysfunction with global hypokinesis    - Obtian formal TTE  - Continue dobutamine 2.5mg, wean as tolerated  - Goal MAP >65    #HTN  Per chart review, home lisinopril 40mg and HCTZ 25mg but has not taken for past 2 months  - holding home meds iso cardiogenic shock      PULM  #acute hypoxic respiratory failure  Possibly 2/2 hypersensitivity pneumonitis vs eosinophilic pneumonia  Patient  initially presented to [facility] on 9/10 for SOB, PERRY, cough x 2 weeks. Found to have increasing O2 requirements initially placed on NC > HFNC > BiPAP, eventually requiring intubation, was proned 9/12-9/13 and paralyzed per ARDS protocol  CT- chest significant for Multi- focal pneumonia.   Of  note patient was treated with steroids, was noted to improve rapidly after treatment, which was highly concerning for hypersensitivity pneumonitis vs eosinophilic pneumonia  Patient was treated with CTX 9/10-9/14, Azithro 9/10-9/12, Bactrim 9/11-9/12, iv solumedrol 9/11-9/14 at OSH.   s/p Bronchoscopy 9/13.   Repeat CT- chest with improved bilateral infiltrates  Now oxygenating well and not meeting ARDS criteria.     - f/u Aldolase, ANCA, RAMAKRISHNA, aspergillus, blood parasite, coccidioides antibodies, Anti-ccp, FENG, dsDNA, fungitell, histoplasma antigen, hypersensitivity  pneumonitis panel, IgE , Anti geoffrey-1, Myomarker panel ,RF, scleroderma antibodies, sjogren' s antibodies, stool parasite, strongyloides antibodies, toxocara antibodies. RVP.  - holding abx at this time.  -c/w  methylprednisolone 50mg IV BID      RENAL  #DIANDRA  Hess exchanged 9/14  At OSH noted to have increase in BUN and Cr with concern for anuria however, since admission to [facility] patient has been making urine appropriately.  Suspect prerenal in setting of cardiogenic shock with possibly septic shock   - f/u urine lytes  - strict is/os    GI  Plan to extubate in am and start po diet  - c/w PPI       ENDO  #DM  Poorly controlled   A1c 12% during [place] Admission   Per chart review, Home metformin 1000mg BID but has not taken for past 2 months  - discontinue insulin gtt, no indication for DKA  -mISS  - FSG q4hrs    #HLD  Per chart review, on a statin (unknown which one) but not taking past 2 months.  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     NEURO  Extubated 9/15    CV  #cardiogenic shock  While at [facility] patient was found  to have EF 25% on TTE  with global left ventricular hypokinesis. 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   POCUS on admission 9/14 showed moderate to severe LV dysfunction with global hypokinesis    - Obtian formal TTE  - stop dobutamine today  - consult cardiology for eval of etiology of significant cardiac dysfunction  - Goal MAP >65    #HTN  Per chart review, home lisinopril 40mg and HCTZ 25mg but has not taken for past 2 months  - holding home meds iso cardiogenic shock      PULM  #acute hypoxic respiratory failure  Possibly 2/2 hypersensitivity pneumonitis vs eosinophilic pneumonia  Patient  initially presented to [facility] on 9/10 for SOB, PERRY, cough x 2 weeks. Found to have increasing O2 requirements initially placed on NC > HFNC > BiPAP, eventually requiring intubation, was proned 9/12-9/13 and paralyzed per ARDS protocol  CT- chest significant for Multi- focal pneumonia.   Of  note patient was treated with steroids, was noted to improve rapidly after treatment, which was highly concerning for hypersensitivity pneumonitis vs eosinophilic pneumonia  Patient was treated with CTX 9/10-9/14, Azithro 9/10-9/12, Bactrim 9/11-9/12, iv solumedrol 9/11-9/14 at OSH.   s/p Bronchoscopy 9/13.   Repeat CT- chest with improved bilateral infiltrates  Now oxygenating well and not meeting ARDS criteria.     - f/u Aldolase, ANCA, RAMAKRISHNA, aspergillus, blood parasite, coccidioides antibodies, Anti-ccp, FENG, dsDNA, fungitell, histoplasma antigen, hypersensitivity  pneumonitis panel, IgE , Anti geoffrey-1, Myomarker panel ,RF, scleroderma antibodies, sjogren' s antibodies, stool parasite, strongyloides antibodies, toxocara antibodies. RVP.  - holding abx at this time.  -c/w  methylprednisolone 50mg IV BID      RENAL  #DIANDRA  Hess exchanged 9/14  At OSH noted to have increase in BUN and Cr with concern for anuria however, since admission to [facility] patient has been making urine appropriately.  Suspect prerenal in setting of cardiogenic shock with possibly septic shock   - f/u urine lytes  - strict is/os    GI  Will start PO diet pending dysphagia screen  - c/w PPI       ENDO  #DM  Poorly controlled   A1c 12% during [place] Admission   Per chart review, Home metformin 1000mg BID but has not taken for past 2 months  - discontinue insulin gtt, no indication for DKA  -mISS  - FSG q4hrs    #HLD  Per chart review, on a statin (unknown which one) but not taking past 2 months.  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     NEURO  Extubated 9/15    CV  #cardiogenic shock  While at [facility] patient was found  to have EF 25% on TTE  with global left ventricular hypokinesis. 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   POCUS on admission 9/14 showed moderate to severe LV dysfunction with global hypokinesis    - Obtian formal TTE  - stop dobutamine today  - consult cardiology for eval of etiology of significant cardiac dysfunction  - Goal MAP >65    #HTN  Per chart review, home lisinopril 40mg and HCTZ 25mg but has not taken for past 2 months  - holding home meds iso cardiogenic shock      PULM  #acute hypoxic respiratory failure  Possibly 2/2 hypersensitivity pneumonitis vs eosinophilic pneumonia  Patient  initially presented to [facility] on 9/10 for SOB, PERRY, cough x 2 weeks. Found to have increasing O2 requirements initially placed on NC > HFNC > BiPAP, eventually requiring intubation, was proned 9/12-9/13 and paralyzed per ARDS protocol  CT- chest significant for Multi- focal pneumonia.   Of  note patient was treated with steroids, was noted to improve rapidly after treatment, which was highly concerning for hypersensitivity pneumonitis vs eosinophilic pneumonia  Patient was treated with CTX 9/10-9/14, Azithro 9/10-9/12, Bactrim 9/11-9/12, iv solumedrol 9/11-9/14 at OSH.   s/p Bronchoscopy 9/13.   Repeat CT- chest with improved bilateral infiltrates  Now oxygenating well and not meeting ARDS criteria.     - f/u Aldolase, ANCA, RAMAKRISHNA, aspergillus, blood parasite, coccidioides antibodies, Anti-ccp, FENG, dsDNA, fungitell, histoplasma antigen, hypersensitivity  pneumonitis panel, IgE , Anti geoffrey-1, Myomarker panel ,RF, scleroderma antibodies, sjogren' s antibodies, stool parasite, strongyloides antibodies, toxocara antibodies. RVP.  - continue zosyn 4.5 g q 12 (received 3-4 days previously, goal for total 7 days)  -c/w  methylprednisolone 50mg IV BID      RENAL  #DIANDRA  Hess exchanged 9/14  At OSH noted to have increase in BUN and Cr with concern for anuria however, since admission to [facility] patient has been making urine appropriately.  Suspect prerenal in setting of cardiogenic shock with possibly septic shock   - f/u urine lytes  - strict is/os    GI  Will start PO diet pending dysphagia screen  - c/w PPI       ENDO  #DM  Poorly controlled   A1c 12% during [place] Admission   Per chart review, Home metformin 1000mg BID but has not taken for past 2 months  - discontinue insulin gtt, no indication for DKA  - increase to hISS  - will start basal insulin (20U Lantus bedtime)  - FSG q4hrs    #HLD  Per chart review, on a statin (unknown which one) but not taking past 2 months.  - holding home meds    ID  HATTIE    HEME/ONC  #DVT prophylaxis   - heparin subq  - maintain active T&S  - transfuse if Hgb <7      SKIN:  #Lines:       PREVENTIVE   F: as needed  E: Replete if K<4, Mg<2  N: NPO  GI: PPI  DVT: heparin subq  DISPO: MICU

## 2024-09-15 NOTE — PROGRESS NOTE ADULT - SUBJECTIVE AND OBJECTIVE BOX
Patient is a 38y old  Male who presents with a chief complaint of     INTERVAL HPI/OVERNIGHT EVENTS:   Increased tidal volume to 450, increased PEEP to 8 to help with cardiac output and increase alveolar recruitment. CT chest done, showing improved bilateral infiltrates. ofirmev for 100.4 temp. POCUS showing dilated ivc and global hypokinesis, lasix 40 ivp x 1.     ICU Vital Signs Last 24 Hrs  T(C): 37.9 (15 Sep 2024 06:36), Max: 38 (15 Sep 2024 02:30)  T(F): 100.2 (15 Sep 2024 06:36), Max: 100.4 (15 Sep 2024 02:30)  HR: 98 (15 Sep 2024 07:00) (86 - 109)  BP: --  BP(mean): --  ABP: 135/73 (15 Sep 2024 07:00) (115/63 - 146/82)  ABP(mean): 91 (15 Sep 2024 07:00) (76 - 100)  RR: 19 (15 Sep 2024 06:45) (18 - 22)  SpO2: 95% (15 Sep 2024 07:00) (92% - 96%)    O2 Parameters below as of 15 Sep 2024 07:00  Patient On (Oxygen Delivery Method): ventilator    O2 Concentration (%): 50      I&O's Summary    14 Sep 2024 07:01  -  15 Sep 2024 07:00  --------------------------------------------------------  IN: 849.9 mL / OUT: 815 mL / NET: 34.9 mL    15 Sep 2024 07:01  -  15 Sep 2024 08:11  --------------------------------------------------------  IN: 42.7 mL / OUT: 330 mL / NET: -287.3 mL      Mode: AC/ CMV (Assist Control/ Continuous Mandatory Ventilation)  RR (machine): 18  TV (machine): 450  FiO2: 50  PEEP: 8  ITime: 1  MAP: 13  PIP: 24      LABS:                        11.3   8.52  )-----------( 356      ( 15 Sep 2024 05:21 )             34.9     09-15    134<L>  |  104  |  75<H>  ----------------------------<  274<H>  4.8   |  20<L>  |  3.97<H>    Ca    8.4      15 Sep 2024 05:21  Phos  8.6     09-15  Mg     2.7     09-15    TPro  5.9<L>  /  Alb  2.9<L>  /  TBili  <0.2  /  DBili  x   /  AST  13  /  ALT  19  /  AlkPhos  72  09-15    PT/INR - ( 14 Sep 2024 19:04 )   PT: 12.2 sec;   INR: 1.07          PTT - ( 14 Sep 2024 19:04 )  PTT:24.7 sec  Urinalysis Basic - ( 15 Sep 2024 05:21 )    Color: x / Appearance: x / SG: x / pH: x  Gluc: 274 mg/dL / Ketone: x  / Bili: x / Urobili: x   Blood: x / Protein: x / Nitrite: x   Leuk Esterase: x / RBC: x / WBC x   Sq Epi: x / Non Sq Epi: x / Bacteria: x      CAPILLARY BLOOD GLUCOSE      POCT Blood Glucose.: 256 mg/dL (15 Sep 2024 05:54)  POCT Blood Glucose.: 249 mg/dL (15 Sep 2024 02:10)  POCT Blood Glucose.: 230 mg/dL (14 Sep 2024 23:00)  POCT Blood Glucose.: 158 mg/dL (14 Sep 2024 19:42)    ABG - ( 15 Sep 2024 02:18 )  pH, Arterial: 7.28  pH, Blood: x     /  pCO2: 43    /  pO2: 104   / HCO3: 20    / Base Excess: -6.4  /  SaO2: 99.1                RADIOLOGY & ADDITIONAL TESTS:    Consultant(s) Notes Reviewed:  [x ] YES  [ ] NO    MEDICATIONS  (STANDING):  artificial  tears Solution 1 Drop(s) Both EYES four times a day  chlorhexidine 0.12% Liquid 15 milliLiter(s) Oral Mucosa every 12 hours  chlorhexidine 2% Cloths 1 Application(s) Topical <User Schedule>  chlorhexidine 2% Cloths 1 Application(s) Topical <User Schedule>  dextrose 5%. 1000 milliLiter(s) (100 mL/Hr) IV Continuous <Continuous>  dextrose 5%. 1000 milliLiter(s) (50 mL/Hr) IV Continuous <Continuous>  dextrose 50% Injectable 12.5 Gram(s) IV Push once  dextrose 50% Injectable 25 Gram(s) IV Push once  dextrose 50% Injectable 25 Gram(s) IV Push once  DOBUTamine Infusion 2.5 MICROgram(s)/kG/Min (8.23 mL/Hr) IV Continuous <Continuous>  fentaNYL   Infusion. 0.5 MICROgram(s)/kG/Hr (5.49 mL/Hr) IV Continuous <Continuous>  glucagon  Injectable 1 milliGRAM(s) IntraMuscular once  heparin   Injectable 7500 Unit(s) SubCutaneous every 8 hours  insulin lispro (ADMELOG) corrective regimen sliding scale   SubCutaneous every 4 hours  magnesium sulfate  IVPB 1 Gram(s) IV Intermittent once  methylPREDNISolone sodium succinate Injectable 50 milliGRAM(s) IV Push every 12 hours  pantoprazole  Injectable 40 milliGRAM(s) IV Push every 24 hours  propofol Infusion 50 MICROgram(s)/kG/Min (32.9 mL/Hr) IV Continuous <Continuous>    MEDICATIONS  (PRN):  dextrose Oral Gel 15 Gram(s) Oral once PRN Blood Glucose LESS THAN 70 milliGRAM(s)/deciliter  sodium chloride 0.9% lock flush 10 milliLiter(s) IV Push every 1 hour PRN Pre/post blood products, medications, blood draw, and to maintain line patency      PHYSICAL EXAM:  GENERAL: intubated/sedated  HEENT: Atraumatic, normocephalic  NEURO:  A&Ox0, no focal deficits  LUNGS: diminished lung sounds  HEART: RRR  ABD: Soft, non-tender, non-distended, no organomegaly, no appreciable masses, +bs all 4 quadrants  EXTREMITIES: Nontender, +1 pitting edema  SKIN: No rashes or lesions      Care Discussed with Consultants/Other Providers [ x] YES  [ ] NO

## 2024-09-16 ENCOUNTER — RESULT REVIEW (OUTPATIENT)
Age: 39
End: 2024-09-16

## 2024-09-16 LAB
ANION GAP SERPL CALC-SCNC: 11 MMOL/L — SIGNIFICANT CHANGE UP (ref 5–17)
ANION GAP SERPL CALC-SCNC: 11 MMOL/L — SIGNIFICANT CHANGE UP (ref 5–17)
BASOPHILS # BLD AUTO: 0.01 K/UL — SIGNIFICANT CHANGE UP (ref 0–0.2)
BASOPHILS NFR BLD AUTO: 0.1 % — SIGNIFICANT CHANGE UP (ref 0–2)
BUN SERPL-MCNC: 72 MG/DL — HIGH (ref 7–23)
BUN SERPL-MCNC: 73 MG/DL — HIGH (ref 7–23)
CALCIUM SERPL-MCNC: 8.4 MG/DL — SIGNIFICANT CHANGE UP (ref 8.4–10.5)
CALCIUM SERPL-MCNC: 8.4 MG/DL — SIGNIFICANT CHANGE UP (ref 8.4–10.5)
CHLORIDE SERPL-SCNC: 104 MMOL/L — SIGNIFICANT CHANGE UP (ref 96–108)
CHLORIDE SERPL-SCNC: 105 MMOL/L — SIGNIFICANT CHANGE UP (ref 96–108)
CO2 SERPL-SCNC: 20 MMOL/L — LOW (ref 22–31)
CO2 SERPL-SCNC: 21 MMOL/L — LOW (ref 22–31)
CREAT SERPL-MCNC: 2.63 MG/DL — HIGH (ref 0.5–1.3)
CREAT SERPL-MCNC: 2.7 MG/DL — HIGH (ref 0.5–1.3)
CULTURE RESULTS: SIGNIFICANT CHANGE UP
EGFR: 30 ML/MIN/1.73M2 — LOW
EGFR: 31 ML/MIN/1.73M2 — LOW
EOSINOPHIL # BLD AUTO: 0 K/UL — SIGNIFICANT CHANGE UP (ref 0–0.5)
EOSINOPHIL NFR BLD AUTO: 0 % — SIGNIFICANT CHANGE UP (ref 0–6)
FUNGITELL: 42 PG/ML — SIGNIFICANT CHANGE UP
GLUCOSE BLDC GLUCOMTR-MCNC: 178 MG/DL — HIGH (ref 70–99)
GLUCOSE BLDC GLUCOMTR-MCNC: 226 MG/DL — HIGH (ref 70–99)
GLUCOSE BLDC GLUCOMTR-MCNC: 234 MG/DL — HIGH (ref 70–99)
GLUCOSE BLDC GLUCOMTR-MCNC: 392 MG/DL — HIGH (ref 70–99)
GLUCOSE SERPL-MCNC: 272 MG/DL — HIGH (ref 70–99)
GLUCOSE SERPL-MCNC: 284 MG/DL — HIGH (ref 70–99)
HCT VFR BLD CALC: 36.5 % — LOW (ref 39–50)
HGB BLD-MCNC: 11.9 G/DL — LOW (ref 13–17)
IGE SERPL-ACNC: 456 KU/L — HIGH
IMM GRANULOCYTES NFR BLD AUTO: 0.6 % — SIGNIFICANT CHANGE UP (ref 0–0.9)
LYMPHOCYTES # BLD AUTO: 0.83 K/UL — LOW (ref 1–3.3)
LYMPHOCYTES # BLD AUTO: 9.9 % — LOW (ref 13–44)
MAGNESIUM SERPL-MCNC: 2.5 MG/DL — SIGNIFICANT CHANGE UP (ref 1.6–2.6)
MAGNESIUM SERPL-MCNC: 2.6 MG/DL — SIGNIFICANT CHANGE UP (ref 1.6–2.6)
MCHC RBC-ENTMCNC: 27.7 PG — SIGNIFICANT CHANGE UP (ref 27–34)
MCHC RBC-ENTMCNC: 32.6 GM/DL — SIGNIFICANT CHANGE UP (ref 32–36)
MCV RBC AUTO: 85.1 FL — SIGNIFICANT CHANGE UP (ref 80–100)
MONOCYTES # BLD AUTO: 0.49 K/UL — SIGNIFICANT CHANGE UP (ref 0–0.9)
MONOCYTES NFR BLD AUTO: 5.8 % — SIGNIFICANT CHANGE UP (ref 2–14)
NEUTROPHILS # BLD AUTO: 7.02 K/UL — SIGNIFICANT CHANGE UP (ref 1.8–7.4)
NEUTROPHILS NFR BLD AUTO: 83.6 % — HIGH (ref 43–77)
NRBC # BLD: 0 /100 WBCS — SIGNIFICANT CHANGE UP (ref 0–0)
P FALCIPARUM AG BLD QL IA.RAPID: NEGATIVE — SIGNIFICANT CHANGE UP
PHOSPHATE SERPL-MCNC: 6.2 MG/DL — HIGH (ref 2.5–4.5)
PLASMODIUM AG BLD IA.RAPID: NEGATIVE — SIGNIFICANT CHANGE UP
PLATELET # BLD AUTO: 312 K/UL — SIGNIFICANT CHANGE UP (ref 150–400)
POTASSIUM SERPL-MCNC: 4.3 MMOL/L — SIGNIFICANT CHANGE UP (ref 3.5–5.3)
POTASSIUM SERPL-MCNC: SIGNIFICANT CHANGE UP (ref 3.5–5.3)
POTASSIUM SERPL-SCNC: 4.3 MMOL/L — SIGNIFICANT CHANGE UP (ref 3.5–5.3)
POTASSIUM SERPL-SCNC: SIGNIFICANT CHANGE UP (ref 3.5–5.3)
RBC # BLD: 4.29 M/UL — SIGNIFICANT CHANGE UP (ref 4.2–5.8)
RBC # FLD: 11.9 % — SIGNIFICANT CHANGE UP (ref 10.3–14.5)
SODIUM SERPL-SCNC: 135 MMOL/L — SIGNIFICANT CHANGE UP (ref 135–145)
SODIUM SERPL-SCNC: 137 MMOL/L — SIGNIFICANT CHANGE UP (ref 135–145)
SPECIMEN SOURCE: SIGNIFICANT CHANGE UP
WBC # BLD: 8.4 K/UL — SIGNIFICANT CHANGE UP (ref 3.8–10.5)
WBC # FLD AUTO: 8.4 K/UL — SIGNIFICANT CHANGE UP (ref 3.8–10.5)

## 2024-09-16 PROCEDURE — 99233 SBSQ HOSP IP/OBS HIGH 50: CPT | Mod: GC,25

## 2024-09-16 PROCEDURE — 93306 TTE W/DOPPLER COMPLETE: CPT | Mod: 26

## 2024-09-16 PROCEDURE — 93308 TTE F-UP OR LMTD: CPT | Mod: 26

## 2024-09-16 PROCEDURE — 76604 US EXAM CHEST: CPT | Mod: 26

## 2024-09-16 RX ORDER — PANTOPRAZOLE SODIUM 40 MG/1
1 TABLET, DELAYED RELEASE ORAL
Qty: 90 | Refills: 3
Start: 2024-09-16 | End: 2025-09-10

## 2024-09-16 RX ORDER — INSULIN LISPRO 100/ML
9 VIAL (ML) SUBCUTANEOUS
Refills: 0 | Status: DISCONTINUED | OUTPATIENT
Start: 2024-09-16 | End: 2024-09-17

## 2024-09-16 RX ORDER — PREDNISONE 5 MG/1
6 TABLET ORAL
Qty: 600 | Refills: 0
Start: 2024-09-16 | End: 2024-11-04

## 2024-09-16 RX ORDER — PANTOPRAZOLE SODIUM 40 MG/1
40 TABLET, DELAYED RELEASE ORAL
Refills: 0 | Status: DISCONTINUED | OUTPATIENT
Start: 2024-09-16 | End: 2024-09-23

## 2024-09-16 RX ORDER — PREDNISONE 5 MG/1
6 TABLET ORAL
Qty: 300 | Refills: 0
Start: 2024-09-16 | End: 2024-11-04

## 2024-09-16 RX ORDER — INSULIN GLARGINE 300 U/ML
26 INJECTION, SOLUTION SUBCUTANEOUS AT BEDTIME
Refills: 0 | Status: DISCONTINUED | OUTPATIENT
Start: 2024-09-16 | End: 2024-09-19

## 2024-09-16 RX ORDER — ISOSORBIDE DINITRATE 10 MG
5 TABLET ORAL THREE TIMES A DAY
Refills: 0 | Status: DISCONTINUED | OUTPATIENT
Start: 2024-09-16 | End: 2024-09-17

## 2024-09-16 RX ORDER — HYDRALAZINE HYDROCHLORIDE 100 MG/1
10 TABLET ORAL EVERY 8 HOURS
Refills: 0 | Status: DISCONTINUED | OUTPATIENT
Start: 2024-09-16 | End: 2024-09-17

## 2024-09-16 RX ADMIN — PIPERACILLIN SODIUM AND TAZOBACTAM SODIUM 25 GRAM(S): 12; 1.5 INJECTION, POWDER, LYOPHILIZED, FOR SOLUTION INTRAVENOUS at 14:18

## 2024-09-16 RX ADMIN — Medication 1 TABLET(S): at 14:17

## 2024-09-16 RX ADMIN — Medication 7500 UNIT(S): at 14:18

## 2024-09-16 RX ADMIN — Medication 10: at 14:25

## 2024-09-16 RX ADMIN — Medication 4: at 16:50

## 2024-09-16 RX ADMIN — INSULIN GLARGINE 26 UNIT(S): 300 INJECTION, SOLUTION SUBCUTANEOUS at 22:29

## 2024-09-16 RX ADMIN — METHYLPREDNISOLONE ACETATE 50 MILLIGRAM(S): 80 INJECTION, SUSPENSION INTRA-ARTICULAR; INTRALESIONAL; INTRAMUSCULAR; SOFT TISSUE at 05:48

## 2024-09-16 RX ADMIN — PANTOPRAZOLE SODIUM 40 MILLIGRAM(S): 40 TABLET, DELAYED RELEASE ORAL at 14:17

## 2024-09-16 RX ADMIN — METHYLPREDNISOLONE ACETATE 50 MILLIGRAM(S): 80 INJECTION, SUSPENSION INTRA-ARTICULAR; INTRALESIONAL; INTRAMUSCULAR; SOFT TISSUE at 17:54

## 2024-09-16 RX ADMIN — Medication 7500 UNIT(S): at 22:29

## 2024-09-16 RX ADMIN — Medication 4: at 07:12

## 2024-09-16 RX ADMIN — PIPERACILLIN SODIUM AND TAZOBACTAM SODIUM 25 GRAM(S): 12; 1.5 INJECTION, POWDER, LYOPHILIZED, FOR SOLUTION INTRAVENOUS at 05:49

## 2024-09-16 RX ADMIN — Medication 1 DROP(S): at 06:33

## 2024-09-16 RX ADMIN — Medication 1 DROP(S): at 17:27

## 2024-09-16 RX ADMIN — PIPERACILLIN SODIUM AND TAZOBACTAM SODIUM 25 GRAM(S): 12; 1.5 INJECTION, POWDER, LYOPHILIZED, FOR SOLUTION INTRAVENOUS at 22:29

## 2024-09-16 RX ADMIN — CHLORHEXIDINE GLUCONATE ORAL RINSE 1 APPLICATION(S): 1.2 SOLUTION DENTAL at 05:48

## 2024-09-16 RX ADMIN — Medication 7 UNIT(S): at 07:12

## 2024-09-16 RX ADMIN — Medication 9 UNIT(S): at 14:26

## 2024-09-16 RX ADMIN — Medication 7500 UNIT(S): at 05:48

## 2024-09-16 NOTE — DIETITIAN INITIAL EVALUATION ADULT - PERTINENT MEDS FT
MEDICATIONS  (STANDING):  artificial  tears Solution 1 Drop(s) Both EYES four times a day  chlorhexidine 2% Cloths 1 Application(s) Topical <User Schedule>  dextrose 5%. 1000 milliLiter(s) (100 mL/Hr) IV Continuous <Continuous>  dextrose 5%. 1000 milliLiter(s) (50 mL/Hr) IV Continuous <Continuous>  dextrose 50% Injectable 12.5 Gram(s) IV Push once  dextrose 50% Injectable 25 Gram(s) IV Push once  dextrose 50% Injectable 25 Gram(s) IV Push once  glucagon  Injectable 1 milliGRAM(s) IntraMuscular once  heparin   Injectable 7500 Unit(s) SubCutaneous every 8 hours  insulin glargine Injectable (LANTUS) 26 Unit(s) SubCutaneous at bedtime  insulin lispro (ADMELOG) corrective regimen sliding scale   SubCutaneous three times a day before meals  insulin lispro (ADMELOG) corrective regimen sliding scale   SubCutaneous at bedtime  insulin lispro Injectable (ADMELOG) 9 Unit(s) SubCutaneous three times a day before meals  methylPREDNISolone sodium succinate Injectable 50 milliGRAM(s) IV Push every 12 hours  pantoprazole    Tablet 40 milliGRAM(s) Oral before breakfast  piperacillin/tazobactam IVPB.. 4.5 Gram(s) IV Intermittent every 8 hours  trimethoprim  160 mG/sulfamethoxazole 800 mG 1 Tablet(s) Oral <User Schedule>    MEDICATIONS  (PRN):  dextrose Oral Gel 15 Gram(s) Oral once PRN Blood Glucose LESS THAN 70 milliGRAM(s)/deciliter

## 2024-09-16 NOTE — PHYSICAL THERAPY INITIAL EVALUATION ADULT - PERTINENT HX OF CURRENT PROBLEM, REHAB EVAL
Pt. is a 38 y.o male initially presenting to [facility] with cough, PERRY, fevers; was found to have multi-focal PNA; transferred to [facility] MICU for further management i/s/o worsening respiratory status  requiring intubation and cardiogenic shock,

## 2024-09-16 NOTE — PROGRESS NOTE ADULT - ASSESSMENT
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     NEURO  Extubated 9/15    CV  #cardiogenic shock  While at [facility] patient was found  to have EF 25% on TTE  with global left ventricular hypokinesis. 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   POCUS on admission 9/14 showed moderate to severe LV dysfunction with global hypokinesis    - Obtian formal TTE  - stop dobutamine today  - consult cardiology for eval of etiology of significant cardiac dysfunction  - Goal MAP >65    #HTN  Per chart review, home lisinopril 40mg and HCTZ 25mg but has not taken for past 2 months  - holding home meds iso cardiogenic shock      PULM  #acute hypoxic respiratory failure  Possibly 2/2 hypersensitivity pneumonitis vs eosinophilic pneumonia  Patient  initially presented to [facility] on 9/10 for SOB, PERRY, cough x 2 weeks. Found to have increasing O2 requirements initially placed on NC > HFNC > BiPAP, eventually requiring intubation, was proned 9/12-9/13 and paralyzed per ARDS protocol  CT- chest significant for Multi- focal pneumonia.   Of  note patient was treated with steroids, was noted to improve rapidly after treatment, which was highly concerning for hypersensitivity pneumonitis vs eosinophilic pneumonia  Patient was treated with CTX 9/10-9/14, Azithro 9/10-9/12, Bactrim 9/11-9/12, iv solumedrol 9/11-9/14 at OSH.   s/p Bronchoscopy 9/13.   Repeat CT- chest with improved bilateral infiltrates  Now oxygenating well and not meeting ARDS criteria.     - f/u Aldolase, ANCA, RAMAKRISHNA, aspergillus, blood parasite, coccidioides antibodies, Anti-ccp, FENG, dsDNA, fungitell, histoplasma antigen, hypersensitivity  pneumonitis panel, IgE , Anti geoffrey-1, Myomarker panel ,RF, scleroderma antibodies, sjogren' s antibodies, stool parasite, strongyloides antibodies, toxocara antibodies. RVP.  - continue zosyn 4.5 g q 12 (received 3-4 days previously, goal for total 7 days)  -c/w  methylprednisolone 50mg IV BID      RENAL  #DIANDRA  Hess exchanged 9/14  At OSH noted to have increase in BUN and Cr with concern for anuria however, since admission to [facility] patient has been making urine appropriately.  Suspect prerenal in setting of cardiogenic shock with possibly septic shock   - f/u urine lytes  - strict is/os    GI  Will start PO diet pending dysphagia screen  - c/w PPI       ENDO  #DM  Poorly controlled   A1c 12% during [place] Admission   Per chart review, Home metformin 1000mg BID but has not taken for past 2 months  - discontinue insulin gtt, no indication for DKA  - increase to hISS  - will start basal insulin (20U Lantus bedtime)  - FSG q4hrs    #HLD  Per chart review, on a statin (unknown which one) but not taking past 2 months.  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     NEURO  Extubated 9/15    CV  #cardiogenic shock  While at [facility] patient was found  to have EF 25% on TTE  with global left ventricular hypokinesis. 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   POCUS on admission 9/14 showed moderate to severe LV dysfunction with global hypokinesis    - Obtain formal TTE, pending if restarting GDMT  - Dobutamine discontinued, continue to monitor  - Cardiology following for eval of etiology of significant cardiac dysfunction  - Goal MAP >65    #HTN  Per chart review, home lisinopril 40mg and HCTZ 25mg but has not taken for past 2 months  - holding home meds iso cardiogenic shock      PULM  #acute hypoxic respiratory failure  Possibly 2/2 hypersensitivity pneumonitis vs eosinophilic pneumonia  Patient  initially presented to [facility] on 9/10 for SOB, PERRY, cough x 2 weeks. Found to have increasing O2 requirements initially placed on NC > HFNC > BiPAP, eventually requiring intubation, was proned 9/12-9/13 and paralyzed per ARDS protocol  CT- chest significant for Multi- focal pneumonia.   Of  note patient was treated with steroids, was noted to improve rapidly after treatment, which was highly concerning for hypersensitivity pneumonitis vs eosinophilic pneumonia  Patient was treated with CTX 9/10-9/14, Azithro 9/10-9/12, Bactrim 9/11-9/12, iv solumedrol 9/11-9/14 at OSH.   s/p Bronchoscopy 9/13.   Repeat CT- chest with improved bilateral infiltrates  Now oxygenating well and not meeting ARDS criteria.     - f/u Aldolase, ANCA, RAMAKRISHNA, aspergillus, blood parasite, coccidioides antibodies, Anti-ccp, FENG, dsDNA, fungitell, histoplasma antigen, hypersensitivity  pneumonitis panel, IgE , Anti geoffrey-1, Myomarker panel ,RF, scleroderma antibodies, sjogren' s antibodies, stool parasite, strongyloides antibodies, toxocara antibodies. RVP.  - continue zosyn 4.5 g q 12 end date 9/19  -c/w  methylprednisolone 50mg IV BID      RENAL  #DIANDRA  Hess exchanged 9/14  At OSH noted to have increase in BUN and Cr with concern for anuria however, since admission to [facility] patient has been making urine appropriately.  Suspect prerenal in setting of cardiogenic shock with possibly septic shock   - f/u urine lytes  - strict is/os    GI  Extubated, carb consistent diet      ENDO  #DM  Poorly controlled   A1c 12% during [place] Admission   Per chart review, Home metformin 1000mg BID but has not taken for past 2 months  - discontinue insulin gtt, no indication for DKA  - c/w hISS  - c/w basal insulin (20U Lantus bedtime)  - FSG q4hrs    #HLD  Per chart review, on a statin (unknown which one) but not taking past 2 months.  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     NEURO  Extubated 9/15  AxOx3    CV  #cardiogenic shock   While at [facility] patient was found  to have EF 25% on TTE  with global left ventricular hypokinesis. Initially placed on levophed and dobutamine, Transferred to 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 Found to have increasing O2 requirements initially placed on NC > HFNC > BiPAP, eventually requiring intubation, was proned 9/12-9/13 and paralyzed per ARDS protocol  CT- chest significant for Multi- focal pneumonia.   Of  note patient was treated with steroids, was noted to improve rapidly after treatment, which was highly concerning for hypersensitivity pneumonitis vs eosinophilic pneumonia  Patient was treated with CTX 9/10-9/14, Azithro 9/10-9/12, Bactrim 9/11-9/12, iv solumedrol 9/11-9/14 at OSH.   s/p Bronchoscopy 9/13.   Repeat CT- chest with improved bilateral infiltrates  Now oxygenating well and not meeting ARDS criteria.     - f/u Aldolase, ANCA, RAMAKRISHNA, aspergillus, blood parasite, coccidioides antibodies, Anti-ccp, FENG, dsDNA, fungitell, histoplasma antigen, hypersensitivity  pneumonitis panel, IgE , Anti geoffrey-1, Myomarker panel ,RF, scleroderma antibodies, sjogren' s antibodies, stool parasite, strongyloides antibodies, toxocara antibodies. RVP.  - continue zosyn 4.5 g q 12 end date 9/19  -c/w  methylprednisolone 50mg IV BID  - Needs pulmonology outpatient follow up in [place]  - On discharge, needs outpatient Bactrim DS MWF for PCP prophylaxis (long-term steroid use - prednisone 60mg)      RENAL  #DIANDRA  Hess exchanged 9/14  At OSH noted to have increase in BUN and Cr with concern for anuria however, since admission to [facility] patient has been making urine appropriately.  Suspect prerenal in setting of cardiogenic shock with possibly septic shock   - f/u urine lytes  - strict is/os    GI  Extubated, carb consistent diet  - PPI    ENDO  #DM  Poorly controlled   A1c 12% during [place] Admission   Per chart review, Home metformin 1000mg BID but has not taken for past 2 months  - discontinue insulin gtt, no indication for DKA  - c/w hISS  - c/w basal insulin (20U Lantus bedtime)  - FSG q4hrs  - On discharge, needs endocrinology outpatient follow up    #HLD  Per chart review, on a statin (unknown which one) but not taking past 2 months.  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     NEURO  Extubated 9/15  AxOx3    CV  #cardiogenic shock   While at [facility] patient was found  to have EF 25% on TTE  with global left ventricular hypokinesis. Initially placed on levophed and dobutamine, Transferred to 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   POCUS on admission 9/14 showed moderate to severe LV dysfunction with global hypokinesis    - Obtain formal TTE, pending if restarting GDMT  - Dobutamine discontinued, continue to monitor  - Cardiology following for eval of etiology of significant cardiac dysfunction  - Goal MAP >65    #HTN  Per chart review, home lisinopril 40mg and HCTZ 25mg but has not taken for past 2 months  - holding home meds iso cardiogenic shock      PULM  #acute hypoxic respiratory failure  Possibly 2/2 hypersensitivity pneumonitis vs eosinophilic pneumonia  Patient  initially presented to [facility] on 9/10 for SOB, PERRY, cough x 2 weeks. Found to have increasing O2 requirements initially placed on NC > HFNC > BiPAP, eventually requiring intubation, was proned 9/12-9/13 and paralyzed per ARDS protocol  CT- chest significant for Multi- focal pneumonia.   Of  note patient was treated with steroids, was noted to improve rapidly after treatment, which was highly concerning for hypersensitivity pneumonitis vs eosinophilic pneumonia  Patient was treated with CTX 9/10-9/14, Azithro 9/10-9/12, Bactrim 9/11-9/12, iv solumedrol 9/11-9/14 at OSH.   s/p Bronchoscopy 9/13.   Repeat CT- chest with improved bilateral infiltrates  Now oxygenating well and not meeting ARDS criteria.     - f/u Aldolase, ANCA, RAMAKRISHNA, aspergillus, blood parasite, coccidioides antibodies, Anti-ccp, FENG, dsDNA, fungitell, histoplasma antigen, hypersensitivity  pneumonitis panel, IgE , Anti geoffrey-1, Myomarker panel ,RF, scleroderma antibodies, sjogren' s antibodies, stool parasite, strongyloides antibodies, toxocara antibodies. RVP.  - continue zosyn 4.5 g q 12 end date 9/19  -c/w  methylprednisolone 50mg IV BID  - Needs pulmonology outpatient follow up in [place]  - On discharge, needs outpatient Bactrim DS MWF for PCP prophylaxis (long-term steroid use - prednisone 60mg)      RENAL  #DIANDRA  Perez removed 9/16  At OSH noted to have increase in BUN and Cr with concern for anuria however, since admission to [facility] patient has been making urine appropriately.  Suspect prerenal in setting of cardiogenic shock with possibly septic shock   - f/u urine lytes  - strict is/os  - Voided 350cc s/p perez removal    GI  Extubated, carb consistent diet  - PPI    ENDO  #DM  Poorly controlled   A1c 12% during [place] Admission   Per chart review, Home metformin 1000mg BID but has not taken for past 2 months  - discontinue insulin gtt, no indication for DKA  - c/w hISS  - c/w basal insulin (20U Lantus bedtime)  - FSG q4hrs  - On discharge, needs endocrinology outpatient follow up    #HLD  Per chart review, on a statin (unknown which one) but not taking past 2 months.  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     NEURO  Extubated 9/15  AxOx3    CV  #cardiogenic shock   While at [facility] patient was found  to have EF 25% on TTE  with global left ventricular hypokinesis. 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   POCUS on admission 9/14 showed moderate to severe LV dysfunction with global hypokinesis    - Obtain formal TTE, pending if restarting GDMT  - Dobutamine discontinued, continue to monitor  - Cardiology following for eval of etiology of significant cardiac dysfunction  - per cardiology, if sBP in the 150s if BP control needed can initiate Hydral 25mg TID and Isordil 10mg TID  - Goal MAP >65    #HTN  Per chart review, home lisinopril 40mg and HCTZ 25mg but has not taken for past 2 months  - holding home meds iso cardiogenic shock      PULM  #acute hypoxic respiratory failure  Possibly 2/2 hypersensitivity pneumonitis vs eosinophilic pneumonia  Patient  initially presented to [facility] on 9/10 for SOB, PERRY, cough x 2 weeks. Found to have increasing O2 requirements initially placed on NC > HFNC > BiPAP, eventually requiring intubation, was proned 9/12-9/13 and paralyzed per ARDS protocol  CT- chest significant for Multi- focal pneumonia.   Of  note patient was treated with steroids, was noted to improve rapidly after treatment, which was highly concerning for hypersensitivity pneumonitis vs eosinophilic pneumonia  Patient was treated with CTX 9/10-9/14, Azithro 9/10-9/12, Bactrim 9/11-9/12, iv solumedrol 9/11-9/14 at OSH.   s/p Bronchoscopy 9/13.   Repeat CT- chest with improved bilateral infiltrates  Now oxygenating well and not meeting ARDS criteria.     - f/u Aldolase, ANCA, RAMAKRISHNA, aspergillus, blood parasite, coccidioides antibodies, Anti-ccp, FENG, dsDNA, fungitell, histoplasma antigen, hypersensitivity  pneumonitis panel, IgE , Anti geoffrey-1, Myomarker panel ,RF, scleroderma antibodies, sjogren' s antibodies, stool parasite, strongyloides antibodies, toxocara antibodies. RVP.  - continue zosyn 4.5 g q 12 end date 9/19  -c/w  methylprednisolone 50mg IV BID  - Needs pulmonology outpatient follow up in [place]  - On discharge, needs outpatient Bactrim DS MWF for PCP prophylaxis (long-term steroid use - prednisone 60mg)      RENAL  #DIANDRA  Perez removed 9/16  At OSH noted to have increase in BUN and Cr with concern for anuria however, since admission to [facility] patient has been making urine appropriately.  Suspect prerenal in setting of cardiogenic shock with possibly septic shock   - f/u urine lytes  - strict is/os  - Voided 350cc s/p perez removal    GI  Extubated, carb consistent diet  - PPI    ENDO  #DM  Poorly controlled   A1c 12% during [place] Admission   Per chart review, Home metformin 1000mg BID but has not taken for past 2 months  - discontinue insulin gtt, no indication for DKA  - c/w hISS  - c/w basal insulin (20U Lantus bedtime)  - FSG q4hrs  - On discharge, needs endocrinology outpatient follow up    #HLD  Per chart review, on a statin (unknown which one) but not taking past 2 months.  - holding home meds    ID  HATTIE    HEME/ONC  #DVT prophylaxis   - heparin subq  - maintain active T&S  - transfuse if Hgb <7      PREVENTIVE   F: as needed  E: Replete if K<4, Mg<2  N: carb consistent  GI: PPI  DVT: heparin subq  FULL CODE

## 2024-09-16 NOTE — PHYSICAL THERAPY INITIAL EVALUATION ADULT - PREDICTED DURATION OF THERAPY (DAYS/WKS), PT EVAL
Pt. would benefit from continued PT f/u to improve endurance and functional mobility, prevent further deconditioning.

## 2024-09-16 NOTE — DIETITIAN INITIAL EVALUATION ADULT - PERTINENT LABORATORY DATA
09-16    137  |  105  |  72<H>  ----------------------------<  284<H>  4.3   |  21<L>  |  2.70<H>    Ca    8.4      16 Sep 2024 02:39  Phos  6.2     09-16  Mg     2.6     09-16    TPro  5.9<L>  /  Alb  2.9<L>  /  TBili  <0.2  /  DBili  x   /  AST  13  /  ALT  19  /  AlkPhos  72  09-15  POCT Blood Glucose.: 392 mg/dL (09-16-24 @ 14:22)  A1C with Estimated Average Glucose Result: 11.6 % (09-15-24 @ 05:21)

## 2024-09-16 NOTE — PROCEDURE NOTE - NSUS ED ADDITIONAL DETAIL1 FT
Moderately reduced LV function, improved compared to prior exam
replaced perez afterwards
Bilateral lower lobe consolidations. A lines bilaterally,

## 2024-09-16 NOTE — PROGRESS NOTE ADULT - ASSESSMENT
38-year-old male hypertension, DM2, HLD, hepatitis presents with acute hypoxic respiratory failure c/b undifferentiated shock w/ new HFrEF    Review of Studies:  TTE 9/12/24: Left ventricular systolic function is severely decreased with an ejection fraction of 25 % by Valle's method of disks. Global left ventricular hypokinesis. The right ventricle is not well visualized, probably normal right ventricular systolic function. The cardiac valves are not well seen except for the aortic valve which appears normal. No pericardial effusion seen.  ECG: Sinus tachycardia with left axis deviation, NSST changes     #HFrEF  Etiology: Unclear etiology at this time. Stress CM vs. myocarditis vs. ischemic vs. etoh given hx. Would defer CCTA for ischemic eval until DIANDRA improves more   GDMT: Off dobutamine. Prior to initiation of GDMT, would first reassess LVEF with TTE w/ definity to see if there is improvement without the need for GDMT. If LVEF is not recovered, we will plan to initiate first hydral/isordil given his renal failure.  Diuretics: Hold off on active diuresis given patient may have post ATN diuresis.  - Please obtain TSH, HIV, iron studies, urine protein/creatinine, lipid panel, pro-bnp. A1c noted of 11.6    #DM  - Consider type 1 DM work up as well as endocrine consult to help establish care as well as discharge recs for uncontrolled DM    #HTN  Denies taking any home meds  - See GDMT above   38-year-old male hypertension, DM2, HLD, hepatitis presents with acute hypoxic respiratory failure c/b undifferentiated shock w/ new HFrEF    Review of Studies:  TTE 9/16/24: LVIDd 6, LV mildly dilated and mild concentric LVH w/ LVEF 40-45% w/ RWMA, RV normal size and function, LA mildly dilated, RA borderline dilated, trace AR, insufficient TR for PASP, IVC estimated RAP 8   TTE 9/12/24: Left ventricular systolic function is severely decreased with an ejection fraction of 25 % by Valle's method of disks. Global left ventricular hypokinesis. The right ventricle is not well visualized, probably normal right ventricular systolic function. The cardiac valves are not well seen except for the aortic valve which appears normal. No pericardial effusion seen.  ECG: Sinus tachycardia with left axis deviation, NSST changes     #HFrEF  Etiology: Unclear etiology at this time. Stress CM vs. myocarditis vs. ischemic vs. etoh given hx. Would defer CCTA for ischemic eval until DIANDRA improves more   GDMT: Off dobutamine. Prior to initiation of GDMT, would first reassess LVEF with TTE w/ definity to see if there is improvement without the need for GDMT. If LVEF is not recovered, we will plan to initiate first hydral/isordil given his renal failure.  Diuretics: Hold off on active diuresis given patient may have post ATN diuresis.  - Please obtain TSH, HIV, iron studies, urine protein/creatinine, lipid panel, pro-bnp. A1c noted of 11.6    #DM  - Consider type 1 DM work up as well as endocrine consult to help establish care as well as discharge recs for uncontrolled DM    #HTN  Denies taking any home meds  - See GDMT above

## 2024-09-16 NOTE — PROGRESS NOTE ADULT - ASSESSMENT
38-year-old male hypertension, DM2, HLD, hepatitis admitted for treatment of acute hypoxic respiratory failure 2/2 hypersensitivity pneumonitis vs eosinophilic pneumonia, course complicated by cardiogenic shock, now of pressor support, stepped down to medicine telemetry for further management     NEURO  Extubated 9/15    CV  #cardiogenic shock - RESOLVED   While at [facility] patient was found  to have EF 25% on TTE  with global left ventricular hypokinesis requiring  levophed and dobutamine,   Tddx include ischemic cardiomyopathy vs eosinophilic myocardiosis (given high suspension for eosinophilic pneumonia after improvement with IV steroids) vs alcohol induced cardiomyopathy (given history of alcohol use).   POCUS on admission 9/14 showed moderate to severe LV dysfunction with global hypokinesis  Now off pressor support. A-line removed.     Plan  - Obtain formal TTE, pending if restarting GDMT  - Cardiology following, f/u recs   - Goal MAP >65    #HTN  Per chart review, home lisinopril 40mg and HCTZ 25mg but has not taken for past 2 months  Now Off pressor support   Map: 96  Plan   - monitor off medications while MAP > 65         PULM  #acute hypoxic respiratory failure  Possibly 2/2 hypersensitivity pneumonitis vs eosinophilic pneumonia  Patient  initially presented to [facility] on 9/10 for SOB, PERRY, cough x 2 weeks. Found to have increasing O2 requirements initially placed on NC > HFNC > BiPAP, eventually requiring intubation, was proned 9/12-9/13 and paralyzed per ARDS protocol  CT- chest significant for Multi- focal pneumonia.   Of  note patient was treated with steroids, was noted to improve rapidly after treatment, which was highly concerning for hypersensitivity pneumonitis vs eosinophilic pneumonia  Patient was treated with CTX 9/10-9/14, Azithro 9/10-9/12, Bactrim 9/11-9/12, iv solumedrol 9/11-9/14 at OSH.   s/p Bronchoscopy 9/13.   Repeat CT- chest with improved bilateral infiltrates    Plan  - f/u Aldolase, ANCA, RAMAKRISHNA, aspergillus, blood parasite, coccidioides antibodies, Anti-ccp, FENG, dsDNA, fungitell, histoplasma antigen, hypersensitivity  pneumonitis panel, IgE , Anti geoffrey-1, Myomarker panel ,RF, scleroderma antibodies, sjogren' s antibodies, stool parasite, strongyloides antibodies, toxocara antibodies. RVP.  - continue zosyn 4.5 g q 12 end date 9/19  -c/w  methylprednisolone 50mg IV BID      RENAL  #DIANDRA  Hess exchanged 9/14  At OSH noted to have increase in BUN and Cr with concern for anuria, making urine at H   Suspect prerenal in setting of cardiogenic shock with possibly septic shock   Cr. 3.49 ----> 2.70  Plan  - f/u urine lytes  - strict is/os    GI  Extubated, carb consistent diet      ENDO  #DM  Poorly controlled   A1c 12% during [place] Admission   Per chart review, Home metformin 1000mg BID but has not taken for past 2 months  - discontinue insulin gtt, no indication for DKA  - c/w hISS  - c/w basal insulin (20U Lantus bedtime)  - FSG q4hrs    #HLD  Per chart review, on a statin (unknown which one) but not taking past 2 months.  - holding home meds    ID  HATTIE    HEME/ONC  #DVT prophylaxis   - heparin subq  - maintain active T&S  - transfuse if Hgb <7   38-year-old male hypertension, DM2, HLD, hepatitis admitted for treatment of acute hypoxic respiratory failure 2/2 hypersensitivity pneumonitis vs eosinophilic pneumonia, course complicated by cardiogenic shock, now of pressor support, stepped down to medicine telemetry for further management     NEURO  Extubated 9/15    CV  #cardiogenic shock - RESOLVED   While at [facility] patient was found  to have EF 25% on TTE  with global left ventricular hypokinesis requiring  levophed and dobutamine,   Tddx include ischemic cardiomyopathy vs eosinophilic myocardiosis (given high suspension for eosinophilic pneumonia after improvement with IV steroids) vs alcohol induced cardiomyopathy (given history of alcohol use).   POCUS on admission 9/14 showed moderate to severe LV dysfunction with global hypokinesis  Now off pressor support. A-line removed.     Plan  - Cardiology following, f/u recs  - Per Cardiology: Prior to initiation of GDMT, would first reassess LVEF with TTE w/ definity to see if there is improvement without the need for GDMT. If LVEF is not recovered, we will plan to initiate first hydral/isordil given his renal failure.   - Goal MAP >65    #HTN  Per chart review, home lisinopril 40mg and HCTZ 25mg but has not taken for past 2 months, however unsure if patient has been taking medications at home   Now Off pressor support   Map: 96  Plan   - monitor off medications while MAP > 65         PULM  #acute hypoxic respiratory failure  Possibly 2/2 hypersensitivity pneumonitis vs eosinophilic pneumonia  Patient  initially presented to [facility] on 9/10 for SOB, PERRY, cough x 2 weeks. Found to have increasing O2 requirements initially placed on NC > HFNC > BiPAP, eventually requiring intubation, was proned 9/12-9/13 and paralyzed per ARDS protocol  CT- chest significant for Multi- focal pneumonia.   Of  note patient was treated with steroids, was noted to improve rapidly after treatment, which was highly concerning for hypersensitivity pneumonitis vs eosinophilic pneumonia  Patient was treated with CTX 9/10-9/14, Azithro 9/10-9/12, Bactrim 9/11-9/12, iv solumedrol 9/11-9/14 at OSH.   s/p Bronchoscopy 9/13.   Repeat CT- chest with improved bilateral infiltrates    Plan  - f/u Aldolase, ANCA, RAMAKRISHNA, aspergillus, blood parasite, coccidioides antibodies, Anti-ccp, FENG, dsDNA, fungitell, histoplasma antigen, hypersensitivity  pneumonitis panel, IgE , Anti geoffrey-1, Myomarker panel ,RF, scleroderma antibodies, sjogren' s antibodies, stool parasite, strongyloides antibodies, toxocara antibodies. RVP.  - continue zosyn 4.5 g q 12 end date 9/19  -c/w  methylprednisolone 50mg IV BID      RENAL  #DIANDRA  Hess exchanged 9/14  At OSH noted to have increase in BUN and Cr with concern for anuria, making urine at H   Suspect prerenal in setting of cardiogenic shock with possibly septic shock   Cr. 3.49 ----> 2.70  Plan  - f/u urine lytes  - strict is/os    GI  Extubated, carb consistent diet      ENDO  #DM  Poorly controlled   A1c 12% during [place] Admission   Per chart review, Home metformin 1000mg BID but has not taken for past 2 months  - discontinue insulin gtt, no indication for DKA  - c/w hISS  - c/w basal insulin (20U Lantus bedtime)  - FSG q4hrs    #HLD  Per chart review, on a statin (unknown which one) but not taking past 2 months.  - holding home meds    ID  HATTIE    HEME/ONC  #DVT prophylaxis   - heparin subq  - maintain active T&S  - transfuse if Hgb <7   38-year-old male hypertension, DM2, HLD, hepatitis admitted for treatment of acute hypoxic respiratory failure 2/2 hypersensitivity pneumonitis vs eosinophilic pneumonia, course complicated by cardiogenic shock, now of pressor support, stepped down to medicine telemetry for further management     NEURO  Extubated 9/15    CV  #cardiogenic shock - RESOLVED   While at [facility] patient was found  to have EF 25% on TTE  with global left ventricular hypokinesis requiring  levophed and dobutamine,   Tddx include ischemic cardiomyopathy vs eosinophilic myocardiosis (given high suspension for eosinophilic pneumonia after improvement with IV steroids) vs alcohol induced cardiomyopathy (given history of alcohol use).   POCUS on admission 9/14 showed moderate to severe LV dysfunction with global hypokinesis  Now off pressor support. A-line removed.   Plan  - Cardiology following, f/u recs  - Per Cardiology: Prior to initiation of GDMT, would first reassess LVEF with TTE w/ definity to see if there is improvement without the need for GDMT. If LVEF is not recovered, we will plan to initiate first hydral/isordil given his renal failure.   - Goal MAP >65    #HTN  Per chart review, home lisinopril 40mg and HCTZ 25mg but has not taken for past 2 months, however unsure if patient has been taking medications at home   Now Off pressor support   Map: 96  Plan   - monitor off medications while MAP > 65       PULM  #acute hypoxic respiratory failure  Possibly 2/2 hypersensitivity pneumonitis vs eosinophilic pneumonia  Patient  initially presented to [facility] on 9/10 for SOB, PERRY, cough x 2 weeks. Found to have increasing O2 requirements initially placed on NC > HFNC > BiPAP, eventually requiring intubation, was proned 9/12-9/13 and paralyzed per ARDS protocol  CT- chest significant for Multi- focal pneumonia.   Of  note patient was treated with steroids, was noted to improve rapidly after treatment, which was highly concerning for hypersensitivity pneumonitis vs eosinophilic pneumonia  Patient was treated with CTX 9/10-9/14, Azithro 9/10-9/12, Bactrim 9/11-9/12, iv solumedrol 9/11-9/14 at OSH. Noted improvement upon admission to [facility] ICU s/p solumedrol, making hypersensitivity pneumonitis vs eosinophilic pneumonia the leading differential given treatment is the same.   s/p Bronchoscopy 9/13.   Repeat CT- chest with improved bilateral infiltrates  Plan  - f/u Aldolase, ANCA, RAMAKRISHNA, aspergillus, blood parasite, coccidioides antibodies, Anti-ccp, FENG, dsDNA, fungitell, histoplasma antigen, hypersensitivity  pneumonitis panel, IgE , Anti geoffrey-1, Myomarker panel ,RF, scleroderma antibodies, sjogren' s antibodies, stool parasite, strongyloides antibodies, toxocara antibodies. RVP.  - continue zosyn 4.5 g q 12 end date 9/19  -c/w  methylprednisolone 50mg IV BID  - Needs pulmonology outpatient follow up in [place]  - On discharge, needs outpatient Bactrim DS MWF for PCP prophylaxis (long-term steroid use - prednisone 60mg)    RENAL  #DIANDRA  Hess exchanged 9/14  At OSH noted to have increase in BUN and Cr with concern for anuria, making urine at H   Suspect prerenal in setting of cardiogenic shock with possibly septic shock   Cr. 3.49 ----> 2.70  Plan  - f/u urine lytes  - strict is/os    GI  Extubated, carb consistent diet      ENDO  #DM  Poorly controlled   A1c 12% during [place] Admission   Per chart review, Home metformin 1000mg BID but has not taken for past 2 months  - discontinue insulin gtt, no indication for DKA  - c/w hISS  - c/w basal insulin (20U Lantus bedtime)  - FSG q4hrs    #HLD  Per chart review, on a statin (unknown which one) but not taking past 2 months.  - holding home meds    ID  HATTIE    HEME/ONC  #DVT prophylaxis   - heparin subq  - maintain active T&S  - transfuse if Hgb <7    PREVENTIVE   F: as needed  E: Replete if K<4, Mg<2  N: NPO  GI: PPI  DVT: heparin subq  DISPO: 7Lach   38-year-old male hypertension, DM2, HLD, hepatitis admitted for treatment of acute hypoxic respiratory failure 2/2 hypersensitivity pneumonitis vs eosinophilic pneumonia, course complicated by cardiogenic shock, now of pressor support, stepped down to medicine telemetry for further management     NEURO  Extubated 9/15    CV  #cardiogenic shock - RESOLVED   While at [facility] patient was found  to have EF 25% on TTE  with global left ventricular hypokinesis requiring  levophed and dobutamine,   Tddx include ischemic cardiomyopathy vs eosinophilic myocardiosis (given high suspension for eosinophilic pneumonia after improvement with IV steroids) vs alcohol induced cardiomyopathy (given history of alcohol use).   POCUS on admission 9/14 showed moderate to severe LV dysfunction with global hypokinesis  Now off pressor support. A-line removed.   Plan  - Cardiology following, f/u recs  - Per Cardiology: Prior to initiation of GDMT, would first reassess LVEF with TTE w/ definity to see if there is improvement without the need for GDMT. If LVEF is not recovered, we will plan to initiate first hydral/isordil given his renal failure.   - Goal MAP >65    #HTN  Per chart review, home lisinopril 40mg and HCTZ 25mg but has not taken for past 2 months, however unsure if patient has been taking medications at home   Now Off pressor support   Map: 96  Plan   - monitor off medications while MAP > 65       PULM  #acute hypoxic respiratory failure  Possibly 2/2 hypersensitivity pneumonitis vs eosinophilic pneumonia  Patient  initially presented to [facility] on 9/10 for SOB, PERRY, cough x 2 weeks. Found to have increasing O2 requirements initially placed on NC > HFNC > BiPAP, eventually requiring intubation, was proned 9/12-9/13 and paralyzed per ARDS protocol  CT- chest significant for Multi- focal pneumonia.   Of  note patient was treated with steroids, was noted to improve rapidly after treatment, which was highly concerning for hypersensitivity pneumonitis vs eosinophilic pneumonia  Patient was treated with CTX 9/10-9/14, Azithro 9/10-9/12, Bactrim 9/11-9/12, iv solumedrol 9/11-9/14 at OSH. Noted improvement upon admission to [facility] ICU s/p solumedrol, making hypersensitivity pneumonitis vs eosinophilic pneumonia the leading differential given treatment is the same.   s/p Bronchoscopy 9/13.   Repeat CT- chest with improved bilateral infiltrates  Plan  - f/u Aldolase, ANCA, RAMAKRISHNA, aspergillus, blood parasite, coccidioides antibodies, Anti-ccp, FENG, dsDNA, fungitell, histoplasma antigen, hypersensitivity  pneumonitis panel, IgE , Anti geoffrey-1, Myomarker panel ,RF, scleroderma antibodies, sjogren' s antibodies, stool parasite, strongyloides antibodies, toxocara antibodies. RVP.  - continue zosyn 4.5 g q 12 end date 9/19  -c/w  methylprednisolone 50mg IV BID  - Needs pulmonology outpatient follow up in [place]  - On discharge, needs outpatient Bactrim DS MWF for PCP prophylaxis (long-term steroid use - prednisone 60mg)    RENAL  #DIANDRA  Hess exchanged 9/14  At OSH noted to have increase in BUN and Cr with concern for anuria, making urine at [facility]   Suspect prerenal in setting of cardiogenic shock with possibly septic shock   Cr. 3.49 ----> 2.70  CKD-MBD: PO4 elevated at 7, commence  Plan  - sevelamer 800 mg Q 8hr  - f/u iPTH levels  - f/u urine lytes  - strict is/os    GI  Extubated, carb consistent diet      ENDO  #DM  Poorly controlled   A1c 12% during [place] Admission   Per chart review, Home metformin 1000mg BID but has not taken for past 2 months  - discontinue insulin gtt, no indication for DKA  - c/w hISS  - c/w basal insulin (20U Lantus bedtime)  - FSG q4hrs    #HLD  Per chart review, on a statin (unknown which one) but not taking past 2 months.  - holding home meds    ID  HATTIE    HEME/ONC  #DVT prophylaxis   - heparin subq  - maintain active T&S  - transfuse if Hgb <7    PREVENTIVE   F: as needed  E: Replete if K<4, Mg<2  N: NPO  GI: PPI  DVT: heparin subq  DISPO: 7Lach   38-year-old male hypertension, DM2, HLD, hepatitis admitted for treatment of acute hypoxic respiratory failure 2/2 hypersensitivity pneumonitis vs eosinophilic pneumonia, course complicated by cardiogenic shock, now of pressor support, stepped down to medicine telemetry for further management     NEURO  Extubated 9/15    CV  #cardiogenic shock - RESOLVED   While at [facility] patient was found  to have EF 25% on TTE  with global left ventricular hypokinesis requiring  levophed and dobutamine,   Tddx include ischemic cardiomyopathy vs eosinophilic myocardiosis (given high suspension for eosinophilic pneumonia after improvement with IV steroids) vs alcohol induced cardiomyopathy (given history of alcohol use).   POCUS on admission 9/14 showed moderate to severe LV dysfunction with global hypokinesis  Now off pressor support. A-line removed.   Plan  - Cardiology following, f/u recs  - Per Cardiology: Prior to initiation of GDMT, would first reassess LVEF with TTE w/ definity to see if there is improvement without the need for GDMT. If LVEF is not recovered, we will plan to initiate first hydral/isordil given his renal failure.   - Goal MAP >65    #HTN  Per chart review, home lisinopril 40mg and HCTZ 25mg but has not taken for past 2 months, however unsure if patient has been taking medications at home   Now Off pressor support   Map: 96  Plan   - monitor off medications while MAP > 65       PULM  #acute hypoxic respiratory failure  Possibly 2/2 hypersensitivity pneumonitis vs eosinophilic pneumonia  Patient  initially presented to [facility] on 9/10 for SOB, PERRY, cough x 2 weeks. Found to have increasing O2 requirements initially placed on NC > HFNC > BiPAP, eventually requiring intubation, was proned 9/12-9/13 and paralyzed per ARDS protocol  CT- chest significant for Multi- focal pneumonia.   Of  note patient was treated with steroids, was noted to improve rapidly after treatment, which was highly concerning for hypersensitivity pneumonitis vs eosinophilic pneumonia  Patient was treated with CTX 9/10-9/14, Azithro 9/10-9/12, Bactrim 9/11-9/12, iv solumedrol 9/11-9/14 at OSH. Noted improvement upon admission to [facility] ICU s/p solumedrol, making hypersensitivity pneumonitis vs eosinophilic pneumonia the leading differential given treatment is the same.   s/p Bronchoscopy 9/13.   Repeat CT- chest with improved bilateral infiltrates  Plan  - f/u Aldolase, ANCA, RAMAKRISHNA, aspergillus, blood parasite, coccidioides antibodies, Anti-ccp, FENG, dsDNA, fungitell, histoplasma antigen, hypersensitivity  pneumonitis panel, IgE , Anti geoffrey-1, Myomarker panel ,RF, scleroderma antibodies, sjogren' s antibodies, stool parasite, strongyloides antibodies, toxocara antibodies. RVP.  - continue zosyn 4.5 g q 12 end date 9/19  -c/w  methylprednisolone 50mg IV BID  - Needs pulmonology outpatient follow up in [place]  - On discharge, needs outpatient Bactrim DS MWF for PCP prophylaxis (long-term steroid use - prednisone 60mg)    RENAL  #DIANDRA  Hess exchanged 9/14  At OSH noted to have increase in BUN and Cr with concern for anuria, making urine at H   Suspect prerenal in setting of cardiogenic shock with possibly septic shock   Cr. 3.49 ----> 2.70  Plan  - f/u urine lytes  - strict is/os    GI  Extubated, carb consistent diet      ENDO  #DM  Poorly controlled   A1c 12% during [place] Admission   Per chart review, Home metformin 1000mg BID but has not taken for past 2 months  - discontinue insulin gtt, no indication for DKA  - c/w hISS  - c/w basal insulin (20U Lantus bedtime)  - FSG q4hrs    #HLD  Per chart review, on a statin (unknown which one) but not taking past 2 months.  - holding home meds    ID  HATTIE    HEME/ONC  #DVT prophylaxis   - heparin subq  - maintain active T&S  - transfuse if Hgb <7    PREVENTIVE   F: as needed  E: Replete if K<4, Mg<2  N: NPO  GI: PPI  DVT: heparin subq  DISPO: 7Lach

## 2024-09-16 NOTE — PROGRESS NOTE ADULT - ATTENDING COMMENTS
Patient respiratory status continuing to improve able to be weaned to regular nasal cannula. CArdiac function looks moderately reduced to my eye with A line pattern anteriorly. Will let him mobilize volume on his own as he continues to recover from ATN and is making very good urine on his own. Will plan to move out of ICU today. Will need long taper of steroids and follow up with pulmonary as outpatient.

## 2024-09-16 NOTE — PHYSICAL THERAPY INITIAL EVALUATION ADULT - MODALITIES TREATMENT COMMENTS
Pt. currently p/w significantly limited endurance consistent with cardiogenic shock and compromised respiratory status. Pt. was noted with impaired transfers and bed mobility, increased unsteadiness of gait.

## 2024-09-16 NOTE — PROGRESS NOTE ADULT - SUBJECTIVE AND OBJECTIVE BOX
Internal Medicine Progress Note  Melba Arguello, PGY-7 [phone]    ******[facility]******    HOSPITAL COURSE:  38yoM with a PMHx of HTN, DM2 (uncontrolled, A1c 12), HLD, hepatitis initially presented to [facility] 9/10 ER for cough for 2 weeks. Reported cough productive of yellow sputum, runny nose, subjective fevers as well and PERRY. [facility], tested him for COVID (negative), and sent him to the ER. Patient bought a home pulse oximeter that ranged from 89 to 93% this week. In the ED, the patient was found to have a multifocal pneumonia and started on treatment for CAP. He was initially on 3LNC on 9/10 but on 9/11 O2 req increased > HFNC. CT Chest 9/11 extensive multifocal consolidations > transferred to ICU > BiPAP > intubated 9/12. Treated with ARDS protocol, paralyzed, proned 9/12-9/13. Pulm consulted, ddx severe CAP vs  vs PJP. Treated with CTX 9/10-9/14, azithro 9/10-9/12, bactrim 9/11-9/12, iv solumedrol 9/11-9/14. Zosyn 4.5g started 9/13. Bronch 9/13, pending results. HIV neg. Reintubated 9/14 for ??. Also noted to have decreased UOP and ?anuric. Course further c/b newly reduced EF 25% and cardiogenic shock req dobutamine. Admitted to MICU for further management. POCUS on admission 9/14 showed moderate to severe LV dysfunction with global hypokinesis. Cardiology consulted for cardiac dysfunction and dobutamine discontinued 9/15. Pending formal TTE. Patient is stable for stepdown transfer to [facility] for continued management of acute hypoxic respiratory failure possibly  2/2 hypersensitivity pneumonitis vs eosinophilic pneumonia, on Zosyn 4.5 (end date 9/19) and improved cardiogenic shock, previously requiring levophed and dobutamine.            OVERNIGHT EVENTS: No acute overnight events.       Subjective: Patient examined at bedside. Denies chest pain, fever, chills, nausea/vomiting, abdominal pain, SOB.     ROS: All else negative.     OBJECTIVE:  Vital Signs Last 24 Hrs  T(C): 36.5 (16 Sep 2024 05:15), Max: 37.8 (15 Sep 2024 10:08)  T(F): 97.7 (16 Sep 2024 05:15), Max: 100 (15 Sep 2024 10:08)  HR: 75 (16 Sep 2024 06:20) (72 - 96)  BP: 119/75 (16 Sep 2024 06:00) (119/75 - 139/86)  BP(mean): 93 (16 Sep 2024 06:00) (89 - 107)  RR: 18 (16 Sep 2024 06:20) (16 - 22)  SpO2: 95% (16 Sep 2024 06:20) (93% - 97%)    Parameters below as of 16 Sep 2024 07:00  Patient On (Oxygen Delivery Method): nasal cannula, high flow  O2 Flow (L/min): 50  O2 Concentration (%): 50  I&O's Detail    15 Sep 2024 07:01  -  16 Sep 2024 07:00  --------------------------------------------------------  IN:    DOBUTamine: 24.6 mL    FentaNYL: 33 mL    IV PiggyBack: 275 mL    Oral Fluid: 474 mL    Propofol: 75.6 mL  Total IN: 882.2 mL    OUT:    Indwelling Catheter - Urethral (mL): 4310 mL  Total OUT: 4310 mL    Total NET: -3427.8 mL        Physical Exam:  GENERAL: Awake, alert and interactive, no acute distress, appears comfortable  NEURO: A&Ox4, no focal deficits  HEENT: Normocephalic, atraumatic  NECK: Supple, no LAD  CARDIAC: Regular rate and rhythm, +S1/S2  PULM: Breathing comfortably on HFNC, diminished lung sounds  ABDOMEN: Soft, nontender, nondistended  MSK: Range of motion grossly intact, no back tenderness  SKIN: Warm and dry, no rashes, lesions, left arm with bandage at forearm from prior A-line, no warm, erythema or purulent drainage from wound, additional bandage noted on left upper chest below collarbone from prior central line, no erythema or pus noted    VASC: Cap refill < 2 sec, 2+ peripheral pulses, no edema, no LE tenderness    Medications:  MEDICATIONS  (STANDING):  artificial  tears Solution 1 Drop(s) Both EYES four times a day  chlorhexidine 2% Cloths 1 Application(s) Topical <User Schedule>  dextrose 5%. 1000 milliLiter(s) (100 mL/Hr) IV Continuous <Continuous>  dextrose 5%. 1000 milliLiter(s) (50 mL/Hr) IV Continuous <Continuous>  dextrose 50% Injectable 12.5 Gram(s) IV Push once  dextrose 50% Injectable 25 Gram(s) IV Push once  dextrose 50% Injectable 25 Gram(s) IV Push once  glucagon  Injectable 1 milliGRAM(s) IntraMuscular once  heparin   Injectable 7500 Unit(s) SubCutaneous every 8 hours  insulin glargine Injectable (LANTUS) 20 Unit(s) SubCutaneous at bedtime  insulin lispro (ADMELOG) corrective regimen sliding scale   SubCutaneous at bedtime  insulin lispro (ADMELOG) corrective regimen sliding scale   SubCutaneous three times a day before meals  insulin lispro Injectable (ADMELOG) 7 Unit(s) SubCutaneous three times a day before meals  methylPREDNISolone sodium succinate Injectable 50 milliGRAM(s) IV Push every 12 hours  pantoprazole  Injectable 40 milliGRAM(s) IV Push every 24 hours  piperacillin/tazobactam IVPB.. 4.5 Gram(s) IV Intermittent every 12 hours    MEDICATIONS  (PRN):  dextrose Oral Gel 15 Gram(s) Oral once PRN Blood Glucose LESS THAN 70 milliGRAM(s)/deciliter      Labs:                        11.9   8.40  )-----------( 312      ( 16 Sep 2024 02:42 )             36.5     09-16    137  |  105  |  72<H>  ----------------------------<  284<H>  4.3   |  21<L>  |  2.70<H>    Ca    8.4      16 Sep 2024 02:39  Phos  6.2     09-16  Mg     2.6     09-16    TPro  5.9<L>  /  Alb  2.9<L>  /  TBili  <0.2  /  DBili  x   /  AST  13  /  ALT  19  /  AlkPhos  72  09-15    PT/INR - ( 14 Sep 2024 19:04 )   PT: 12.2 sec;   INR: 1.07          PTT - ( 14 Sep 2024 19:04 )  PTT:24.7 sec  ABG - ( 15 Sep 2024 02:18 )  pH, Arterial: 7.28  pH, Blood: x     /  pCO2: 43    /  pO2: 104   / HCO3: 20    / Base Excess: -6.4  /  SaO2: 99.1              Urinalysis Basic - ( 16 Sep 2024 02:39 )    Color: x / Appearance: x / SG: x / pH: x  Gluc: 284 mg/dL / Ketone: x  / Bili: x / Urobili: x   Blood: x / Protein: x / Nitrite: x   Leuk Esterase: x / RBC: x / WBC x   Sq Epi: x / Non Sq Epi: x / Bacteria: x      SARS-CoV-2: NotDetec (14 Sep 2024 21:35)      Radiology: Reviewed ******Transfer to [facility] from MICU******    HOSPITAL COURSE:  38yoM with a PMHx of HTN, DM2 (uncontrolled, A1c 12), HLD, hepatitis initially presented to [facility] 9/10 ER for cough for 2 weeks. Reported cough productive of yellow sputum, runny nose, subjective fevers as well and PERRY. [facility], tested him for COVID (negative), and sent him to the ER. Patient bought a home pulse oximeter that ranged from 89 to 93% this week. In the ED, the patient was found to have a multifocal pneumonia and started on treatment for CAP. He was initially on 3LNC on 9/10 but on 9/11 O2 req increased > HFNC. CT Chest 9/11 extensive multifocal consolidations > transferred to ICU > BiPAP > intubated 9/12. Treated with ARDS protocol, paralyzed, proned 9/12-9/13. Pulm consulted, ddx severe CAP vs  vs PJP. Treated with CTX 9/10-9/14, azithro 9/10-9/12, bactrim 9/11-9/12, iv solumedrol 9/11-9/14. Zosyn 4.5g started 9/13. Bronch 9/13, pending results. HIV neg. Reintubated 9/14 for ??. Also noted to have decreased UOP and ?anuric. Course further c/b newly reduced EF 25% and cardiogenic shock req dobutamine. Admitted to MICU for further management. POCUS on admission 9/14 showed moderate to severe LV dysfunction with global hypokinesis. Cardiology consulted for cardiac dysfunction and dobutamine discontinued 9/15. Pending formal TTE. Patient is stable for stepdown transfer to [facility] for continued management of acute hypoxic respiratory failure possibly  2/2 hypersensitivity pneumonitis vs eosinophilic pneumonia, on Zosyn 4.5 (end date 9/19) and improved cardiogenic shock, previously requiring levophed and dobutamine.            OVERNIGHT EVENTS: No acute overnight events.       Subjective: Patient examined at bedside. Denies chest pain, fever, chills, nausea/vomiting, abdominal pain, SOB.     ROS: All else negative.     OBJECTIVE:  Vital Signs Last 24 Hrs  T(C): 36.5 (16 Sep 2024 05:15), Max: 37.8 (15 Sep 2024 10:08)  T(F): 97.7 (16 Sep 2024 05:15), Max: 100 (15 Sep 2024 10:08)  HR: 75 (16 Sep 2024 06:20) (72 - 96)  BP: 119/75 (16 Sep 2024 06:00) (119/75 - 139/86)  BP(mean): 93 (16 Sep 2024 06:00) (89 - 107)  RR: 18 (16 Sep 2024 06:20) (16 - 22)  SpO2: 95% (16 Sep 2024 06:20) (93% - 97%)    Parameters below as of 16 Sep 2024 07:00  Patient On (Oxygen Delivery Method): nasal cannula, high flow  O2 Flow (L/min): 50  O2 Concentration (%): 50  I&O's Detail    15 Sep 2024 07:01  -  16 Sep 2024 07:00  --------------------------------------------------------  IN:    DOBUTamine: 24.6 mL    FentaNYL: 33 mL    IV PiggyBack: 275 mL    Oral Fluid: 474 mL    Propofol: 75.6 mL  Total IN: 882.2 mL    OUT:    Indwelling Catheter - Urethral (mL): 4310 mL  Total OUT: 4310 mL    Total NET: -3427.8 mL        Physical Exam:  GENERAL: Awake, alert and interactive, no acute distress, appears comfortable  NEURO: A&Ox4, no focal deficits  HEENT: Normocephalic, atraumatic  NECK: Supple, no LAD  CARDIAC: Regular rate and rhythm, +S1/S2  PULM: Breathing comfortably on HFNC, diminished lung sounds  ABDOMEN: Soft, nontender, nondistended  MSK: Range of motion grossly intact, no back tenderness  SKIN: Warm and dry, no rashes, lesions, left arm with bandage at forearm from prior A-line, no warm, erythema or purulent drainage from wound, additional bandage noted on left upper chest below collarbone from prior central line, no erythema or pus noted    VASC: Cap refill < 2 sec, 2+ peripheral pulses, no edema, no LE tenderness    Medications:  MEDICATIONS  (STANDING):  artificial  tears Solution 1 Drop(s) Both EYES four times a day  chlorhexidine 2% Cloths 1 Application(s) Topical <User Schedule>  dextrose 5%. 1000 milliLiter(s) (100 mL/Hr) IV Continuous <Continuous>  dextrose 5%. 1000 milliLiter(s) (50 mL/Hr) IV Continuous <Continuous>  dextrose 50% Injectable 12.5 Gram(s) IV Push once  dextrose 50% Injectable 25 Gram(s) IV Push once  dextrose 50% Injectable 25 Gram(s) IV Push once  glucagon  Injectable 1 milliGRAM(s) IntraMuscular once  heparin   Injectable 7500 Unit(s) SubCutaneous every 8 hours  insulin glargine Injectable (LANTUS) 20 Unit(s) SubCutaneous at bedtime  insulin lispro (ADMELOG) corrective regimen sliding scale   SubCutaneous at bedtime  insulin lispro (ADMELOG) corrective regimen sliding scale   SubCutaneous three times a day before meals  insulin lispro Injectable (ADMELOG) 7 Unit(s) SubCutaneous three times a day before meals  methylPREDNISolone sodium succinate Injectable 50 milliGRAM(s) IV Push every 12 hours  pantoprazole  Injectable 40 milliGRAM(s) IV Push every 24 hours  piperacillin/tazobactam IVPB.. 4.5 Gram(s) IV Intermittent every 12 hours    MEDICATIONS  (PRN):  dextrose Oral Gel 15 Gram(s) Oral once PRN Blood Glucose LESS THAN 70 milliGRAM(s)/deciliter      Labs:                        11.9   8.40  )-----------( 312      ( 16 Sep 2024 02:42 )             36.5     09-16    137  |  105  |  72<H>  ----------------------------<  284<H>  4.3   |  21<L>  |  2.70<H>    Ca    8.4      16 Sep 2024 02:39  Phos  6.2     09-16  Mg     2.6     09-16    TPro  5.9<L>  /  Alb  2.9<L>  /  TBili  <0.2  /  DBili  x   /  AST  13  /  ALT  19  /  AlkPhos  72  09-15    PT/INR - ( 14 Sep 2024 19:04 )   PT: 12.2 sec;   INR: 1.07          PTT - ( 14 Sep 2024 19:04 )  PTT:24.7 sec  ABG - ( 15 Sep 2024 02:18 )  pH, Arterial: 7.28  pH, Blood: x     /  pCO2: 43    /  pO2: 104   / HCO3: 20    / Base Excess: -6.4  /  SaO2: 99.1              Urinalysis Basic - ( 16 Sep 2024 02:39 )    Color: x / Appearance: x / SG: x / pH: x  Gluc: 284 mg/dL / Ketone: x  / Bili: x / Urobili: x   Blood: x / Protein: x / Nitrite: x   Leuk Esterase: x / RBC: x / WBC x   Sq Epi: x / Non Sq Epi: x / Bacteria: x      SARS-CoV-2: NotDetec (14 Sep 2024 21:35)      Radiology: Reviewed

## 2024-09-16 NOTE — DIETITIAN INITIAL EVALUATION ADULT - ADD RECOMMEND
-Continue current diet  -Encourage good PO intake  -Honor food preferences as able  -Weekly wts  -Monitor chemistry, GI function, and skin integrity

## 2024-09-16 NOTE — PROGRESS NOTE ADULT - SUBJECTIVE AND OBJECTIVE BOX
Internal Medicine Progress Note  Melba Arguello, PGY-7 [phone]    ******INCOMPLETE******    HOSPITAL COURSE:        OVERNIGHT EVENTS/INTERVAL HPI:        OBJECTIVE:  Vital Signs Last 24 Hrs  T(C): 36.5 (16 Sep 2024 05:15), Max: 37.8 (15 Sep 2024 10:08)  T(F): 97.7 (16 Sep 2024 05:15), Max: 100 (15 Sep 2024 10:08)  HR: 75 (16 Sep 2024 06:20) (72 - 96)  BP: 119/75 (16 Sep 2024 06:00) (119/75 - 139/86)  BP(mean): 93 (16 Sep 2024 06:00) (89 - 107)  RR: 18 (16 Sep 2024 06:20) (16 - 22)  SpO2: 95% (16 Sep 2024 06:20) (93% - 97%)    Parameters below as of 16 Sep 2024 07:00  Patient On (Oxygen Delivery Method): nasal cannula, high flow  O2 Flow (L/min): 50  O2 Concentration (%): 50  I&O's Detail    15 Sep 2024 07:01  -  16 Sep 2024 07:00  --------------------------------------------------------  IN:    DOBUTamine: 24.6 mL    FentaNYL: 33 mL    IV PiggyBack: 275 mL    Oral Fluid: 474 mL    Propofol: 75.6 mL  Total IN: 882.2 mL    OUT:    Indwelling Catheter - Urethral (mL): 4310 mL  Total OUT: 4310 mL    Total NET: -3427.8 mL        Physical Exam:  GENERAL: Awake, alert and interactive, no acute distress, appears comfortable  NEURO: A&Ox4, no focal deficits, 5/5 strength in all ext, reflexes 2+ throughout, CN 2-12 intact  HEENT: Normocephalic, atraumatic, no conjunctivitis or scleral icterus, oral mucosa moist, no oral lesions noted  NECK: Supple, no LAD, no JVD, thyroid not palpable  CARDIAC: Regular rate and rhythm, +S1/S2, no murmurs/rubs/gallops  PULM: Breathing comfortably on RA, clear to auscultation bilaterally, no wheezes/rales/rhonchi  ABDOMEN: Soft, nontender, nondistended, +bs, no hepatosplenomegaly, no rebound tenderness or fluid wave, no CVA tenderness  : Deferred  MSK: Range of motion grossly intact, no back tenderness  SKIN: Warm and dry, no rashes, lesions  VASC: Cap refil < 2 sec, 2+ peripheral pulses, no edema, no LE tenderness  Psych: Appropriate affect    Medications:  MEDICATIONS  (STANDING):  artificial  tears Solution 1 Drop(s) Both EYES four times a day  chlorhexidine 2% Cloths 1 Application(s) Topical <User Schedule>  dextrose 5%. 1000 milliLiter(s) (100 mL/Hr) IV Continuous <Continuous>  dextrose 5%. 1000 milliLiter(s) (50 mL/Hr) IV Continuous <Continuous>  dextrose 50% Injectable 12.5 Gram(s) IV Push once  dextrose 50% Injectable 25 Gram(s) IV Push once  dextrose 50% Injectable 25 Gram(s) IV Push once  glucagon  Injectable 1 milliGRAM(s) IntraMuscular once  heparin   Injectable 7500 Unit(s) SubCutaneous every 8 hours  insulin glargine Injectable (LANTUS) 20 Unit(s) SubCutaneous at bedtime  insulin lispro (ADMELOG) corrective regimen sliding scale   SubCutaneous at bedtime  insulin lispro (ADMELOG) corrective regimen sliding scale   SubCutaneous three times a day before meals  insulin lispro Injectable (ADMELOG) 7 Unit(s) SubCutaneous three times a day before meals  methylPREDNISolone sodium succinate Injectable 50 milliGRAM(s) IV Push every 12 hours  pantoprazole  Injectable 40 milliGRAM(s) IV Push every 24 hours  piperacillin/tazobactam IVPB.. 4.5 Gram(s) IV Intermittent every 12 hours    MEDICATIONS  (PRN):  dextrose Oral Gel 15 Gram(s) Oral once PRN Blood Glucose LESS THAN 70 milliGRAM(s)/deciliter      Labs:                        11.9   8.40  )-----------( 312      ( 16 Sep 2024 02:42 )             36.5     09-16    137  |  105  |  72<H>  ----------------------------<  284<H>  4.3   |  21<L>  |  2.70<H>    Ca    8.4      16 Sep 2024 02:39  Phos  6.2     09-16  Mg     2.6     09-16    TPro  5.9<L>  /  Alb  2.9<L>  /  TBili  <0.2  /  DBili  x   /  AST  13  /  ALT  19  /  AlkPhos  72  09-15    PT/INR - ( 14 Sep 2024 19:04 )   PT: 12.2 sec;   INR: 1.07          PTT - ( 14 Sep 2024 19:04 )  PTT:24.7 sec  ABG - ( 15 Sep 2024 02:18 )  pH, Arterial: 7.28  pH, Blood: x     /  pCO2: 43    /  pO2: 104   / HCO3: 20    / Base Excess: -6.4  /  SaO2: 99.1              Urinalysis Basic - ( 16 Sep 2024 02:39 )    Color: x / Appearance: x / SG: x / pH: x  Gluc: 284 mg/dL / Ketone: x  / Bili: x / Urobili: x   Blood: x / Protein: x / Nitrite: x   Leuk Esterase: x / RBC: x / WBC x   Sq Epi: x / Non Sq Epi: x / Bacteria: x      SARS-CoV-2: NotDetec (14 Sep 2024 21:35)      Radiology: Reviewed Internal Medicine Progress Note  Melba Arguello, PGY-7 [phone]    ******INCOMPLETE******    HOSPITAL COURSE:  38yoM with a PMHx of HTN, DM2 (uncontrolled, A1c 12), HLD, hepatitis initially presented to [facility] 9/10 ER for cough for 2 weeks. Reported cough productive of yellow sputum, runny nose, subjective fevers as well and PERRY. [facility], tested him for COVID (negative), and sent him to the ER. Patient bought a home pulse oximeter that ranged from 89 to 93% this week. In the ED, the patient was found to have a multifocal pneumonia and started on treatment for CAP. He was initially on 3LNC on 9/10 but on 9/11 O2 req increased > HFNC. CT Chest 9/11 extensive multifocal consolidations > transferred to ICU > BiPAP > intubated 9/12. Treated with ARDS protocol, paralyzed, proned 9/12-9/13. Pulm consulted, ddx severe CAP vs  vs PJP. Treated with CTX 9/10-9/14, azithro 9/10-9/12, bactrim 9/11-9/12, iv solumedrol 9/11-9/14. Zosyn 4.5g started 9/13. Bronch 9/13, pending results. HIV neg. Reintubated 9/14 for ??. Also noted to have decreased UOP and ?anuric. Course further c/b newly reduced EF 25% and cardiogenic shock req dobutamine. Admitted to MICU for further management. POCUS on admission 9/14 showed moderate to severe LV dysfunction with global hypokinesis. Cardiology consulted for cardiac dysfunction and dobutamine discontinued 9/15. Pending formal TTE.  Continue zosyn 4.5 for acute hypoxic respiratory failure possibly  2/2 hypersensitivity pneumonitis vs eosinophilic pneumonia    OVERNIGHT EVENTS/INTERVAL HPI:  No acute events.      OBJECTIVE:  Vital Signs Last 24 Hrs  T(C): 36.5 (16 Sep 2024 05:15), Max: 37.8 (15 Sep 2024 10:08)  T(F): 97.7 (16 Sep 2024 05:15), Max: 100 (15 Sep 2024 10:08)  HR: 75 (16 Sep 2024 06:20) (72 - 96)  BP: 119/75 (16 Sep 2024 06:00) (119/75 - 139/86)  BP(mean): 93 (16 Sep 2024 06:00) (89 - 107)  RR: 18 (16 Sep 2024 06:20) (16 - 22)  SpO2: 95% (16 Sep 2024 06:20) (93% - 97%)    Parameters below as of 16 Sep 2024 07:00  Patient On (Oxygen Delivery Method): nasal cannula, high flow  O2 Flow (L/min): 50  O2 Concentration (%): 50  I&O's Detail    15 Sep 2024 07:01  -  16 Sep 2024 07:00  --------------------------------------------------------  IN:    DOBUTamine: 24.6 mL    FentaNYL: 33 mL    IV PiggyBack: 275 mL    Oral Fluid: 474 mL    Propofol: 75.6 mL  Total IN: 882.2 mL    OUT:    Indwelling Catheter - Urethral (mL): 4310 mL  Total OUT: 4310 mL    Total NET: -3427.8 mL        Physical Exam:  GENERAL: Awake, alert and interactive, no acute distress, appears comfortable  NEURO: A&Ox4, no focal deficits, 5/5 strength in all ext, reflexes 2+ throughout, CN 2-12 intact  HEENT: Normocephalic, atraumatic, no conjunctivitis or scleral icterus, oral mucosa moist, no oral lesions noted  NECK: Supple, no LAD, no JVD, thyroid not palpable  CARDIAC: Regular rate and rhythm, +S1/S2, no murmurs/rubs/gallops  PULM: Breathing comfortably on RA, clear to auscultation bilaterally, no wheezes/rales/rhonchi  ABDOMEN: Soft, nontender, nondistended, +bs, no hepatosplenomegaly, no rebound tenderness or fluid wave, no CVA tenderness  : Deferred  MSK: Range of motion grossly intact, no back tenderness  SKIN: Warm and dry, no rashes, lesions  VASC: Cap refil < 2 sec, 2+ peripheral pulses, no edema, no LE tenderness  Psych: Appropriate affect    Medications:  MEDICATIONS  (STANDING):  artificial  tears Solution 1 Drop(s) Both EYES four times a day  chlorhexidine 2% Cloths 1 Application(s) Topical <User Schedule>  dextrose 5%. 1000 milliLiter(s) (100 mL/Hr) IV Continuous <Continuous>  dextrose 5%. 1000 milliLiter(s) (50 mL/Hr) IV Continuous <Continuous>  dextrose 50% Injectable 12.5 Gram(s) IV Push once  dextrose 50% Injectable 25 Gram(s) IV Push once  dextrose 50% Injectable 25 Gram(s) IV Push once  glucagon  Injectable 1 milliGRAM(s) IntraMuscular once  heparin   Injectable 7500 Unit(s) SubCutaneous every 8 hours  insulin glargine Injectable (LANTUS) 20 Unit(s) SubCutaneous at bedtime  insulin lispro (ADMELOG) corrective regimen sliding scale   SubCutaneous at bedtime  insulin lispro (ADMELOG) corrective regimen sliding scale   SubCutaneous three times a day before meals  insulin lispro Injectable (ADMELOG) 7 Unit(s) SubCutaneous three times a day before meals  methylPREDNISolone sodium succinate Injectable 50 milliGRAM(s) IV Push every 12 hours  pantoprazole  Injectable 40 milliGRAM(s) IV Push every 24 hours  piperacillin/tazobactam IVPB.. 4.5 Gram(s) IV Intermittent every 12 hours    MEDICATIONS  (PRN):  dextrose Oral Gel 15 Gram(s) Oral once PRN Blood Glucose LESS THAN 70 milliGRAM(s)/deciliter      Labs:                        11.9   8.40  )-----------( 312      ( 16 Sep 2024 02:42 )             36.5     09-16    137  |  105  |  72<H>  ----------------------------<  284<H>  4.3   |  21<L>  |  2.70<H>    Ca    8.4      16 Sep 2024 02:39  Phos  6.2     09-16  Mg     2.6     09-16    TPro  5.9<L>  /  Alb  2.9<L>  /  TBili  <0.2  /  DBili  x   /  AST  13  /  ALT  19  /  AlkPhos  72  09-15    PT/INR - ( 14 Sep 2024 19:04 )   PT: 12.2 sec;   INR: 1.07          PTT - ( 14 Sep 2024 19:04 )  PTT:24.7 sec  ABG - ( 15 Sep 2024 02:18 )  pH, Arterial: 7.28  pH, Blood: x     /  pCO2: 43    /  pO2: 104   / HCO3: 20    / Base Excess: -6.4  /  SaO2: 99.1              Urinalysis Basic - ( 16 Sep 2024 02:39 )    Color: x / Appearance: x / SG: x / pH: x  Gluc: 284 mg/dL / Ketone: x  / Bili: x / Urobili: x   Blood: x / Protein: x / Nitrite: x   Leuk Esterase: x / RBC: x / WBC x   Sq Epi: x / Non Sq Epi: x / Bacteria: x      SARS-CoV-2: NotDetec (14 Sep 2024 21:35)      Radiology: Reviewed Internal Medicine Progress Note  Melba Arguello, PGY-7 [phone]    ******INCOMPLETE******    HOSPITAL COURSE:  38yoM with a PMHx of HTN, DM2 (uncontrolled, A1c 12), HLD, hepatitis initially presented to [facility] 9/10 ER for cough for 2 weeks. Reported cough productive of yellow sputum, runny nose, subjective fevers as well and PERRY. [facility], tested him for COVID (negative), and sent him to the ER. Patient bought a home pulse oximeter that ranged from 89 to 93% this week. In the ED, the patient was found to have a multifocal pneumonia and started on treatment for CAP. He was initially on 3LNC on 9/10 but on 9/11 O2 req increased > HFNC. CT Chest 9/11 extensive multifocal consolidations > transferred to ICU > BiPAP > intubated 9/12. Treated with ARDS protocol, paralyzed, proned 9/12-9/13. Pulm consulted, ddx severe CAP vs  vs PJP. Treated with CTX 9/10-9/14, azithro 9/10-9/12, bactrim 9/11-9/12, iv solumedrol 9/11-9/14. Zosyn 4.5g started 9/13. Bronch 9/13, pending results. HIV neg. Reintubated 9/14 for ??. Also noted to have decreased UOP and ?anuric. Course further c/b newly reduced EF 25% and cardiogenic shock req dobutamine. Admitted to MICU for further management. POCUS on admission 9/14 showed moderate to severe LV dysfunction with global hypokinesis. Cardiology consulted for cardiac dysfunction and dobutamine discontinued 9/15. Pending formal TTE.  Continue zosyn 4.5 for acute hypoxic respiratory failure possibly  2/2 hypersensitivity pneumonitis vs eosinophilic pneumonia    OVERNIGHT EVENTS/INTERVAL HPI:  No acute events.      OBJECTIVE:  Vital Signs Last 24 Hrs  T(C): 36.5 (16 Sep 2024 05:15), Max: 37.8 (15 Sep 2024 10:08)  T(F): 97.7 (16 Sep 2024 05:15), Max: 100 (15 Sep 2024 10:08)  HR: 75 (16 Sep 2024 06:20) (72 - 96)  BP: 119/75 (16 Sep 2024 06:00) (119/75 - 139/86)  BP(mean): 93 (16 Sep 2024 06:00) (89 - 107)  RR: 18 (16 Sep 2024 06:20) (16 - 22)  SpO2: 95% (16 Sep 2024 06:20) (93% - 97%)    Parameters below as of 16 Sep 2024 07:00  Patient On (Oxygen Delivery Method): nasal cannula, high flow  O2 Flow (L/min): 50  O2 Concentration (%): 50  I&O's Detail    15 Sep 2024 07:01  -  16 Sep 2024 07:00  --------------------------------------------------------  IN:    DOBUTamine: 24.6 mL    FentaNYL: 33 mL    IV PiggyBack: 275 mL    Oral Fluid: 474 mL    Propofol: 75.6 mL  Total IN: 882.2 mL    OUT:    Indwelling Catheter - Urethral (mL): 4310 mL  Total OUT: 4310 mL    Total NET: -3427.8 mL        Physical Exam:  GENERAL: Awake, alert and interactive, no acute distress, appears comfortable  NEURO: A&Ox4, no focal deficits  HEENT: Normocephalic, atraumatic  NECK: Supple, no LAD  CARDIAC: Regular rate and rhythm, +S1/S2  PULM: Breathing comfortably on HFNC, diminished lung sounds  ABDOMEN: Soft, nontender, nondistended  MSK: Range of motion grossly intact, no back tenderness  SKIN: Warm and dry, no rashes, lesions  VASC: Cap refill < 2 sec, 2+ peripheral pulses, no edema, no LE tenderness    Medications:  MEDICATIONS  (STANDING):  artificial  tears Solution 1 Drop(s) Both EYES four times a day  chlorhexidine 2% Cloths 1 Application(s) Topical <User Schedule>  dextrose 5%. 1000 milliLiter(s) (100 mL/Hr) IV Continuous <Continuous>  dextrose 5%. 1000 milliLiter(s) (50 mL/Hr) IV Continuous <Continuous>  dextrose 50% Injectable 12.5 Gram(s) IV Push once  dextrose 50% Injectable 25 Gram(s) IV Push once  dextrose 50% Injectable 25 Gram(s) IV Push once  glucagon  Injectable 1 milliGRAM(s) IntraMuscular once  heparin   Injectable 7500 Unit(s) SubCutaneous every 8 hours  insulin glargine Injectable (LANTUS) 20 Unit(s) SubCutaneous at bedtime  insulin lispro (ADMELOG) corrective regimen sliding scale   SubCutaneous at bedtime  insulin lispro (ADMELOG) corrective regimen sliding scale   SubCutaneous three times a day before meals  insulin lispro Injectable (ADMELOG) 7 Unit(s) SubCutaneous three times a day before meals  methylPREDNISolone sodium succinate Injectable 50 milliGRAM(s) IV Push every 12 hours  pantoprazole  Injectable 40 milliGRAM(s) IV Push every 24 hours  piperacillin/tazobactam IVPB.. 4.5 Gram(s) IV Intermittent every 12 hours    MEDICATIONS  (PRN):  dextrose Oral Gel 15 Gram(s) Oral once PRN Blood Glucose LESS THAN 70 milliGRAM(s)/deciliter      Labs:                        11.9   8.40  )-----------( 312      ( 16 Sep 2024 02:42 )             36.5     09-16    137  |  105  |  72<H>  ----------------------------<  284<H>  4.3   |  21<L>  |  2.70<H>    Ca    8.4      16 Sep 2024 02:39  Phos  6.2     09-16  Mg     2.6     09-16    TPro  5.9<L>  /  Alb  2.9<L>  /  TBili  <0.2  /  DBili  x   /  AST  13  /  ALT  19  /  AlkPhos  72  09-15    PT/INR - ( 14 Sep 2024 19:04 )   PT: 12.2 sec;   INR: 1.07          PTT - ( 14 Sep 2024 19:04 )  PTT:24.7 sec  ABG - ( 15 Sep 2024 02:18 )  pH, Arterial: 7.28  pH, Blood: x     /  pCO2: 43    /  pO2: 104   / HCO3: 20    / Base Excess: -6.4  /  SaO2: 99.1              Urinalysis Basic - ( 16 Sep 2024 02:39 )    Color: x / Appearance: x / SG: x / pH: x  Gluc: 284 mg/dL / Ketone: x  / Bili: x / Urobili: x   Blood: x / Protein: x / Nitrite: x   Leuk Esterase: x / RBC: x / WBC x   Sq Epi: x / Non Sq Epi: x / Bacteria: x      SARS-CoV-2: NotDetec (14 Sep 2024 21:35)      Radiology: Reviewed Internal Medicine Progress Note  Melba Arguello, PGY-9 [phone]    ******MICU TRANSFER TO [facility]******    HOSPITAL COURSE:  38yoM with a PMHx of HTN, DM2 (uncontrolled, A1c 12), HLD, hepatitis initially presented to [facility] 9/10 ER for cough for 2 weeks. Reported cough productive of yellow sputum, runny nose, subjective fevers as well and PERRY. [facility], tested him for COVID (negative), and sent him to the ER. Patient bought a home pulse oximeter that ranged from 89 to 93% this week. In the ED, the patient was found to have a multifocal pneumonia and started on treatment for CAP. He was initially on 3LNC on 9/10 but on 9/11 O2 req increased > HFNC. CT Chest 9/11 extensive multifocal consolidations > transferred to ICU > BiPAP > intubated 9/12. Treated with ARDS protocol, paralyzed, proned 9/12-9/13. Pulm consulted, ddx severe CAP vs  vs PJP. Treated with CTX 9/10-9/14, azithro 9/10-9/12, bactrim 9/11-9/12, iv solumedrol 9/11-9/14. Zosyn 4.5g started 9/13. Bronch 9/13, pending results. HIV neg. Reintubated 9/14 for ??. Also noted to have decreased UOP and ?anuric. Course further c/b newly reduced EF 25% and cardiogenic shock req dobutamine. Admitted to MICU for further management. POCUS on admission 9/14 showed moderate to severe LV dysfunction with global hypokinesis. Cardiology consulted for cardiac dysfunction and dobutamine discontinued 9/15. Pending formal TTE. Patient is stable for stepdown transfer to [facility] for continued management of acute hypoxic respiratory failure possibly  2/2 hypersensitivity pneumonitis vs eosinophilic pneumonia, on Zosyn 4.5 (end date 9/19) and improved cardiogenic shock, previously requiring levophed and dobutamine.    OVERNIGHT EVENTS/INTERVAL HPI:  No acute events.      OBJECTIVE:  Vital Signs Last 24 Hrs  T(C): 36.5 (16 Sep 2024 05:15), Max: 37.8 (15 Sep 2024 10:08)  T(F): 97.7 (16 Sep 2024 05:15), Max: 100 (15 Sep 2024 10:08)  HR: 75 (16 Sep 2024 06:20) (72 - 96)  BP: 119/75 (16 Sep 2024 06:00) (119/75 - 139/86)  BP(mean): 93 (16 Sep 2024 06:00) (89 - 107)  RR: 18 (16 Sep 2024 06:20) (16 - 22)  SpO2: 95% (16 Sep 2024 06:20) (93% - 97%)    Parameters below as of 16 Sep 2024 07:00  Patient On (Oxygen Delivery Method): nasal cannula, high flow  O2 Flow (L/min): 50  O2 Concentration (%): 50  I&O's Detail    15 Sep 2024 07:01  -  16 Sep 2024 07:00  --------------------------------------------------------  IN:    DOBUTamine: 24.6 mL    FentaNYL: 33 mL    IV PiggyBack: 275 mL    Oral Fluid: 474 mL    Propofol: 75.6 mL  Total IN: 882.2 mL    OUT:    Indwelling Catheter - Urethral (mL): 4310 mL  Total OUT: 4310 mL    Total NET: -3427.8 mL        Physical Exam:  GENERAL: Awake, alert and interactive, no acute distress, appears comfortable  NEURO: A&Ox4, no focal deficits  HEENT: Normocephalic, atraumatic  NECK: Supple, no LAD  CARDIAC: Regular rate and rhythm, +S1/S2  PULM: Breathing comfortably on HFNC, diminished lung sounds  ABDOMEN: Soft, nontender, nondistended  MSK: Range of motion grossly intact, no back tenderness  SKIN: Warm and dry, no rashes, lesions  VASC: Cap refill < 2 sec, 2+ peripheral pulses, no edema, no LE tenderness    Medications:  MEDICATIONS  (STANDING):  artificial  tears Solution 1 Drop(s) Both EYES four times a day  chlorhexidine 2% Cloths 1 Application(s) Topical <User Schedule>  dextrose 5%. 1000 milliLiter(s) (100 mL/Hr) IV Continuous <Continuous>  dextrose 5%. 1000 milliLiter(s) (50 mL/Hr) IV Continuous <Continuous>  dextrose 50% Injectable 12.5 Gram(s) IV Push once  dextrose 50% Injectable 25 Gram(s) IV Push once  dextrose 50% Injectable 25 Gram(s) IV Push once  glucagon  Injectable 1 milliGRAM(s) IntraMuscular once  heparin   Injectable 7500 Unit(s) SubCutaneous every 8 hours  insulin glargine Injectable (LANTUS) 20 Unit(s) SubCutaneous at bedtime  insulin lispro (ADMELOG) corrective regimen sliding scale   SubCutaneous at bedtime  insulin lispro (ADMELOG) corrective regimen sliding scale   SubCutaneous three times a day before meals  insulin lispro Injectable (ADMELOG) 7 Unit(s) SubCutaneous three times a day before meals  methylPREDNISolone sodium succinate Injectable 50 milliGRAM(s) IV Push every 12 hours  pantoprazole  Injectable 40 milliGRAM(s) IV Push every 24 hours  piperacillin/tazobactam IVPB.. 4.5 Gram(s) IV Intermittent every 12 hours    MEDICATIONS  (PRN):  dextrose Oral Gel 15 Gram(s) Oral once PRN Blood Glucose LESS THAN 70 milliGRAM(s)/deciliter      Labs:                        11.9   8.40  )-----------( 312      ( 16 Sep 2024 02:42 )             36.5     09-16    137  |  105  |  72<H>  ----------------------------<  284<H>  4.3   |  21<L>  |  2.70<H>    Ca    8.4      16 Sep 2024 02:39  Phos  6.2     09-16  Mg     2.6     09-16    TPro  5.9<L>  /  Alb  2.9<L>  /  TBili  <0.2  /  DBili  x   /  AST  13  /  ALT  19  /  AlkPhos  72  09-15    PT/INR - ( 14 Sep 2024 19:04 )   PT: 12.2 sec;   INR: 1.07          PTT - ( 14 Sep 2024 19:04 )  PTT:24.7 sec  ABG - ( 15 Sep 2024 02:18 )  pH, Arterial: 7.28  pH, Blood: x     /  pCO2: 43    /  pO2: 104   / HCO3: 20    / Base Excess: -6.4  /  SaO2: 99.1              Urinalysis Basic - ( 16 Sep 2024 02:39 )    Color: x / Appearance: x / SG: x / pH: x  Gluc: 284 mg/dL / Ketone: x  / Bili: x / Urobili: x   Blood: x / Protein: x / Nitrite: x   Leuk Esterase: x / RBC: x / WBC x   Sq Epi: x / Non Sq Epi: x / Bacteria: x      SARS-CoV-2: NotDetec (14 Sep 2024 21:35)      Radiology: Reviewed Internal Medicine Progress Note  Melba Arguello, PGY-2 [phone]    ******MICU TRANSFER TO [facility]******    HOSPITAL COURSE:  38yoM with a PMHx of HTN, DM2 (uncontrolled, A1c 12), HLD, hepatitis initially presented to [facility] 9/10 ER for cough for 2 weeks. Reported cough productive of yellow sputum, runny nose, subjective fevers as well and PERRY. [facility], tested him for COVID (negative), and sent him to the ER. Patient bought a home pulse oximeter that ranged from 89 to 93% this week. In the ED, the patient was found to have a multifocal pneumonia and started on treatment for CAP. He was initially on 3LNC on 9/10 but on 9/11 O2 req increased > HFNC. CT Chest 9/11 extensive multifocal consolidations > transferred to ICU > BiPAP > intubated 9/12. Treated with ARDS protocol, paralyzed, proned 9/12-9/13. Pulm consulted, ddx severe CAP vs  vs PJP. Treated with CTX 9/10-9/14, azithro 9/10-9/12, bactrim 9/11-9/12, iv solumedrol 9/11-9/14. Zosyn 4.5g started 9/13. Bronch 9/13, pending results. HIV neg. Reintubated 9/14 for ??. Also noted to have decreased UOP and ?anuric. Course further c/b newly reduced EF 25% and cardiogenic shock req dobutamine. Admitted to MICU for further management. POCUS on admission 9/14 showed moderate to severe LV dysfunction with global hypokinesis. Cardiology consulted for cardiac dysfunction and dobutamine discontinued 9/15. Pending formal TTE. Patient is stable for stepdown transfer to [facility] for continued management of acute hypoxic respiratory failure possibly  2/2 hypersensitivity pneumonitis vs eosinophilic pneumonia, on Zosyn 4.5 (end date 9/19) and improved cardiogenic shock, previously requiring levophed and dobutamine.    OVERNIGHT EVENTS/INTERVAL HPI:  No acute events.  Patient denying chest pain, SOB, palpitations. Has noticed increased swelling of legs.  Stated he has hx of drinking ~4 glasses of wine each night. Denied having a PCP but "saw one a few weeks ago." Denied taking any home medications or having any medical diagnoses.      OBJECTIVE:  Vital Signs Last 24 Hrs  T(C): 36.5 (16 Sep 2024 05:15), Max: 37.8 (15 Sep 2024 10:08)  T(F): 97.7 (16 Sep 2024 05:15), Max: 100 (15 Sep 2024 10:08)  HR: 75 (16 Sep 2024 06:20) (72 - 96)  BP: 119/75 (16 Sep 2024 06:00) (119/75 - 139/86)  BP(mean): 93 (16 Sep 2024 06:00) (89 - 107)  RR: 18 (16 Sep 2024 06:20) (16 - 22)  SpO2: 95% (16 Sep 2024 06:20) (93% - 97%)    Parameters below as of 16 Sep 2024 07:00  Patient On (Oxygen Delivery Method): nasal cannula, high flow  O2 Flow (L/min): 50  O2 Concentration (%): 50  I&O's Detail    15 Sep 2024 07:01  -  16 Sep 2024 07:00  --------------------------------------------------------  IN:    DOBUTamine: 24.6 mL    FentaNYL: 33 mL    IV PiggyBack: 275 mL    Oral Fluid: 474 mL    Propofol: 75.6 mL  Total IN: 882.2 mL    OUT:    Indwelling Catheter - Urethral (mL): 4310 mL  Total OUT: 4310 mL    Total NET: -3427.8 mL        Physical Exam:  GENERAL: Awake, alert and interactive, no acute distress, appears comfortable  NEURO: A&Ox4, no focal deficits  HEENT: Normocephalic, atraumatic  NECK: Supple, no LAD  CARDIAC: Regular rate and rhythm, +S1/S2  PULM: Breathing comfortably on HFNC, diminished lung sounds  ABDOMEN: Soft, nontender, nondistended  MSK: Range of motion grossly intact, no back tenderness  SKIN: Warm and dry, no rashes, lesions  VASC: Cap refill < 2 sec, 2+ peripheral pulses, no edema, no LE tenderness    Medications:  MEDICATIONS  (STANDING):  artificial  tears Solution 1 Drop(s) Both EYES four times a day  chlorhexidine 2% Cloths 1 Application(s) Topical <User Schedule>  dextrose 5%. 1000 milliLiter(s) (100 mL/Hr) IV Continuous <Continuous>  dextrose 5%. 1000 milliLiter(s) (50 mL/Hr) IV Continuous <Continuous>  dextrose 50% Injectable 12.5 Gram(s) IV Push once  dextrose 50% Injectable 25 Gram(s) IV Push once  dextrose 50% Injectable 25 Gram(s) IV Push once  glucagon  Injectable 1 milliGRAM(s) IntraMuscular once  heparin   Injectable 7500 Unit(s) SubCutaneous every 8 hours  insulin glargine Injectable (LANTUS) 20 Unit(s) SubCutaneous at bedtime  insulin lispro (ADMELOG) corrective regimen sliding scale   SubCutaneous at bedtime  insulin lispro (ADMELOG) corrective regimen sliding scale   SubCutaneous three times a day before meals  insulin lispro Injectable (ADMELOG) 7 Unit(s) SubCutaneous three times a day before meals  methylPREDNISolone sodium succinate Injectable 50 milliGRAM(s) IV Push every 12 hours  pantoprazole  Injectable 40 milliGRAM(s) IV Push every 24 hours  piperacillin/tazobactam IVPB.. 4.5 Gram(s) IV Intermittent every 12 hours    MEDICATIONS  (PRN):  dextrose Oral Gel 15 Gram(s) Oral once PRN Blood Glucose LESS THAN 70 milliGRAM(s)/deciliter      Labs:                        11.9   8.40  )-----------( 312      ( 16 Sep 2024 02:42 )             36.5     09-16    137  |  105  |  72<H>  ----------------------------<  284<H>  4.3   |  21<L>  |  2.70<H>    Ca    8.4      16 Sep 2024 02:39  Phos  6.2     09-16  Mg     2.6     09-16    TPro  5.9<L>  /  Alb  2.9<L>  /  TBili  <0.2  /  DBili  x   /  AST  13  /  ALT  19  /  AlkPhos  72  09-15    PT/INR - ( 14 Sep 2024 19:04 )   PT: 12.2 sec;   INR: 1.07          PTT - ( 14 Sep 2024 19:04 )  PTT:24.7 sec  ABG - ( 15 Sep 2024 02:18 )  pH, Arterial: 7.28  pH, Blood: x     /  pCO2: 43    /  pO2: 104   / HCO3: 20    / Base Excess: -6.4  /  SaO2: 99.1              Urinalysis Basic - ( 16 Sep 2024 02:39 )    Color: x / Appearance: x / SG: x / pH: x  Gluc: 284 mg/dL / Ketone: x  / Bili: x / Urobili: x   Blood: x / Protein: x / Nitrite: x   Leuk Esterase: x / RBC: x / WBC x   Sq Epi: x / Non Sq Epi: x / Bacteria: x      SARS-CoV-2: NotDetec (14 Sep 2024 21:35)      Radiology: Reviewed

## 2024-09-16 NOTE — PROCEDURE NOTE - NSPROCNAME_GEN_A_CORE
Point of Care Ultrasound Lung
Point of Care Ultrasound Cardiac
Point of Care Ultrasound Renal
Point of Care Ultrasound Cardiac

## 2024-09-16 NOTE — DIETITIAN INITIAL EVALUATION ADULT - OTHER INFO
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     Pt care discussed in interdisciplinary care team rounds. Rx and labs reviewed. Pt received resting in bed, alert and participatory in nutrition assessment. Pt reports a fair appetite today and reflective PO intake. Notes a usual body weight of 240 pounds; current body weight of 241 pounds. Pt states he has been losing wt recently on purpose but feels he has lost some wt unintentionally due to illness. Describes an intake of >75% of needs. No complaints of nausea/vomiting/constipation/diarrhea or difficult chew/swallow. Notes an allergy to hazelnuts and almonds; kitchen aware and honoring and ERM updated. RDN to remain available, see recommendations below.     Pain: 0 per chart review   GI: Abdomen non-distended/non-tender, +BS x4, last bowel movement 9/16  Skin: Warm/Dry/Intact, +2 bilateral feet

## 2024-09-16 NOTE — PROGRESS NOTE ADULT - SUBJECTIVE AND OBJECTIVE BOX
Cardiology  Aniya Holly | PGY-4    OVERNIGHT EVENTS: No overnight events    Tele: sinus 70-90  SUBJECTIVE: Denies chest pain, palpitations, dizziness/syncope. Notes some swelling in b/l feet but improvement in dyspnea.      MEDICATIONS  (STANDING):  artificial  tears Solution 1 Drop(s) Both EYES four times a day  chlorhexidine 2% Cloths 1 Application(s) Topical <User Schedule>  dextrose 5%. 1000 milliLiter(s) (100 mL/Hr) IV Continuous <Continuous>  dextrose 5%. 1000 milliLiter(s) (50 mL/Hr) IV Continuous <Continuous>  dextrose 50% Injectable 12.5 Gram(s) IV Push once  dextrose 50% Injectable 25 Gram(s) IV Push once  dextrose 50% Injectable 25 Gram(s) IV Push once  glucagon  Injectable 1 milliGRAM(s) IntraMuscular once  heparin   Injectable 7500 Unit(s) SubCutaneous every 8 hours  insulin glargine Injectable (LANTUS) 26 Unit(s) SubCutaneous at bedtime  insulin lispro (ADMELOG) corrective regimen sliding scale   SubCutaneous three times a day before meals  insulin lispro (ADMELOG) corrective regimen sliding scale   SubCutaneous at bedtime  insulin lispro Injectable (ADMELOG) 9 Unit(s) SubCutaneous three times a day before meals  methylPREDNISolone sodium succinate Injectable 50 milliGRAM(s) IV Push every 12 hours  pantoprazole    Tablet 40 milliGRAM(s) Oral before breakfast  piperacillin/tazobactam IVPB.. 4.5 Gram(s) IV Intermittent every 8 hours  trimethoprim  160 mG/sulfamethoxazole 800 mG 1 Tablet(s) Oral <User Schedule>    MEDICATIONS  (PRN):  dextrose Oral Gel 15 Gram(s) Oral once PRN Blood Glucose LESS THAN 70 milliGRAM(s)/deciliter        T(F): 99.1 (09-16-24 @ 13:36), Max: 99.9 (09-15-24 @ 15:17)  HR: 78 (09-16-24 @ 14:00) (72 - 90)  BP: 126/72 (09-16-24 @ 14:00) (119/75 - 144/76)  BP(mean): 93 (09-16-24 @ 14:00) (89 - 107)  RR: 18 (09-16-24 @ 14:00) (16 - 22)  SpO2: 91% (09-16-24 @ 14:00) (89% - 96%)    PHYSICAL EXAM:     GENERAL: NAD, lying in bed comfortably  HEAD:  Atraumatic, Normocephalic  EYES: EOMI, PERRLA, conjunctiva and sclera clear, no nystagmus noted  ENT: Moist mucous membranes,   NECK: Supple, No JVD, trachea midline  CHEST/LUNG: Clear to auscultation bilaterally; No rales, rhonchi, wheezing, or rubs. Unlabored respirations  HEART: Regular rate and rhythm; No murmurs, rubs, or gallops, normal S1/S2  ABDOMEN: normal bowel sounds; Soft, nontender, nondistended, no organomegaly   EXTREMITIES:  2+ Peripheral Pulses, brisk capillary refill. 1+ pitting edema to ankles  MSK: No gross deformities noted   Neurological:  A&Ox3, no focal deficits   SKIN: No rashes or lesions  PSYCH: Normal mood, affect     TELEMETRY:    LABS:                        11.9   8.40  )-----------( 312      ( 16 Sep 2024 02:42 )             36.5     09-16    137  |  105  |  72<H>  ----------------------------<  284<H>  4.3   |  21<L>  |  2.70<H>    Ca    8.4      16 Sep 2024 02:39  Phos  6.2     09-16  Mg     2.6     09-16    TPro  5.9<L>  /  Alb  2.9<L>  /  TBili  <0.2  /  DBili  x   /  AST  13  /  ALT  19  /  AlkPhos  72  09-15    CARDIAC MARKERS ( 14 Sep 2024 19:04 )  x     / x     / x     / x     / 2.2 ng/mL      PT/INR - ( 14 Sep 2024 19:04 )   PT: 12.2 sec;   INR: 1.07          PTT - ( 14 Sep 2024 19:04 )  PTT:24.7 sec    Creatinine Trend: 2.70<--, 2.63<--, 3.49<--, 3.97<--, 3.57<--  I&O's Summary    15 Sep 2024 07:01  -  16 Sep 2024 07:00  --------------------------------------------------------  IN: 882.2 mL / OUT: 4310 mL / NET: -3427.8 mL    16 Sep 2024 07:01  -  16 Sep 2024 14:21  --------------------------------------------------------  IN: 25 mL / OUT: 600 mL / NET: -575 mL      BNP    RADIOLOGY & ADDITIONAL STUDIES:

## 2024-09-17 LAB
A FUMIGATUS IGG SER QL: 4.4 MCG/ML — SIGNIFICANT CHANGE UP
ALBUMIN SERPL ELPH-MCNC: 2.8 G/DL — LOW (ref 3.3–5)
ALDOLASE SERPL-CCNC: 8.2 U/L — SIGNIFICANT CHANGE UP (ref 3.3–10.3)
ALP SERPL-CCNC: 71 U/L — SIGNIFICANT CHANGE UP (ref 40–120)
ALT FLD-CCNC: 18 U/L — SIGNIFICANT CHANGE UP (ref 10–45)
ALTERNARIA TENUIS/ALTERNATA IGG: 9 MCG/ML — SIGNIFICANT CHANGE UP
ANION GAP SERPL CALC-SCNC: 10 MMOL/L — SIGNIFICANT CHANGE UP (ref 5–17)
ANION GAP SERPL CALC-SCNC: 8 MMOL/L — SIGNIFICANT CHANGE UP (ref 5–17)
AST SERPL-CCNC: 15 U/L — SIGNIFICANT CHANGE UP (ref 10–40)
AUREOBASIDIUM PULLULANS IGG: 5.3 MCG/ML — SIGNIFICANT CHANGE UP
AUTO DIFF PNL BLD: NEGATIVE — SIGNIFICANT CHANGE UP
AUTO DIFF PNL BLD: NEGATIVE — SIGNIFICANT CHANGE UP
BASOPHILS # BLD AUTO: 0.01 K/UL — SIGNIFICANT CHANGE UP (ref 0–0.2)
BASOPHILS NFR BLD AUTO: 0.1 % — SIGNIFICANT CHANGE UP (ref 0–2)
BILIRUB SERPL-MCNC: 0.4 MG/DL — SIGNIFICANT CHANGE UP (ref 0.2–1.2)
BLD GP AB SCN SERPL QL: NEGATIVE — SIGNIFICANT CHANGE UP
BUN SERPL-MCNC: 48 MG/DL — HIGH (ref 7–23)
BUN SERPL-MCNC: 49 MG/DL — HIGH (ref 7–23)
C-ANCA SER-ACNC: NEGATIVE — SIGNIFICANT CHANGE UP
C-ANCA SER-ACNC: NEGATIVE — SIGNIFICANT CHANGE UP
CALCIUM SERPL-MCNC: 8.3 MG/DL — LOW (ref 8.4–10.5)
CALCIUM SERPL-MCNC: 8.3 MG/DL — LOW (ref 8.4–10.5)
CHLORIDE SERPL-SCNC: 108 MMOL/L — SIGNIFICANT CHANGE UP (ref 96–108)
CHLORIDE SERPL-SCNC: 110 MMOL/L — HIGH (ref 96–108)
CHOLEST SERPL-MCNC: 220 MG/DL — HIGH
CO2 SERPL-SCNC: 21 MMOL/L — LOW (ref 22–31)
CO2 SERPL-SCNC: 23 MMOL/L — SIGNIFICANT CHANGE UP (ref 22–31)
CREAT SERPL-MCNC: 1.36 MG/DL — HIGH (ref 0.5–1.3)
CREAT SERPL-MCNC: 1.41 MG/DL — HIGH (ref 0.5–1.3)
EGFR: 65 ML/MIN/1.73M2 — SIGNIFICANT CHANGE UP
EGFR: 68 ML/MIN/1.73M2 — SIGNIFICANT CHANGE UP
EOSINOPHIL # BLD AUTO: 0.03 K/UL — SIGNIFICANT CHANGE UP (ref 0–0.5)
EOSINOPHIL NFR BLD AUTO: 0.3 % — SIGNIFICANT CHANGE UP (ref 0–6)
FERRITIN SERPL-MCNC: 616 NG/ML — HIGH (ref 30–400)
GLUCOSE BLDC GLUCOMTR-MCNC: 135 MG/DL — HIGH (ref 70–99)
GLUCOSE BLDC GLUCOMTR-MCNC: 163 MG/DL — HIGH (ref 70–99)
GLUCOSE BLDC GLUCOMTR-MCNC: 168 MG/DL — HIGH (ref 70–99)
GLUCOSE BLDC GLUCOMTR-MCNC: 182 MG/DL — HIGH (ref 70–99)
GLUCOSE BLDC GLUCOMTR-MCNC: 209 MG/DL — HIGH (ref 70–99)
GLUCOSE SERPL-MCNC: 174 MG/DL — HIGH (ref 70–99)
GLUCOSE SERPL-MCNC: 197 MG/DL — HIGH (ref 70–99)
HCT VFR BLD CALC: 40.9 % — SIGNIFICANT CHANGE UP (ref 39–50)
HDLC SERPL-MCNC: 53 MG/DL — SIGNIFICANT CHANGE UP
HGB BLD-MCNC: 13.6 G/DL — SIGNIFICANT CHANGE UP (ref 13–17)
HIV 1+2 AB+HIV1 P24 AG SERPL QL IA: SIGNIFICANT CHANGE UP
IMM GRANULOCYTES NFR BLD AUTO: 0.8 % — SIGNIFICANT CHANGE UP (ref 0–0.9)
IRON SATN MFR SERPL: 122 UG/DL — SIGNIFICANT CHANGE UP (ref 45–165)
IRON SATN MFR SERPL: 124 UG/DL — SIGNIFICANT CHANGE UP (ref 45–165)
IRON SATN MFR SERPL: 69 % — HIGH (ref 16–55)
IRON SATN MFR SERPL: 71 % — HIGH (ref 16–55)
LACEYELLA SACCHARI IGG: 5.9 MCG/ML — SIGNIFICANT CHANGE UP
LIPID PNL WITH DIRECT LDL SERPL: 120 MG/DL — HIGH
LYMPHOCYTES # BLD AUTO: 1 K/UL — SIGNIFICANT CHANGE UP (ref 1–3.3)
LYMPHOCYTES # BLD AUTO: 9.5 % — LOW (ref 13–44)
MAGNESIUM SERPL-MCNC: 2.1 MG/DL — SIGNIFICANT CHANGE UP (ref 1.6–2.6)
MCHC RBC-ENTMCNC: 28.4 PG — SIGNIFICANT CHANGE UP (ref 27–34)
MCHC RBC-ENTMCNC: 33.3 GM/DL — SIGNIFICANT CHANGE UP (ref 32–36)
MCV RBC AUTO: 85.4 FL — SIGNIFICANT CHANGE UP (ref 80–100)
MICROPOLYSPORA FAENI IGG: <2 MCG/ML — SIGNIFICANT CHANGE UP
MONOCYTES # BLD AUTO: 0.7 K/UL — SIGNIFICANT CHANGE UP (ref 0–0.9)
MONOCYTES NFR BLD AUTO: 6.6 % — SIGNIFICANT CHANGE UP (ref 2–14)
MPO AB + PR3 PNL SER: SIGNIFICANT CHANGE UP
NEUTROPHILS # BLD AUTO: 8.72 K/UL — HIGH (ref 1.8–7.4)
NEUTROPHILS NFR BLD AUTO: 82.7 % — HIGH (ref 43–77)
NON HDL CHOLESTEROL: 167 MG/DL — HIGH
NRBC # BLD: 0 /100 WBCS — SIGNIFICANT CHANGE UP (ref 0–0)
NT-PROBNP SERPL-SCNC: 1806 PG/ML — HIGH (ref 0–300)
P-ANCA SER-ACNC: NEGATIVE — SIGNIFICANT CHANGE UP
P-ANCA SER-ACNC: NEGATIVE — SIGNIFICANT CHANGE UP
PENICILLIUM CHRYSOGENUM/NOTATUM IGG: 4.3 MCG/ML — SIGNIFICANT CHANGE UP
PHOMA BETAE IGG: 8.5 MCG/ML — HIGH
PHOSPHATE SERPL-MCNC: 2.2 MG/DL — LOW (ref 2.5–4.5)
PLATELET # BLD AUTO: 298 K/UL — SIGNIFICANT CHANGE UP (ref 150–400)
POTASSIUM SERPL-MCNC: 3.7 MMOL/L — SIGNIFICANT CHANGE UP (ref 3.5–5.3)
POTASSIUM SERPL-MCNC: 4 MMOL/L — SIGNIFICANT CHANGE UP (ref 3.5–5.3)
POTASSIUM SERPL-SCNC: 3.7 MMOL/L — SIGNIFICANT CHANGE UP (ref 3.5–5.3)
POTASSIUM SERPL-SCNC: 4 MMOL/L — SIGNIFICANT CHANGE UP (ref 3.5–5.3)
PROT SERPL-MCNC: 5.8 G/DL — LOW (ref 6–8.3)
RBC # BLD: 4.79 M/UL — SIGNIFICANT CHANGE UP (ref 4.2–5.8)
RBC # BLD: 4.79 M/UL — SIGNIFICANT CHANGE UP (ref 4.2–5.8)
RBC # FLD: 12.3 % — SIGNIFICANT CHANGE UP (ref 10.3–14.5)
RETICS #: 35 K/UL — SIGNIFICANT CHANGE UP (ref 25–125)
RETICS/RBC NFR: 0.7 % — SIGNIFICANT CHANGE UP (ref 0.5–2.5)
RH IG SCN BLD-IMP: POSITIVE — SIGNIFICANT CHANGE UP
SODIUM SERPL-SCNC: 139 MMOL/L — SIGNIFICANT CHANGE UP (ref 135–145)
SODIUM SERPL-SCNC: 141 MMOL/L — SIGNIFICANT CHANGE UP (ref 135–145)
T CRUZI AB SER-ACNC: SIGNIFICANT CHANGE UP
TIBC SERPL-MCNC: 171 UG/DL — LOW (ref 220–430)
TIBC SERPL-MCNC: 179 UG/DL — LOW (ref 220–430)
TRICHODERMA VIRIDE IGG: 3.5 MCG/ML — SIGNIFICANT CHANGE UP
TRIGL SERPL-MCNC: 267 MG/DL — HIGH
TSH SERPL-MCNC: 1.1 UIU/ML — SIGNIFICANT CHANGE UP (ref 0.27–4.2)
UIBC SERPL-MCNC: 49 UG/DL — LOW (ref 110–370)
UIBC SERPL-MCNC: 55 UG/DL — LOW (ref 110–370)
WBC # BLD: 10.54 K/UL — HIGH (ref 3.8–10.5)
WBC # FLD AUTO: 10.54 K/UL — HIGH (ref 3.8–10.5)

## 2024-09-17 PROCEDURE — 99233 SBSQ HOSP IP/OBS HIGH 50: CPT | Mod: GC

## 2024-09-17 PROCEDURE — 99233 SBSQ HOSP IP/OBS HIGH 50: CPT

## 2024-09-17 RX ORDER — ATORVASTATIN CALCIUM 10 MG/1
40 TABLET, FILM COATED ORAL AT BEDTIME
Refills: 0 | Status: DISCONTINUED | OUTPATIENT
Start: 2024-09-17 | End: 2024-09-23

## 2024-09-17 RX ORDER — INSULIN LISPRO 100/ML
11 VIAL (ML) SUBCUTANEOUS
Refills: 0 | Status: DISCONTINUED | OUTPATIENT
Start: 2024-09-17 | End: 2024-09-23

## 2024-09-17 RX ORDER — SACUBITRIL AND VALSARTAN 97; 103 MG/1; MG/1
1 TABLET, FILM COATED ORAL
Qty: 60 | Refills: 0
Start: 2024-09-17 | End: 2024-10-16

## 2024-09-17 RX ORDER — ACETAMINOPHEN 325 MG
650 TABLET ORAL ONCE
Refills: 0 | Status: COMPLETED | OUTPATIENT
Start: 2024-09-17 | End: 2024-09-17

## 2024-09-17 RX ORDER — HYDRALAZINE HYDROCHLORIDE 100 MG/1
25 TABLET ORAL EVERY 8 HOURS
Refills: 0 | Status: DISCONTINUED | OUTPATIENT
Start: 2024-09-17 | End: 2024-09-18

## 2024-09-17 RX ORDER — FUROSEMIDE 10 MG/ML
20 INJECTION INTRAVENOUS ONCE
Refills: 0 | Status: COMPLETED | OUTPATIENT
Start: 2024-09-17 | End: 2024-09-17

## 2024-09-17 RX ORDER — HYDRALAZINE HYDROCHLORIDE 100 MG/1
10 TABLET ORAL EVERY 8 HOURS
Refills: 0 | Status: DISCONTINUED | OUTPATIENT
Start: 2024-09-17 | End: 2024-09-17

## 2024-09-17 RX ORDER — ISOSORBIDE DINITRATE 10 MG
10 TABLET ORAL THREE TIMES A DAY
Refills: 0 | Status: DISCONTINUED | OUTPATIENT
Start: 2024-09-17 | End: 2024-09-18

## 2024-09-17 RX ORDER — ISOSORBIDE DINITRATE 10 MG
5 TABLET ORAL THREE TIMES A DAY
Refills: 0 | Status: DISCONTINUED | OUTPATIENT
Start: 2024-09-17 | End: 2024-09-17

## 2024-09-17 RX ORDER — SODIUM PHOSPHATE IN D5W 15MMOL/250
15 PLASTIC BAG, INJECTION (ML) INTRAVENOUS ONCE
Refills: 0 | Status: DISCONTINUED | OUTPATIENT
Start: 2024-09-17 | End: 2024-09-23

## 2024-09-17 RX ADMIN — HYDRALAZINE HYDROCHLORIDE 10 MILLIGRAM(S): 100 TABLET ORAL at 14:03

## 2024-09-17 RX ADMIN — PIPERACILLIN SODIUM AND TAZOBACTAM SODIUM 25 GRAM(S): 12; 1.5 INJECTION, POWDER, LYOPHILIZED, FOR SOLUTION INTRAVENOUS at 22:07

## 2024-09-17 RX ADMIN — Medication 5 MILLIGRAM(S): at 16:24

## 2024-09-17 RX ADMIN — Medication 9 UNIT(S): at 16:28

## 2024-09-17 RX ADMIN — Medication 2: at 08:13

## 2024-09-17 RX ADMIN — FUROSEMIDE 20 MILLIGRAM(S): 10 INJECTION INTRAVENOUS at 15:01

## 2024-09-17 RX ADMIN — Medication 1 DROP(S): at 12:50

## 2024-09-17 RX ADMIN — HYDRALAZINE HYDROCHLORIDE 25 MILLIGRAM(S): 100 TABLET ORAL at 22:10

## 2024-09-17 RX ADMIN — PIPERACILLIN SODIUM AND TAZOBACTAM SODIUM 25 GRAM(S): 12; 1.5 INJECTION, POWDER, LYOPHILIZED, FOR SOLUTION INTRAVENOUS at 14:03

## 2024-09-17 RX ADMIN — Medication 10 MILLIGRAM(S): at 22:14

## 2024-09-17 RX ADMIN — METHYLPREDNISOLONE ACETATE 50 MILLIGRAM(S): 80 INJECTION, SUSPENSION INTRA-ARTICULAR; INTRALESIONAL; INTRAMUSCULAR; SOFT TISSUE at 19:41

## 2024-09-17 RX ADMIN — FUROSEMIDE 20 MILLIGRAM(S): 10 INJECTION INTRAVENOUS at 12:46

## 2024-09-17 RX ADMIN — ATORVASTATIN CALCIUM 40 MILLIGRAM(S): 10 TABLET, FILM COATED ORAL at 22:07

## 2024-09-17 RX ADMIN — PIPERACILLIN SODIUM AND TAZOBACTAM SODIUM 25 GRAM(S): 12; 1.5 INJECTION, POWDER, LYOPHILIZED, FOR SOLUTION INTRAVENOUS at 06:46

## 2024-09-17 RX ADMIN — Medication 9 UNIT(S): at 12:50

## 2024-09-17 RX ADMIN — Medication 7500 UNIT(S): at 14:02

## 2024-09-17 RX ADMIN — CHLORHEXIDINE GLUCONATE ORAL RINSE 1 APPLICATION(S): 1.2 SOLUTION DENTAL at 06:47

## 2024-09-17 RX ADMIN — Medication 1 DROP(S): at 00:30

## 2024-09-17 RX ADMIN — PANTOPRAZOLE SODIUM 40 MILLIGRAM(S): 40 TABLET, DELAYED RELEASE ORAL at 06:43

## 2024-09-17 RX ADMIN — Medication 4: at 12:49

## 2024-09-17 RX ADMIN — Medication 7500 UNIT(S): at 06:42

## 2024-09-17 RX ADMIN — Medication 5 MILLIGRAM(S): at 06:43

## 2024-09-17 RX ADMIN — Medication 650 MILLIGRAM(S): at 15:01

## 2024-09-17 RX ADMIN — METHYLPREDNISOLONE ACETATE 50 MILLIGRAM(S): 80 INJECTION, SUSPENSION INTRA-ARTICULAR; INTRALESIONAL; INTRAMUSCULAR; SOFT TISSUE at 08:25

## 2024-09-17 RX ADMIN — Medication 7500 UNIT(S): at 22:11

## 2024-09-17 RX ADMIN — INSULIN GLARGINE 26 UNIT(S): 300 INJECTION, SOLUTION SUBCUTANEOUS at 22:07

## 2024-09-17 RX ADMIN — Medication 2: at 16:28

## 2024-09-17 RX ADMIN — HYDRALAZINE HYDROCHLORIDE 10 MILLIGRAM(S): 100 TABLET ORAL at 06:43

## 2024-09-17 RX ADMIN — Medication 1 DROP(S): at 06:45

## 2024-09-17 NOTE — PROGRESS NOTE ADULT - SUBJECTIVE AND OBJECTIVE BOX
Cardiology  Aniya Holly | PGY-4    OVERNIGHT EVENTS: No overnight events    Tele: sinus 70-95  SUBJECTIVE: Denies chest pain, palpitations, dizziness/syncope, worsening b/l LE edema. Appears lethargic and is unsure if there has been improvment in dyspnea.      MEDICATIONS  (STANDING):  artificial  tears Solution 1 Drop(s) Both EYES four times a day  chlorhexidine 2% Cloths 1 Application(s) Topical <User Schedule>  dextrose 5%. 1000 milliLiter(s) (100 mL/Hr) IV Continuous <Continuous>  dextrose 5%. 1000 milliLiter(s) (50 mL/Hr) IV Continuous <Continuous>  dextrose 50% Injectable 12.5 Gram(s) IV Push once  dextrose 50% Injectable 25 Gram(s) IV Push once  dextrose 50% Injectable 25 Gram(s) IV Push once  glucagon  Injectable 1 milliGRAM(s) IntraMuscular once  heparin   Injectable 7500 Unit(s) SubCutaneous every 8 hours  hydrALAZINE 10 milliGRAM(s) Oral every 8 hours  insulin glargine Injectable (LANTUS) 26 Unit(s) SubCutaneous at bedtime  insulin lispro (ADMELOG) corrective regimen sliding scale   SubCutaneous at bedtime  insulin lispro (ADMELOG) corrective regimen sliding scale   SubCutaneous three times a day before meals  insulin lispro Injectable (ADMELOG) 9 Unit(s) SubCutaneous three times a day before meals  isosorbide   dinitrate Tablet (ISORDIL) 5 milliGRAM(s) Oral three times a day  methylPREDNISolone sodium succinate Injectable 50 milliGRAM(s) IV Push every 12 hours  pantoprazole    Tablet 40 milliGRAM(s) Oral before breakfast  piperacillin/tazobactam IVPB.. 4.5 Gram(s) IV Intermittent every 8 hours  sodium phosphate 15 milliMole(s)/250 mL IVPB 15 milliMole(s) IV Intermittent once  trimethoprim  160 mG/sulfamethoxazole 800 mG 1 Tablet(s) Oral <User Schedule>    MEDICATIONS  (PRN):  dextrose Oral Gel 15 Gram(s) Oral once PRN Blood Glucose LESS THAN 70 milliGRAM(s)/deciliter        T(F): 98.8 (09-17-24 @ 10:00), Max: 99.1 (09-16-24 @ 13:36)  HR: 82 (09-17-24 @ 12:42) (72 - 88)  BP: 162/98 (09-17-24 @ 12:42) (125/71 - 162/98)  BP(mean): 124 (09-17-24 @ 12:42) (93 - 124)  RR: 19 (09-17-24 @ 12:42) (12 - 19)  SpO2: 95% (09-17-24 @ 12:42) (91% - 95%)    PHYSICAL EXAM:     GENERAL: NAD, lethargic  HEAD:  Atraumatic, Normocephalic  EYES: EOMI, PERRLA, conjunctiva and sclera clear, no nystagmus noted  ENT: Moist mucous membranes,   NECK: Supple, No JVD, trachea midline  CHEST/LUNG: Clear to auscultation bilaterally; No rales, rhonchi, wheezing, or rubs. Unlabored respirations  HEART: Regular rate and rhythm; No murmurs, rubs, or gallops, normal S1/S2  ABDOMEN: normal bowel sounds; Soft, nontender, nondistended, no organomegaly   EXTREMITIES:  2+ Peripheral Pulses, brisk capillary refill. 1+ pitting edema to ankles  MSK: No gross deformities noted   Neurological:  A&Ox3, no focal deficits   SKIN: No rashes or lesions  PSYCH: Normal mood, affect     TELEMETRY:    LABS:                        13.6   10.54 )-----------( 298      ( 17 Sep 2024 10:00 )             40.9     09-17    139  |  108  |  49[H]  ----------------------------<  174[H]  3.7   |  23  |  1.41[H]    Ca    8.3[L]      17 Sep 2024 10:00  Phos  2.2     09-17  Mg     2.1     09-17    TPro  5.8[L]  /  Alb  2.8[L]  /  TBili  0.4  /  DBili  x   /  AST  15  /  ALT  18  /  AlkPhos  71  09-17            Creatinine Trend: 1.41<--, 2.70<--, 2.63<--, 3.49<--, 3.97<--, 3.57<--  I&O's Summary    16 Sep 2024 07:01  -  17 Sep 2024 07:00  --------------------------------------------------------  IN: 75 mL / OUT: 900 mL / NET: -825 mL      BNP    RADIOLOGY & ADDITIONAL STUDIES:

## 2024-09-17 NOTE — CONSULT NOTE ADULT - SUBJECTIVE AND OBJECTIVE BOX
HOSPITAL COURSE  BARRY MYRICK is a 38y Male with a past medical history of HTN, DM2 (uncontrolled, A1c 12), HLD, hepatitis initially presented to [facility] 9/10 ER for cough for 2 weeks, associated PERRY. Pt admitted for multifocal pneumonia c/b AHRF requiring HFNC -->BiPAP s/p intubation x2 on 9/12 and on 9/14. S/p ARDS protocol, proned 9/12-9/13. ddx severe CAP vs  vs PJP. S/p CTX 9/10-9/14, azithro 9/10-9/12, bactrim 9/11-9/12, iv solumedrol 9/11-9/14. Zosyn 4.5g started 9/13. Bronch 9/13, pending results. HIV neg. Course further c/b newly reduced EF 25% and cardiogenic shock s/p dobutamine gtt on 9/15. Stepped down to telemetry for management of acute hypoxic respiratory failure possibly  2/2 hypersensitivity pneumonitis vs eosinophilic pneumonia.     Endocrinology has been consulted for Diabetes Management.    DIABETES HISTORY  - Age at diagnosis:   - Diabetes managed outpatient by:  - Current Therapy:  - History of other regimens:   - Home glucose readings:  - History of DKA/HHS:   - Diabetic Complications:   - Diet:        FAMILY HISTORY  - Diabetes:    SOCIAL HISTORY  - Work:  - Alcohol:  - Smoking:      CURRENT MEDICATIONS  artificial  tears Solution 1 Drop(s) Both EYES four times a day  chlorhexidine 2% Cloths 1 Application(s) Topical <User Schedule>  dextrose 5%. 1000 milliLiter(s) IV Continuous <Continuous>  dextrose 5%. 1000 milliLiter(s) IV Continuous <Continuous>  dextrose 50% Injectable 12.5 Gram(s) IV Push once  dextrose 50% Injectable 25 Gram(s) IV Push once  dextrose 50% Injectable 25 Gram(s) IV Push once  dextrose Oral Gel 15 Gram(s) Oral once PRN  glucagon  Injectable 1 milliGRAM(s) IntraMuscular once  heparin   Injectable 7500 Unit(s) SubCutaneous every 8 hours  hydrALAZINE 10 milliGRAM(s) Oral every 8 hours  insulin glargine Injectable (LANTUS) 26 Unit(s) SubCutaneous at bedtime  insulin lispro (ADMELOG) corrective regimen sliding scale   SubCutaneous at bedtime  insulin lispro (ADMELOG) corrective regimen sliding scale   SubCutaneous three times a day before meals  insulin lispro Injectable (ADMELOG) 9 Unit(s) SubCutaneous three times a day before meals  isosorbide   dinitrate Tablet (ISORDIL) 5 milliGRAM(s) Oral three times a day  methylPREDNISolone sodium succinate Injectable 50 milliGRAM(s) IV Push every 12 hours  pantoprazole    Tablet 40 milliGRAM(s) Oral before breakfast  piperacillin/tazobactam IVPB.. 4.5 Gram(s) IV Intermittent every 8 hours  sodium phosphate 15 milliMole(s)/250 mL IVPB 15 milliMole(s) IV Intermittent once  trimethoprim  160 mG/sulfamethoxazole 800 mG 1 Tablet(s) Oral <User Schedule>      REVIEW OF SYSTEMS  Constitutional:  Negative fever, chills   Cardiovascular:  Negative for chest pain or palpitations.  Respiratory:  Negative for cough, wheezing, or shortness of breath.   Gastrointestinal:  Negative for nausea, vomiting, diarrhea, constipation, or abdominal pain.  Genitourinary:  Negative frequency, urgency or dysuria.  Neurologic:  No headache, confusion, dizziness    PHYSICAL EXAM  Vital Signs Last 24 Hrs  T(C): 37.9 (17 Sep 2024 13:31), Max: 37.9 (17 Sep 2024 13:31)  T(F): 100.2 (17 Sep 2024 13:31), Max: 100.2 (17 Sep 2024 13:31)  HR: 82 (17 Sep 2024 12:42) (72 - 88)  BP: 162/98 (17 Sep 2024 12:42) (125/71 - 162/98)  BP(mean): 124 (17 Sep 2024 12:42) (93 - 124)  RR: 19 (17 Sep 2024 12:42) (12 - 19)  SpO2: 95% (17 Sep 2024 12:42) (91% - 95%)    Parameters below as of 17 Sep 2024 12:42  Patient On (Oxygen Delivery Method): nasal cannula, high flow  O2 Flow (L/min): 40  O2 Concentration (%): 40  Constitutional: Awake, alert  HEENT: Normocephalic, atraumatic, BONNIE  Neck: supple, no acanthosis, no thyromegaly or palpable thyroid nodules.  Respiratory: Lungs clear to ausculation bilaterally.   Cardiovascular: regular rate and rhythm  GI: soft, non-tender, non-distended, bowel sounds present  Extremities: No lower extremity edema, peripheral pulses present.     LABS  CBC - WBC/HGB/HTC/PLT: 10.54/13.6/40.9/298 (09-17-24)  BMP: Na/K/Cl/Bicarb/BUN/Cr/Gluc: 141/4.0/110/21/48/1.36/197 (09-17-24)  Anion Gap: 10 (09-17-24)  eGFR: 68 (09-17-24)  Calcium: 8.3 (09-17-24)  Phosphorus: -- (09-17-24)  Magnesium: -- (09-17-24)  LFT - Alb/Tprot/Tbili/Dbili/AlkPhos/ALT/AST: 2.8/--/0.4/--/71/18/15 (09-17-24)  PT/aPTT/INR: 12.2/24.7/1.07 (09-14-24)  Thyroid Stimulating Hormone, Serum: 1.100 (09-17-24)    CBC - WBC/HGB/HTC/PLT: 10.54/13.6/40.9/298 (09-17-24)BMP: Na/K/Cl/Bicarb/BUN/Cr/Gluc: 141/4.0/110/21/48/1.36/197 (09-17-24)  Anion Gap: 10 (09-17-24)  eGFR: 68 (09-17-24)  Calcium: 8.3 (09-17-24)  Phosphorus: -- (09-17-24)  Magnesium: -- (09-17-24)    CAPILLARY BLOOD GLUCOSE & INSULIN RECEIVED  226 mg/dL (09-16 @ 06:30)  392 mg/dL (09-16 @ 14:22)  234 mg/dL (09-16 @ 16:29)  178 mg/dL (09-16 @ 22:23)  163 mg/dL (09-17 @ 07:54)  168 mg/dL (09-17 @ 11:32)  209 mg/dL (09-17 @ 12:47)        09-16-24 @ 07:01  -  09-17-24 @ 07:00  --------------------------------------------------------  IN: 75 mL / OUT: 900 mL / NET: -825 mL        ASSESSMENT / RECOMMENDATIONS  ********************INCOMPELTE*******************    A1C: 11.6 %  BUN: 48  Creatinine: 1.36  GFR: 68  Weight: 109.7  BMI: 42.9  EF:     # Type 2 diabetes mellitus with hyperglycemia  - Please keep lantus   units at bedtime.   - Keep lispro   units before each meal.  - Continue lispro moderate dose sliding scale before meals and at bedtime.  - Patient's fingerstick glucose goal is 100-180 mg/dL.    - Discharge recommendations to be discussed.   - Patient can follow up at discharge with [facility] by calling [phone] to make an appointment.        ********************INCOMPELTE*******************  Case discussed with Dr. Brantley. Primary team updated.     Service Pager: [phone]  HOSPITAL COURSE  BARRY MYRICK is a 38y Male with a past medical history of HTN, DM2 (uncontrolled, A1c 12), HLD, hepatitis initially presented to [facility] 9/10 ER for cough for 2 weeks, associated PERRY. Pt admitted for multifocal pneumonia c/b AHRF requiring HFNC -->BiPAP s/p intubation x2 on 9/12 and on 9/14. Extubated 9/15, now on HFNC. S/p ARDS protocol, proned 9/12-9/13. S/p CTX, azithro, and currently on Bactrim, Zosn 4.5 and Solumedrol. S/p Bronch 9/13, pending results. Course further c/b newly reduced EF 25% and cardiogenic shock s/p dobutamine and levophed gtt on 9/15. Stepped down to telemetry for management of acute hypoxic respiratory failure possibly  2/2 hypersensitivity pneumonitis vs eosinophilic pneumonia.     Endocrinology has been consulted for Diabetes Management.    DIABETES HISTORY  - Age at diagnosis: "several years ago". Reports that he has Type II but per primary team, pt also reported he had type I.  - Diabetes managed outpatient by: PCP Dr. Sabillon in [facility]  - Current Therapy: Metformin 1000mg qd but has not taken in the last three months due to running out of medications  - History of other regimens: Denies prior insulin use  - Home glucose readings: Does not check  - History of DKA/HHS: denies, never had symptoms of DKA  - Diabetic Complications: none  - Diet:  Breakfast: eats croissants and coffee; Lunch: hot food bar, rice, veggies, pasta, bread; Dinner: orders takes out. Drinks iced tea but not soda regularly      FAMILY HISTORY  - Diabetes: Materal side all with DM including mother, grandmother, aunts    SOCIAL HISTORY  - Work: [employer]  - Alcohol: drink 1 cocktail per day. Also reported drinking 12 beverages to primary team.  - Smoking: Denies      CURRENT MEDICATIONS  artificial  tears Solution 1 Drop(s) Both EYES four times a day  chlorhexidine 2% Cloths 1 Application(s) Topical <User Schedule>  dextrose 5%. 1000 milliLiter(s) IV Continuous <Continuous>  dextrose 5%. 1000 milliLiter(s) IV Continuous <Continuous>  dextrose 50% Injectable 12.5 Gram(s) IV Push once  dextrose 50% Injectable 25 Gram(s) IV Push once  dextrose 50% Injectable 25 Gram(s) IV Push once  dextrose Oral Gel 15 Gram(s) Oral once PRN  glucagon  Injectable 1 milliGRAM(s) IntraMuscular once  heparin   Injectable 7500 Unit(s) SubCutaneous every 8 hours  hydrALAZINE 10 milliGRAM(s) Oral every 8 hours  insulin glargine Injectable (LANTUS) 26 Unit(s) SubCutaneous at bedtime  insulin lispro (ADMELOG) corrective regimen sliding scale   SubCutaneous at bedtime  insulin lispro (ADMELOG) corrective regimen sliding scale   SubCutaneous three times a day before meals  insulin lispro Injectable (ADMELOG) 9 Unit(s) SubCutaneous three times a day before meals  isosorbide   dinitrate Tablet (ISORDIL) 5 milliGRAM(s) Oral three times a day  methylPREDNISolone sodium succinate Injectable 50 milliGRAM(s) IV Push every 12 hours  pantoprazole    Tablet 40 milliGRAM(s) Oral before breakfast  piperacillin/tazobactam IVPB.. 4.5 Gram(s) IV Intermittent every 8 hours  sodium phosphate 15 milliMole(s)/250 mL IVPB 15 milliMole(s) IV Intermittent once  trimethoprim  160 mG/sulfamethoxazole 800 mG 1 Tablet(s) Oral <User Schedule>      REVIEW OF SYSTEMS  Constitutional:  Negative fever, chills   Cardiovascular:  Negative for chest pain or palpitations.  Respiratory:  Negative for cough, wheezing, or shortness of breath.   Gastrointestinal:  Negative for nausea, vomiting, diarrhea, constipation, or abdominal pain.  Genitourinary:  Negative frequency, urgency or dysuria.  Neurologic:  No headache, confusion, dizziness    PHYSICAL EXAM  Vital Signs Last 24 Hrs  T(C): 37.9 (17 Sep 2024 13:31), Max: 37.9 (17 Sep 2024 13:31)  T(F): 100.2 (17 Sep 2024 13:31), Max: 100.2 (17 Sep 2024 13:31)  HR: 82 (17 Sep 2024 12:42) (72 - 88)  BP: 162/98 (17 Sep 2024 12:42) (125/71 - 162/98)  BP(mean): 124 (17 Sep 2024 12:42) (93 - 124)  RR: 19 (17 Sep 2024 12:42) (12 - 19)  SpO2: 95% (17 Sep 2024 12:42) (91% - 95%)    Parameters below as of 17 Sep 2024 12:42  Patient On (Oxygen Delivery Method): nasal cannula, high flow  O2 Flow (L/min): 40  O2 Concentration (%): 40  Constitutional: Awake, alert, obese, somewhat tired appearing  HEENT: Normocephalic, atraumatic, BONNIE  Neck: supple, no acanthosis, no thyromegaly or palpable thyroid nodules.  Respiratory: diminished breath sounds  Cardiovascular: regular rate and rhythm  GI: soft, non-tender, non-distended, bowel sounds present  Extremities: No lower extremity edema, peripheral pulses present.     LABS  CBC - WBC/HGB/HTC/PLT: 10.54/13.6/40.9/298 (09-17-24)  BMP: Na/K/Cl/Bicarb/BUN/Cr/Gluc: 141/4.0/110/21/48/1.36/197 (09-17-24)  Anion Gap: 10 (09-17-24)  eGFR: 68 (09-17-24)  Calcium: 8.3 (09-17-24)  Phosphorus: -- (09-17-24)  Magnesium: -- (09-17-24)  LFT - Alb/Tprot/Tbili/Dbili/AlkPhos/ALT/AST: 2.8/--/0.4/--/71/18/15 (09-17-24)  PT/aPTT/INR: 12.2/24.7/1.07 (09-14-24)  Thyroid Stimulating Hormone, Serum: 1.100 (09-17-24)    CBC - WBC/HGB/HTC/PLT: 10.54/13.6/40.9/298 (09-17-24)BMP: Na/K/Cl/Bicarb/BUN/Cr/Gluc: 141/4.0/110/21/48/1.36/197 (09-17-24)  Anion Gap: 10 (09-17-24)  eGFR: 68 (09-17-24)  Calcium: 8.3 (09-17-24)  Phosphorus: -- (09-17-24)  Magnesium: -- (09-17-24)    CAPILLARY BLOOD GLUCOSE & INSULIN RECEIVED  226 mg/dL (09-16 @ 06:30)  392 mg/dL (09-16 @ 14:22)  234 mg/dL (09-16 @ 16:29)  178 mg/dL (09-16 @ 22:23)  163 mg/dL (09-17 @ 07:54)  168 mg/dL (09-17 @ 11:32)  209 mg/dL (09-17 @ 12:47)        09-16-24 @ 07:01  -  09-17-24 @ 07:00  --------------------------------------------------------  IN: 75 mL / OUT: 900 mL / NET: -825 mL        ASSESSMENT / RECOMMENDATIONS  Pt is a 38-year-old male hypertension, DM2, HLD, hepatitis admitted for treatment of acute hypoxic respiratory failure 2/2 hypersensitivity pneumonitis vs eosinophilic pneumonia, course complicated by cardiogenic shock. Endocrinology consulted for DM management  ********************INCOMPELTE*******************    A1C: 11.6 %  BUN: 48  Creatinine: 1.36  GFR: 68  Weight: 109.7  BMI: 42.9  EF: 25%    # Type 2 diabetes mellitus with hyperglycemia  Currently on Solumedrol 50 BID (9/15-current). TDD insulin in the last 24h (9/15-9/16) is 54U.   - Please keep lantus   units at bedtime.   - Keep lispro   units before each meal.  - Continue lispro moderate dose sliding scale before meals and at bedtime.  - Patient's fingerstick glucose goal is 100-180 mg/dL.    - Discharge recommendations to be discussed.   - Patient can follow up at discharge with [facility] by calling [phone] to make an appointment.        ********************INCOMPELTE*******************  Case discussed with Dr. Brantley. Primary team updated.     Service Pager: [phone]  HOSPITAL COURSE  BARRY MYRICK is a 38y Male with a past medical history of HTN, DM2 (uncontrolled, A1c 12), HLD, hepatitis initially presented to [facility] 9/10 ER for cough for 2 weeks, associated PERRY. Pt admitted for multifocal pneumonia c/b AHRF requiring HFNC -->BiPAP s/p intubation x2 on 9/12 and on 9/14. Extubated 9/15, now on HFNC. S/p ARDS protocol, proned 9/12-9/13. S/p CTX, azithro, and currently on Bactrim, Zosn 4.5 and Solumedrol. S/p Bronch 9/13, pending results. Course further c/b newly reduced EF 25% and cardiogenic shock s/p dobutamine and levophed gtt on 9/15. Stepped down to telemetry for management of acute hypoxic respiratory failure possibly  2/2 hypersensitivity pneumonitis vs eosinophilic pneumonia.     Endocrinology has been consulted for Diabetes Management.    DIABETES HISTORY  - Age at diagnosis: "several years ago". Reports that he has Type II but per primary team, pt also reported he had type I.  - Diabetes managed outpatient by: PCP Dr. Sabillon in [facility]  - Current Therapy: Metformin 1000mg qd but has not taken in the last three months due to running out of medications  - History of other regimens: Denies prior insulin use  - Home glucose readings: Does not check  - History of DKA/HHS: denies, never had symptoms of DKA  - Diabetic Complications: none  - Diet:  Breakfast: eats croissants and coffee; Lunch: hot food bar, rice, veggies, pasta, bread; Dinner: orders takes out. Drinks iced tea but not soda regularly      FAMILY HISTORY  - Diabetes: Materal side all with DM including mother, grandmother, aunts    SOCIAL HISTORY  - Work: [employer]  - Alcohol: drink 1 cocktail per day. Also reported drinking 12 beverages to primary team.  - Smoking: Denies      CURRENT MEDICATIONS  artificial  tears Solution 1 Drop(s) Both EYES four times a day  chlorhexidine 2% Cloths 1 Application(s) Topical <User Schedule>  dextrose 5%. 1000 milliLiter(s) IV Continuous <Continuous>  dextrose 5%. 1000 milliLiter(s) IV Continuous <Continuous>  dextrose 50% Injectable 12.5 Gram(s) IV Push once  dextrose 50% Injectable 25 Gram(s) IV Push once  dextrose 50% Injectable 25 Gram(s) IV Push once  dextrose Oral Gel 15 Gram(s) Oral once PRN  glucagon  Injectable 1 milliGRAM(s) IntraMuscular once  heparin   Injectable 7500 Unit(s) SubCutaneous every 8 hours  hydrALAZINE 10 milliGRAM(s) Oral every 8 hours  insulin glargine Injectable (LANTUS) 26 Unit(s) SubCutaneous at bedtime  insulin lispro (ADMELOG) corrective regimen sliding scale   SubCutaneous at bedtime  insulin lispro (ADMELOG) corrective regimen sliding scale   SubCutaneous three times a day before meals  insulin lispro Injectable (ADMELOG) 9 Unit(s) SubCutaneous three times a day before meals  isosorbide   dinitrate Tablet (ISORDIL) 5 milliGRAM(s) Oral three times a day  methylPREDNISolone sodium succinate Injectable 50 milliGRAM(s) IV Push every 12 hours  pantoprazole    Tablet 40 milliGRAM(s) Oral before breakfast  piperacillin/tazobactam IVPB.. 4.5 Gram(s) IV Intermittent every 8 hours  sodium phosphate 15 milliMole(s)/250 mL IVPB 15 milliMole(s) IV Intermittent once  trimethoprim  160 mG/sulfamethoxazole 800 mG 1 Tablet(s) Oral <User Schedule>      REVIEW OF SYSTEMS  Constitutional:  Negative fever, chills   Cardiovascular:  Negative for chest pain or palpitations.  Respiratory:  Negative for cough, wheezing, or shortness of breath.   Gastrointestinal:  Negative for nausea, vomiting, diarrhea, constipation, or abdominal pain.  Genitourinary:  Negative frequency, urgency or dysuria.  Neurologic:  No headache, confusion, dizziness    PHYSICAL EXAM  Vital Signs Last 24 Hrs  T(C): 37.9 (17 Sep 2024 13:31), Max: 37.9 (17 Sep 2024 13:31)  T(F): 100.2 (17 Sep 2024 13:31), Max: 100.2 (17 Sep 2024 13:31)  HR: 82 (17 Sep 2024 12:42) (72 - 88)  BP: 162/98 (17 Sep 2024 12:42) (125/71 - 162/98)  BP(mean): 124 (17 Sep 2024 12:42) (93 - 124)  RR: 19 (17 Sep 2024 12:42) (12 - 19)  SpO2: 95% (17 Sep 2024 12:42) (91% - 95%)    Parameters below as of 17 Sep 2024 12:42  Patient On (Oxygen Delivery Method): nasal cannula, high flow  O2 Flow (L/min): 40  O2 Concentration (%): 40  Constitutional: Awake, alert, obese, somewhat tired appearing  HEENT: Normocephalic, atraumatic, BONNIE  Neck: supple, no acanthosis, no thyromegaly or palpable thyroid nodules.  Respiratory: diminished breath sounds  Cardiovascular: regular rate and rhythm  GI: soft, non-tender, non-distended, bowel sounds present  Extremities: No lower extremity edema, peripheral pulses present.     LABS  CBC - WBC/HGB/HTC/PLT: 10.54/13.6/40.9/298 (09-17-24)  BMP: Na/K/Cl/Bicarb/BUN/Cr/Gluc: 141/4.0/110/21/48/1.36/197 (09-17-24)  Anion Gap: 10 (09-17-24)  eGFR: 68 (09-17-24)  Calcium: 8.3 (09-17-24)  Phosphorus: -- (09-17-24)  Magnesium: -- (09-17-24)  LFT - Alb/Tprot/Tbili/Dbili/AlkPhos/ALT/AST: 2.8/--/0.4/--/71/18/15 (09-17-24)  PT/aPTT/INR: 12.2/24.7/1.07 (09-14-24)  Thyroid Stimulating Hormone, Serum: 1.100 (09-17-24)    CBC - WBC/HGB/HTC/PLT: 10.54/13.6/40.9/298 (09-17-24)BMP: Na/K/Cl/Bicarb/BUN/Cr/Gluc: 141/4.0/110/21/48/1.36/197 (09-17-24)  Anion Gap: 10 (09-17-24)  eGFR: 68 (09-17-24)  Calcium: 8.3 (09-17-24)  Phosphorus: -- (09-17-24)  Magnesium: -- (09-17-24)    CAPILLARY BLOOD GLUCOSE & INSULIN RECEIVED  226 mg/dL (09-16 @ 06:30)  392 mg/dL (09-16 @ 14:22)  234 mg/dL (09-16 @ 16:29)  178 mg/dL (09-16 @ 22:23)  163 mg/dL (09-17 @ 07:54)  168 mg/dL (09-17 @ 11:32)  209 mg/dL (09-17 @ 12:47)        09-16-24 @ 07:01  -  09-17-24 @ 07:00  --------------------------------------------------------  IN: 75 mL / OUT: 900 mL / NET: -825 mL        ASSESSMENT / RECOMMENDATIONS  Pt is a 38-year-old male hypertension, DM2, HLD, hepatitis admitted for treatment of acute hypoxic respiratory failure 2/2 hypersensitivity pneumonitis vs eosinophilic pneumonia, course complicated by cardiogenic shock. Endocrinology consulted for DM management  ********************INCOMPELTE*******************    A1C: 11.6 %  BUN: 48  Creatinine: 1.36  GFR: 68  Weight: 109.7  BMI: 42.9  EF: 25%    # Type 2 diabetes mellitus with hyperglycemia  Currently on Solumedrol 50 BID (9/15-current). TDD insulin in the last 24h (9/15-9/16) is 54U.   - Please keep lantus 26 units at bedtime  - Keep lispro 11 units before each meal  - Continue lispro moderate dose sliding scale before meals and at bedtime.  - Patient's fingerstick glucose goal is 100-180 mg/dL.    - Discharge recommendations to be discussed.   - Patient can follow up at discharge with [facility] by calling [phone] to make an appointment.        ********************INCOMPELTE*******************  Case discussed with Dr. Barntley. Primary team updated.     Service Pager: [phone]  HOSPITAL COURSE  BARRY MYRICK is a 38y Male with a past medical history of HTN, DM2 (uncontrolled, A1c 12), HLD, hepatitis initially presented to [facility] 9/10 ER for cough for 2 weeks, associated PERRY. Pt admitted for multifocal pneumonia c/b AHRF requiring HFNC -->BiPAP s/p intubation x2 on 9/12 and on 9/14. Extubated 9/15, now on HFNC. S/p ARDS protocol, proned 9/12-9/13. S/p CTX, azithro, and currently on Bactrim, Zosn 4.5 and Solumedrol. S/p Bronch 9/13, pending results. Course further c/b newly reduced EF 25% and cardiogenic shock s/p dobutamine and levophed gtt on 9/15. Stepped down to telemetry for management of acute hypoxic respiratory failure possibly  2/2 hypersensitivity pneumonitis vs eosinophilic pneumonia.     Endocrinology has been consulted for Diabetes Management.    DIABETES HISTORY  - Age at diagnosis: "several years ago". Reports that he has Type II but per primary team, pt also reported he had type I.  - Diabetes managed outpatient by: PCP Dr. Sabillon in [facility]  - Current Therapy: Metformin 1000mg qd but has not taken in the last three months due to running out of medications  - History of other regimens: Denies prior insulin use  - Home glucose readings: Does not check  - History of DKA/HHS: denies, never had symptoms of DKA  - Diabetic Complications: none  - Diet:  Breakfast: eats croissants and coffee; Lunch: hot food bar, rice, veggies, pasta, bread; Dinner: orders takes out. Drinks iced tea but not soda regularly      FAMILY HISTORY  - Diabetes: Materal side all with DM including mother, grandmother, aunts    SOCIAL HISTORY  - Work: [employer] store  - Alcohol: drink 1 cocktail per day. Also reported drinking 12 beverages to primary team.  - Smoking: Denies      CURRENT MEDICATIONS  artificial  tears Solution 1 Drop(s) Both EYES four times a day  chlorhexidine 2% Cloths 1 Application(s) Topical <User Schedule>  dextrose 5%. 1000 milliLiter(s) IV Continuous <Continuous>  dextrose 5%. 1000 milliLiter(s) IV Continuous <Continuous>  dextrose 50% Injectable 12.5 Gram(s) IV Push once  dextrose 50% Injectable 25 Gram(s) IV Push once  dextrose 50% Injectable 25 Gram(s) IV Push once  dextrose Oral Gel 15 Gram(s) Oral once PRN  glucagon  Injectable 1 milliGRAM(s) IntraMuscular once  heparin   Injectable 7500 Unit(s) SubCutaneous every 8 hours  hydrALAZINE 10 milliGRAM(s) Oral every 8 hours  insulin glargine Injectable (LANTUS) 26 Unit(s) SubCutaneous at bedtime  insulin lispro (ADMELOG) corrective regimen sliding scale   SubCutaneous at bedtime  insulin lispro (ADMELOG) corrective regimen sliding scale   SubCutaneous three times a day before meals  insulin lispro Injectable (ADMELOG) 9 Unit(s) SubCutaneous three times a day before meals  isosorbide   dinitrate Tablet (ISORDIL) 5 milliGRAM(s) Oral three times a day  methylPREDNISolone sodium succinate Injectable 50 milliGRAM(s) IV Push every 12 hours  pantoprazole    Tablet 40 milliGRAM(s) Oral before breakfast  piperacillin/tazobactam IVPB.. 4.5 Gram(s) IV Intermittent every 8 hours  sodium phosphate 15 milliMole(s)/250 mL IVPB 15 milliMole(s) IV Intermittent once  trimethoprim  160 mG/sulfamethoxazole 800 mG 1 Tablet(s) Oral <User Schedule>      REVIEW OF SYSTEMS  Constitutional:  Negative fever, chills   Cardiovascular:  Negative for chest pain or palpitations.  Respiratory:  Negative for cough, wheezing, or shortness of breath.   Gastrointestinal:  Negative for nausea, vomiting, diarrhea, constipation, or abdominal pain.  Genitourinary:  Negative frequency, urgency or dysuria.  Neurologic:  No headache, confusion, dizziness    PHYSICAL EXAM  Vital Signs Last 24 Hrs  T(C): 37.9 (17 Sep 2024 13:31), Max: 37.9 (17 Sep 2024 13:31)  T(F): 100.2 (17 Sep 2024 13:31), Max: 100.2 (17 Sep 2024 13:31)  HR: 82 (17 Sep 2024 12:42) (72 - 88)  BP: 162/98 (17 Sep 2024 12:42) (125/71 - 162/98)  BP(mean): 124 (17 Sep 2024 12:42) (93 - 124)  RR: 19 (17 Sep 2024 12:42) (12 - 19)  SpO2: 95% (17 Sep 2024 12:42) (91% - 95%)    Parameters below as of 17 Sep 2024 12:42  Patient On (Oxygen Delivery Method): nasal cannula, high flow  O2 Flow (L/min): 40  O2 Concentration (%): 40  Constitutional: Awake, alert, obese, somewhat tired appearing  HEENT: Normocephalic, atraumatic, BONNIE  Neck: supple, no acanthosis, no thyromegaly or palpable thyroid nodules.  Respiratory: diminished breath sounds  Cardiovascular: regular rate and rhythm  GI: soft, non-tender, non-distended, bowel sounds present  Extremities: No lower extremity edema, peripheral pulses present.     LABS  CBC - WBC/HGB/HTC/PLT: 10.54/13.6/40.9/298 (09-17-24)  BMP: Na/K/Cl/Bicarb/BUN/Cr/Gluc: 141/4.0/110/21/48/1.36/197 (09-17-24)  Anion Gap: 10 (09-17-24)  eGFR: 68 (09-17-24)  Calcium: 8.3 (09-17-24)  Phosphorus: -- (09-17-24)  Magnesium: -- (09-17-24)  LFT - Alb/Tprot/Tbili/Dbili/AlkPhos/ALT/AST: 2.8/--/0.4/--/71/18/15 (09-17-24)  PT/aPTT/INR: 12.2/24.7/1.07 (09-14-24)  Thyroid Stimulating Hormone, Serum: 1.100 (09-17-24)    CBC - WBC/HGB/HTC/PLT: 10.54/13.6/40.9/298 (09-17-24)BMP: Na/K/Cl/Bicarb/BUN/Cr/Gluc: 141/4.0/110/21/48/1.36/197 (09-17-24)  Anion Gap: 10 (09-17-24)  eGFR: 68 (09-17-24)  Calcium: 8.3 (09-17-24)  Phosphorus: -- (09-17-24)  Magnesium: -- (09-17-24)    CAPILLARY BLOOD GLUCOSE & INSULIN RECEIVED  226 mg/dL (09-16 @ 06:30)  392 mg/dL (09-16 @ 14:22)  234 mg/dL (09-16 @ 16:29)  178 mg/dL (09-16 @ 22:23)  163 mg/dL (09-17 @ 07:54)  168 mg/dL (09-17 @ 11:32)  209 mg/dL (09-17 @ 12:47)      09-16-24 @ 07:01  -  09-17-24 @ 07:00  --------------------------------------------------------  IN: 75 mL / OUT: 900 mL / NET: -825 mL      ASSESSMENT / RECOMMENDATIONS  Pt is a 38-year-old male hypertension, DM2, HLD, hepatitis admitted for treatment of acute hypoxic respiratory failure 2/2 hypersensitivity pneumonitis vs eosinophilic pneumonia, course complicated by cardiogenic shock. Endocrinology consulted for DM management    A1C: 11.6 %  BUN: 48  Creatinine: 1.36  GFR: 68  Weight: 109.7  BMI: 42.9  EF: 25%    # Type 2 diabetes mellitus with hyperglycemia  Currently on Solumedrol 50 BID (9/15-current). TDD insulin in the last 24h (9/15-9/16) is 54U.   - Please keep lantus 26 units at bedtime  - Keep lispro 11 units before each meal  - Continue lispro moderate dose sliding scale before meals and at bedtime.  - Patient's fingerstick glucose goal is 100-180 mg/dL.    - Discharge recommendations to be discussed.   - Patient can follow up at discharge with [facility] by calling [phone] to make an appointment.      Case discussed with Dr. Brantley. Primary team updated.     Service Pager: [phone]

## 2024-09-17 NOTE — CONSULT NOTE ADULT - ATTENDING COMMENTS
Patient is a 38-yo old male with Pmhx of Hypertension, DM2, HLD, Etoh use disorder, non compliant with Medical therapy, transferred from OSH, for management of acute hypoxic respiratory failure with concern for Eosinophilic pneumonitis, found to be in Shock, with ATN (resolving) and severe LV dysfunction for which cardiology was consulted     Review of Studies:  - TTE 09/16/2024: Dilated LV, EF 40% Global Hypokinesis; Mild inferior wal hypokinesis; LA borderline dilated; Mildly dilated RA  - TTE 9/12/24: Left ventricular systolic function is severely decreased with an ejection fraction of 25 % by Valle's method of disks. Global left ventricular hypokinesis. The right ventricle is not well visualized, probably normal right ventricular systolic function. The cardiac valves are not well seen except for the aortic valve which appears normal. No pericardial effusion seen.    # Systolic Heart Failure, likely Acute on Chronic  - Patient has known prior Hx of HTN and DM, previously on Lisinopril. It appears he has not taken his medications '' for a while'' after his Rx ran out  - Patient works in the [employer] and as such reports consuming Alchohol on the daily, at times tasting over a dozen drinks a day. He denies history of DTs or ETOH intoxication requiring hospitalization. He denies hx of Drug use or family Hx of Heart Failure  - In the past year he has noticed progressive worsening shortness of breath, and weight gain. When prompted further about symptoms and history patient is not entirely forthcoming or direct about his symptoms, time of onset and progression etc  - Echo reviewed from today. EF better visualized on Definity pictures. LV function is mildly reduced around 40%. The LV is dilated. There is borderline biatrial enlargement and the IVC is dilated consistent with elevated RAP.   - Clinically patient is warm, well perfused and compensated with midlly elevated JVP and 1+ pitting edema. He continues to require oxygen  - ATN is resolving with large UO. Patient auto-diuresed 4 L yesterday, UO more modest today  - At this time Etiology of Cardiomyopathy likely Non ischemic in nature.   - would diurese patient with 20 IVP Lasix x1  - Would initiate on GDMT with hydralazine 10 mg po TID and Isordil 5 mg po TID with goal to uptitrate as tolerated  - Will defer ACE/ARB/ARNI/ mraand SGLT2 at this time Given severe renal dysfunction  - Would consider Endocrine consult for A1c of 12. Unclear if Type I DM or Type II given patient reports diagnosis made '' years AGO''  - Please add on TSH, Lipid Profile, HIV to labs  - Cardiology will cont to follow with you, please call with any questions
Consultation is requested for Diabetes control  for this 38 year- old male. .He was admitted for dyspnea on exertion and cough and found to have multi-focal pneumonia.  with sepsis and the need for intubation. He is now on high Walter O2 and is getting twice daily Solumedrol. He still seems dyspneic at the bedside. Glucose level was 178 last night and [id number]. Hgb A1C is 11.6%. I agree with treating with 26 units Lantus Insulin and 11 units pre-meal Lispro Insulin.

## 2024-09-17 NOTE — PROGRESS NOTE ADULT - ASSESSMENT
38-year-old male hypertension, DM2, HLD, hepatitis presents with acute hypoxic respiratory failure c/b undifferentiated shock w/ new HFrEF    Review of Studies:  TTE 9/16/24: LVIDd 6, LV mildly dilated and mild concentric LVH w/ LVEF 40-45% w/ RWMA, RV normal size and function, LA mildly dilated, RA borderline dilated, trace AR, insufficient TR for PASP, IVC estimated RAP 8   TTE 9/12/24: Left ventricular systolic function is severely decreased with an ejection fraction of 25 % by Valle's method of disks. Global left ventricular hypokinesis. The right ventricle is not well visualized, probably normal right ventricular systolic function. The cardiac valves are not well seen except for the aortic valve which appears normal. No pericardial effusion seen.  ECG: Sinus tachycardia with left axis deviation, NSST changes     #HFrEF  Etiology: Unclear etiology at this time. Stress CM vs. myocarditis vs. ischemic vs. etoh given hx. Would defer CCTA for ischemic eval until DIANDRA improves more   GDMT: Continue hydral/isordil 10/5 mg q8 with strict holding parameters 100/60 BP. If patient tolerates today in AM, would plan to increase to hydral/isordil 25/10 for PM today 9/17.  Diuretics: Would give furosemide 20 mg IV x1 today.  - Please obtain HIV, urine protein/creatinine. TSH (1.1), iron studies (no iron deficiency noted), lipid panel (), pro-bnp (1806 9/17). A1c noted of 11.6    #DM  - Consider type 1 DM work up as well as endocrine consult to help establish care as well as discharge recs for uncontrolled DM    #HTN  Denies taking any home meds  - See GDMT above

## 2024-09-17 NOTE — PROGRESS NOTE ADULT - ASSESSMENT
38-year-old male hypertension, DM2, HLD, hepatitis admitted for treatment of acute hypoxic respiratory failure 2/2 hypersensitivity pneumonitis vs eosinophilic pneumonia, course complicated by cardiogenic shock, now of pressor support, stepped down to medicine telemetry for further management     NEURO  Extubated 9/15    CV  #cardiogenic shock - RESOLVED   While at [facility] patient was found  to have EF 25% on TTE  with global left ventricular hypokinesis requiring  levophed and dobutamine,   Tddx include ischemic cardiomyopathy vs eosinophilic myocardiosis (given high suspension for eosinophilic pneumonia after improvement with IV steroids) vs alcohol induced cardiomyopathy (given history of alcohol use).   POCUS on admission 9/14 showed moderate to severe LV dysfunction with global hypokinesis  Now off pressor support. A-line removed.   Plan  - Cardiology following, f/u recs  - Prior to initiation of GDMT, would first reassess LVEF with TTE w/ definity to see if there is improvement without the need for GDMT.   - Per Cardiology: Started on Hydral 10mg, Isosorbide 5mg  with hold paramters of .   - Goal MAP >65    #HTN  Per chart review, home lisinopril 40mg and HCTZ 25mg but has not taken for past 2 months, however unsure if patient has been taking medications at home   Now Off pressor support   Map: 96  Plan   - monitor off medications while MAP > 65       PULM  #acute hypoxic respiratory failure  Possibly 2/2 hypersensitivity pneumonitis vs eosinophilic pneumonia  Patient  initially presented to [facility] on 9/10 for SOB, PERRY, cough x 2 weeks. Found to have increasing O2 requirements initially placed on NC > HFNC > BiPAP, eventually requiring intubation, was proned 9/12-9/13 and paralyzed per ARDS protocol  CT- chest significant for Multi- focal pneumonia.   Of  note patient was treated with steroids, was noted to improve rapidly after treatment, which was highly concerning for hypersensitivity pneumonitis vs eosinophilic pneumonia  Patient was treated with CTX 9/10-9/14, Azithro 9/10-9/12, Bactrim 9/11-9/12, iv solumedrol 9/11-9/14 at OSH. Noted improvement upon admission to [facility] ICU s/p solumedrol, making hypersensitivity pneumonitis vs eosinophilic pneumonia the leading differential given treatment is the same.   s/p Bronchoscopy 9/13.   Repeat CT- chest with improved bilateral infiltrates  Plan  - f/u Aldolase, ANCA, RAMAKRISHNA, aspergillus, blood parasite, coccidioides antibodies, Anti-ccp, FENG, dsDNA, fungitell, histoplasma antigen, hypersensitivity  pneumonitis panel, IgE , Anti geoffrey-1, Myomarker panel ,RF, scleroderma antibodies, sjogren' s antibodies, stool parasite, strongyloides antibodies, toxocara antibodies. RVP.  - continue zosyn 4.5 g q 12 end date 9/19  -c/w  methylprednisolone 50mg IV BID  - Needs pulmonology outpatient follow up in [place]  - On discharge, needs outpatient Bactrim DS MWF for PCP prophylaxis (long-term steroid use - prednisone 60mg)    RENAL  #DIANDRA  Hess exchanged 9/14  At OSH noted to have increase in BUN and Cr with concern for anuria, making urine at H   Suspect prerenal in setting of cardiogenic shock with possibly septic shock   Cr. 3.49 ----> 2.70  Urine Lytes 9/15: Na<20, Cr:120, Urea: 647, Osm: 396   Plan  - strict is/os    GI  Extubated, carb consistent diet      ENDO  #DM  Poorly controlled   A1c 12% during [place] Admission   Per chart review, Home metformin 1000mg BID but has not taken for past 2 months  - discontinue insulin gtt, no indication for DKA  - c/w hISS  - c/w basal insulin (20U Lantus bedtime)  - FSG q4hrs    #HLD  Per chart review, on a statin (unknown which one) but not taking past 2 months.  - holding home meds    ID  HATTIE    HEME/ONC  #DVT prophylaxis   - heparin subq  - maintain active T&S  - transfuse if Hgb <7    PREVENTIVE   F: as needed  E: Replete if K<4, Mg<2  N: NPO  GI: PPI  DVT: heparin subq  DISPO: 7Lach   38-year-old male hypertension, DM2, HLD, hepatitis admitted for treatment of acute hypoxic respiratory failure 2/2 hypersensitivity pneumonitis vs eosinophilic pneumonia, course complicated by cardiogenic shock, now of pressor support, stepped down to medicine telemetry for further management     NEURO  Extubated 9/15    CV  #cardiogenic shock - RESOLVED   While at [facility] patient was found  to have EF 25% on TTE  with global left ventricular hypokinesis requiring  levophed and dobutamine,   Tddx include ischemic cardiomyopathy vs eosinophilic myocardiosis (given high suspension for eosinophilic pneumonia after improvement with IV steroids) vs alcohol induced cardiomyopathy (given history of alcohol use).   POCUS on admission 9/14 showed moderate to severe LV dysfunction with global hypokinesis  Now off pressor support. A-line removed.   Plan  - Cardiology following, f/u recs  - Repat TTE on 9/16, 40-45%, improved reported TTE at [place] 25%.   - Per Cardiology: Started on Hydral 10mg, Isosorbide 5mg  with hold paramters of .   - Goal MAP >65    #HTN  Per chart review, home lisinopril 40mg and HCTZ 25mg but has not taken for past 2 months, however unsure if patient has been taking medications at home   Now Off pressor support   Map: 96  Plan   - monitor off medications while MAP > 65       PULM  #acute hypoxic respiratory failure  Possibly 2/2 hypersensitivity pneumonitis vs eosinophilic pneumonia  Patient  initially presented to [facility] on 9/10 for SOB, PERRY, cough x 2 weeks. Found to have increasing O2 requirements initially placed on NC > HFNC > BiPAP, eventually requiring intubation, was proned 9/12-9/13 and paralyzed per ARDS protocol  CT- chest significant for Multi- focal pneumonia.   Of  note patient was treated with steroids, was noted to improve rapidly after treatment, which was highly concerning for hypersensitivity pneumonitis vs eosinophilic pneumonia  Patient was treated with CTX 9/10-9/14, Azithro 9/10-9/12, Bactrim 9/11-9/12, iv solumedrol 9/11-9/14 at OSH. Noted improvement upon admission to [facility] ICU s/p solumedrol, making hypersensitivity pneumonitis vs eosinophilic pneumonia the leading differential given treatment is the same.   s/p Bronchoscopy 9/13.   Repeat CT- chest with improved bilateral infiltrates  Plan  - f/u Aldolase, ANCA, RAMAKRISHNA, aspergillus, blood parasite, coccidioides antibodies, Anti-ccp, FENG, dsDNA, fungitell, histoplasma antigen, hypersensitivity  pneumonitis panel, IgE , Anti geoffrey-1, Myomarker panel ,RF, scleroderma antibodies, sjogren' s antibodies, stool parasite, strongyloides antibodies, toxocara antibodies. RVP.  - continue zosyn 4.5 g q 12 end date 9/19  -c/w  methylprednisolone 50mg IV BID  - Needs pulmonology outpatient follow up in [place]  - On discharge, needs outpatient Bactrim DS MWF for PCP prophylaxis (long-term steroid use - prednisone 60mg)    RENAL  #DIANDRA  Hess exchanged 9/14  At OSH noted to have increase in BUN and Cr with concern for anuria, making urine at H   Suspect prerenal in setting of cardiogenic shock with possibly septic shock   Cr. 3.49 ----> 2.70  Urine Lytes 9/15: Na<20, Cr:120, Urea: 647, Osm: 396   Plan  - strict is/os    GI  Extubated, carb consistent diet      ENDO  #DM  Poorly controlled   A1c 12% during [facility] Admission   Per chart review, Home metformin 1000mg BID but has not taken for past 2 months  - discontinue insulin gtt, no indication for DKA  - c/w hISS  - c/w basal insulin (20U Lantus bedtime)  - FSG q4hrs    #HLD  Per chart review, on a statin (unknown which one) but not taking past 2 months.  - holding home meds    ID  HATTIE    HEME/ONC  #DVT prophylaxis   - heparin subq  - maintain active T&S  - transfuse if Hgb <7    PREVENTIVE   F: as needed  E: Replete if K<4, Mg<2  N: NPO  GI: PPI  DVT: heparin subq  DISPO: 7Lach

## 2024-09-17 NOTE — PROGRESS NOTE ADULT - ATTENDING COMMENTS
Patient is a 38-yo old male with Pmhx of Hypertension, DM2, HLD, Etoh use disorder, non compliant with Medical therapy, transferred from OSH, for management of acute hypoxic respiratory failure with concern for Eosinophilic pneumonitis, found to be in Shock, with ATN (resolving) and severe LV dysfunction for which cardiology was consulted     Review of Studies:  - TTE 09/16/2024: Dilated LV, EF 40% Global Hypokinesis; Mild inferior wal hypokinesis; LA borderline dilated; Mildly dilated RA  - TTE 9/12/24: Left ventricular systolic function is severely decreased with an ejection fraction of 25 % by Valle's method of disks. Global left ventricular hypokinesis. The right ventricle is not well visualized, probably normal right ventricular systolic function. The cardiac valves are not well seen except for the aortic valve which appears normal. No pericardial effusion seen.    # Systolic Heart Failure, likely Acute on Chronic  - Patient has known prior Hx of HTN and DM, previously on Lisinopril. 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 He denies history of DTs or ETOH intoxication requiring hospitalization. He denies hx of Drug use or family Hx of Heart Failure  - In the past year he has noticed progressive worsening shortness of breath, and weight gain. When prompted further about symptoms and history patient is not entirely forthcoming or direct about his symptoms, time of onset and progression etc  - Echo reviewed. EF better visualized on Definity pictures. LV function is mildly reduced around 40%. The LV is dilated. There is borderline biatrial enlargement and the IVC is dilated consistent with elevated RAP.   - Clinically patient is warm, well perfused and compensated. He continues to require oxygen  - ATN is resolving with continued improvement in UP  - At this time Etiology of Cardiomyopathy likely Non ischemic in nature.   - Would diurese patient with 20 IVP Lasix x1  - Would uptitrate GDMT with hydralazine 25 mg po TID and Isordil 10 mg po TID with goal to uptitrate as tolerated  - Will defer ACE/ARB/ARNI/ mraand SGLT2 at this time Given severe renal dysfunction. Please send Rx to [facility] for Entresto 24-26 mg po BID. If covered, once renal function has stabilizes and GFR stable will intiate Entresto  - Anticipate will start BB therapy once ACE/ARB/ARNI initiated  - Would consider Endocrine consult for A1c of 12.   - TSH WNL. Patient with Marked Hypertriglyceridemia and HLD with LDL of 120. Given concurrent DM, would initiate Lipitor 40 mg po Qd  - Will consider ischemic evaluation via CCTA once renal function normalizes. Low overall suspicion for CAD as HF culprit.  - Once patient more alert and able to participate more in History will discuss family history and role [facility]  - Cardiology will cont to follow with you, please call with any questions .

## 2024-09-17 NOTE — CONSULT NOTE ADULT - ASSESSMENT
38M PMH HTN, DM, HLD, hepatitis initially presented to [facility] with SOB and cough transferred from [facility] for AHRF and intubated to [facility] MICU and downgraded to tele floor. Pulmonology consulted for AHRF 2/2 eosinophilic pna vs hypersensitivity pneumonitis vs     At [place], he was started on a course of antibiotics at [facility] with an initial ddx of severe CAP vs  vs PJP. Hospital course prior to transfer was complicated by newly reduced EF of 25% and cardiogenic shock requiring dobutamine and levophed, now weaned off. Bronchoscopy was performed on 9/13 with pending results. HIV was negative.     9/10-9/14 treated with CTX  9/10-9/12 treated with azithromycin   9/11-9/12 treated with bactrim  9/11-9/14 treated with solumedrol  9/13 started on zosyn     Upon admission to MICU, POCUS on 9/14 showed moderate to severe LV dysfunction with global hypokinesis        Data  38M PMH HTN, DM, HLD, hepatitis initially presented to [facility] with SOB and cough transferred from [facility] for AHRF and intubated to [facility] MICU and downgraded to [facility] floor. Pulmonology consulted for AHRF 2/2 eosinophilic pna vs hypersensitivity pneumonitis vs     At [place], he was started on a course of antibiotics at [facility] with an initial ddx of severe CAP vs  vs PJP. Hospital course prior to transfer was complicated by newly reduced EF of 25% and cardiogenic shock requiring dobutamine and levophed, now weaned off. Bronchoscopy was performed on 9/13 with no BAL, cultures NGTD. HIV was negative.     9/10-9/14 treated with CTX  9/10-9/12 treated with azithromycin   9/11-9/12 treated with bactrim  9/11-9/14 treated with solumedrol  9/13 started on zosyn     Data Review:  CT chest: Extensive bilateral patchy and consolidative opacities decreased in the upper lobes and increased in the lower lobes compared to prior.   TTE:  EF 40% with mildly reduced LV systolic function, mild dilated Lv and mild symmetric LV hypertrophy  procal elevated to 4.4  Fungitell, c-ANCA, p-ANCA, Atypical ANCA, RF, CCP, DS DNA, Anti-López Ab, Anti-Ribonuclear Protein, Anti Solange-1, Scleroderma Ab, Anti SS-A Ab, Anti SS-B Ab, ANCA Reflex MPO and PROT3 negative       Upon admission to MICU, POCUS on 9/14 showed moderate to severe LV dysfunction with global hypokinesis. Repeat TTE showed EF 40% with mildly reduced LV systolic function, mild dilated Lv and mild symmetric LV hypertrophy. He was extubated on 9/15 to [facility].   With sudden decompensation, AHRF etiology is unclear with severe PNA and ARDS. Newly reduced EF with cardiogenic shock suggesting flash pulmonary edema as possible cause of his clinical picture vs rapid improvement with steroids also supports eosinophilic PNA as a differential dx. With no new exposures as per the patient, hypersensitivity pneumonitis. As he is clinically improving with decreasing FiO2 requirements, would hold off on bronchoscopy for now and continue current management.     Recommendations  --continue zosyn for 3 more days for total of 7 days   --continue solumedrol 1mg/kg, will eventually need to taper  --continue bactrim for PCP ppx   --continue to diurese to keep net even   --wean O2 as tolerated 38M PMH HTN, DM, HLD, hepatitis initially presented to [facility] with SOB and cough transferred from [facility] for AHRF and intubated to [facility] MICU and downgraded to [facility] floor. Pulmonology consulted for AHRF 2/2 severe CAP vs flash pulmonary edema vs eosinophilic pneumonia    At [place], he was started on a course of antibiotics at [facility] with an initial ddx of severe CAP vs  vs PJP. Hospital course prior to transfer was complicated by newly reduced EF of 25% and cardiogenic shock requiring dobutamine and levophed, now weaned off. Bronchoscopy was performed on 9/13 with no BAL, cultures NGTD. HIV was negative.     9/10-9/14 treated with CTX  9/10-9/12 treated with azithromycin   9/11-9/12 treated with bactrim  9/11-9/14 treated with solumedrol  9/13 started on zosyn     Data Review:  CT chest: Extensive bilateral patchy and consolidative opacities decreased in the upper lobes and increased in the lower lobes compared to prior.   TTE:  EF 40% with mildly reduced LV systolic function, mild dilated Lv and mild symmetric LV hypertrophy  procal elevated to 4.4  Fungitell, c-ANCA, p-ANCA, Atypical ANCA, RF, CCP, DS DNA, Anti-López Ab, Anti-Ribonuclear Protein, Anti Solange-1, Scleroderma Ab, Anti SS-A Ab, Anti SS-B Ab, ANCA Reflex MPO and PROT3 negative       Upon admission to MICU, POCUS on 9/14 showed moderate to severe LV dysfunction with global hypokinesis. Repeat TTE showed EF 40% with mildly reduced LV systolic function, mild dilated Lv and mild symmetric LV hypertrophy. He was extubated on 9/15 to [facility].   With sudden decompensation, AHRF etiology is unclear with severe PNA and ARDS. Newly reduced EF with cardiogenic shock at [place] suggesting flash pulmonary edema as possible cause of his clinical picture vs rapid improvement with steroids also supports eosinophilic PNA as a differential dx, however unable to confirm with no recent BAL prior to steroids. Given no new exposures as per the patient, hypersensitivity pneumonitis. As he is clinically improving with decreasing FiO2 requirements, would hold off on bronchoscopy for now and continue current management.     Recommendations  --continue zosyn for 3 more days for total of 7 days   --continue solumedrol 1mg/kg, will eventually need to taper  --continue bactrim for PCP ppx   --continue to diurese to keep net even   --wean O2 as tolerated 38M PMH HTN, DM, HLD, hepatitis initially presented to [facility] with SOB and cough transferred from [facility] for AHRF and intubated to [facility] MICU and downgraded to [facility] floor. Pulmonology consulted for AHRF 2/2 severe CAP vs flash pulmonary edema vs eosinophilic pneumonia    At [place], he was started on a course of antibiotics at [facility] with an initial ddx of severe CAP vs  vs PJP. Hospital course prior to transfer was complicated by newly reduced EF of 25% and cardiogenic shock requiring dobutamine and levophed, now weaned off. Bronchoscopy was performed on 9/13 with no BAL, cultures NGTD. HIV was negative.     9/10-9/14 treated with CTX  9/10-9/12 treated with azithromycin   9/11-9/12 treated with bactrim  9/11-9/14 treated with solumedrol  9/13 started on zosyn     Data Review:  CT chest 9/14: Extensive bilateral patchy and consolidative opacities decreased in the upper lobes and increased in the lower lobes compared to prior.   TTE:  EF 40% with mildly reduced LV systolic function, mild dilated Lv and mild symmetric LV hypertrophy  procal elevated to 4.4  Fungitell, c-ANCA, p-ANCA, Atypical ANCA, RF, CCP, DS DNA, Anti-López Ab, Anti-Ribonuclear Protein, Anti Solange-1, Scleroderma Ab, Anti SS-A Ab, Anti SS-B Ab, ANCA Reflex MPO and PROT3 negative       Upon admission to MICU, POCUS on 9/14 showed moderate to severe LV dysfunction with global hypokinesis. Repeat TTE showed EF 40% with mildly reduced LV systolic function, mild dilated Lv and mild symmetric LV hypertrophy. He was extubated on 9/15 to [facility].   With sudden decompensation, AHRF etiology is unclear with severe PNA and ARDS. Newly reduced EF with cardiogenic shock at [place] suggesting flash pulmonary edema as possible cause of his clinical picture vs rapid improvement with steroids also supports eosinophilic PNA as a differential dx, however unable to confirm with no recent BAL prior to steroids. Given no new exposures as per the patient, hypersensitivity pneumonitis. As he is clinically improving with decreasing FiO2 requirements, would hold off on bronchoscopy for now and continue current management.     Recommendations  --continue zosyn for 3 more days for total of 7 days   --continue solumedrol 1mg/kg, will eventually need to taper  --continue bactrim for PCP ppx   --continue to diurese to keep net even   --wean O2 as tolerated

## 2024-09-17 NOTE — SBIRT NOTE ADULT - NSSBIRTBRIEFINTDET_GEN_A_CORE
Patient reports alcohol use 2x/week, will have approx. 3 drinks (old fashioned cocktails). Patient did not endorse SA. SW provided support and offered resources however patient declined, states he "has his alcohol use under control."

## 2024-09-17 NOTE — PROGRESS NOTE ADULT - SUBJECTIVE AND OBJECTIVE BOX
***Note in progress***    Hospital Course:   38yoM with a PMHx of HTN, DM2 (uncontrolled, A1c 12), HLD, hepatitis initially presented to [facility] 9/10 ER for cough for 2 weeks. Reported cough productive of yellow sputum, runny nose, subjective fevers as well and PERRY. [facility], tested him for COVID (negative), and sent him to the ER. Patient bought a home pulse oximeter that ranged from 89 to 93% this week. In the ED, the patient was found to have a multifocal pneumonia and started on treatment for CAP. He was initially on 3LNC on 9/10 but on 9/11 O2 req increased > HFNC. CT Chest 9/11 extensive multifocal consolidations > transferred to ICU > BiPAP > intubated 9/12. Treated with ARDS protocol, paralyzed, proned 9/12-9/13. ddx severe CAP vs  vs PJP. Treated with CTX 9/10-9/14, azithro 9/10-9/12, bactrim 9/11-9/12, iv solumedrol 9/11-9/14. Zosyn 4.5g started 9/13. Bronch 9/13, pending results. HIV neg. Reintubated 9/14.  decreased UOP. Course further c/b newly reduced EF 25% and cardiogenic shock req dobutamine. Admitted to MICU for further management. POCUS on admission 9/14: moderate to severe LV dysfunction with global hypokinesis. dobutamine discontinued 9/15. Pending formal AMMON. Stepped down to telemetry for management of acute hypoxic respiratory failure possibly  2/2 hypersensitivity pneumonitis vs eosinophilic pneumonia.     OVERNIGHT EVENTS: placed on HFNC .     SUBJECTIVE / INTERVAL HPI: Patient seen and examined at bedside. Patient denying chest pain, SOB, palpitations, cough. Patient denies fever, chills, HA, Dizziness, N/V, abdominal pain, diarrhea, constipation, hematochezia/melena, dysuria, hematuria, new onset weakness/numbness, LE pain and/or swelling.    Remaining ROS negative       PHYSICAL EXAM:  GENERAL: Awake, alert and interactive, no acute distress, appears comfortable  NEURO: A&Ox4, no focal deficits  HEENT: Normocephalic, atraumatic  NECK: Supple, no LAD  CARDIAC: Regular rate and rhythm, +S1/S2  PULM: Breathing comfortably on HFNC, diminished lung sounds  ABDOMEN: Soft, nontender, nondistended  MSK: Range of motion grossly intact, no back tenderness  SKIN: Warm and dry, no rashes, lesions, left arm with bandage at forearm from prior A-line, no warm, erythema or purulent drainage from wound, additional bandage noted on left upper chest below collarbone from prior central line, no erythema or pus noted    VASC: Cap refill < 2 sec, 2+ peripheral pulses, no edema, no LE tenderness      VITAL SIGNS:  Vital Signs Last 24 Hrs  T(C): 36.9 (17 Sep 2024 06:43), Max: 37.3 (16 Sep 2024 13:36)  T(F): 98.4 (17 Sep 2024 06:43), Max: 99.1 (16 Sep 2024 13:36)  HR: 88 (17 Sep 2024 05:48) (72 - 89)  BP: 161/90 (17 Sep 2024 04:13) (125/71 - 161/90)  BP(mean): 120 (17 Sep 2024 04:13) (92 - 121)  RR: 14 (17 Sep 2024 05:48) (12 - 18)  SpO2: 92% (17 Sep 2024 05:48) (89% - 95%)    Parameters below as of 17 Sep 2024 05:48  Patient On (Oxygen Delivery Method): nasal cannula, high flow  O2 Flow (L/min): 40  O2 Concentration (%): 40    MEDICATIONS:  MEDICATIONS  (STANDING):  artificial  tears Solution 1 Drop(s) Both EYES four times a day  chlorhexidine 2% Cloths 1 Application(s) Topical <User Schedule>  dextrose 5%. 1000 milliLiter(s) (100 mL/Hr) IV Continuous <Continuous>  dextrose 5%. 1000 milliLiter(s) (50 mL/Hr) IV Continuous <Continuous>  dextrose 50% Injectable 12.5 Gram(s) IV Push once  dextrose 50% Injectable 25 Gram(s) IV Push once  dextrose 50% Injectable 25 Gram(s) IV Push once  glucagon  Injectable 1 milliGRAM(s) IntraMuscular once  heparin   Injectable 7500 Unit(s) SubCutaneous every 8 hours  hydrALAZINE 10 milliGRAM(s) Oral every 8 hours  insulin glargine Injectable (LANTUS) 26 Unit(s) SubCutaneous at bedtime  insulin lispro (ADMELOG) corrective regimen sliding scale   SubCutaneous three times a day before meals  insulin lispro (ADMELOG) corrective regimen sliding scale   SubCutaneous at bedtime  insulin lispro Injectable (ADMELOG) 9 Unit(s) SubCutaneous three times a day before meals  isosorbide   dinitrate Tablet (ISORDIL) 5 milliGRAM(s) Oral three times a day  methylPREDNISolone sodium succinate Injectable 50 milliGRAM(s) IV Push every 12 hours  pantoprazole    Tablet 40 milliGRAM(s) Oral before breakfast  piperacillin/tazobactam IVPB.. 4.5 Gram(s) IV Intermittent every 8 hours  trimethoprim  160 mG/sulfamethoxazole 800 mG 1 Tablet(s) Oral <User Schedule>    MEDICATIONS  (PRN):  dextrose Oral Gel 15 Gram(s) Oral once PRN Blood Glucose LESS THAN 70 milliGRAM(s)/deciliter      ALLERGIES:  Allergies    [place] (Stomach Upset)  [place] (Stomach Upset)  No Known Drug Allergies    Intolerances        LABS:                        11.9   8.40  )-----------( 312      ( 16 Sep 2024 02:42 )             36.5     09-16    137  |  105  |  72<H>  ----------------------------<  284<H>  4.3   |  21<L>  |  2.70<H>    Ca    8.4      16 Sep 2024 02:39  Phos  6.2     09-16  Mg     2.6     09-16        Urinalysis Basic - ( 16 Sep 2024 02:39 )    Color: x / Appearance: x / SG: x / pH: x  Gluc: 284 mg/dL / Ketone: x  / Bili: x / Urobili: x   Blood: x / Protein: x / Nitrite: x   Leuk Esterase: x / RBC: x / WBC x   Sq Epi: x / Non Sq Epi: x / Bacteria: x      CAPILLARY BLOOD GLUCOSE      POCT Blood Glucose.: 178 mg/dL (16 Sep 2024 22:23)      RADIOLOGY & ADDITIONAL TESTS: Reviewed. Hospital Course:   38yoM with a PMHx of HTN, DM2 (uncontrolled, A1c 12), HLD, hepatitis initially presented to [facility] 9/10 ER for cough for 2 weeks. Reported cough productive of yellow sputum, runny nose, subjective fevers as well and PERRY. [facility], tested him for COVID (negative), and sent him to the ER. Patient bought a home pulse oximeter that ranged from 89 to 93% this week. In the ED, the patient was found to have a multifocal pneumonia and started on treatment for CAP. He was initially on 3LNC on 9/10 but on 9/11 O2 req increased > HFNC. CT Chest 9/11 extensive multifocal consolidations > transferred to ICU > BiPAP > intubated 9/12. Treated with ARDS protocol, paralyzed, proned 9/12-9/13. ddx severe CAP vs  vs PJP. Treated with CTX 9/10-9/14, azithro 9/10-9/12, bactrim 9/11-9/12, iv solumedrol 9/11-9/14. Zosyn 4.5g started 9/13. Bronch 9/13, pending results. HIV neg. Reintubated 9/14.  decreased UOP. Course further c/b newly reduced EF 25% and cardiogenic shock req dobutamine. Admitted to MICU for further management. POCUS on admission 9/14: moderate to severe LV dysfunction with global hypokinesis. dobutamine discontinued 9/15. Pending formal AMMON. Stepped down to telemetry for management of acute hypoxic respiratory failure possibly  2/2 hypersensitivity pneumonitis vs eosinophilic pneumonia.     OVERNIGHT EVENTS: placed on HFNC .     SUBJECTIVE / INTERVAL HPI: Patient seen and examined at bedside. Patient denying chest pain, SOB, palpitations, cough. Patient denies fever, chills, HA, Dizziness, N/V, abdominal pain, diarrhea, constipation, hematochezia/melena, dysuria, hematuria, new onset weakness/numbness, LE pain and/or swelling.    Remaining ROS negative       PHYSICAL EXAM:  GENERAL: Awake, alert and interactive, no acute distress, appears comfortable  NEURO: A&Ox4, no focal deficits  HEENT: Normocephalic, atraumatic  NECK: Supple, no LAD  CARDIAC: Regular rate and rhythm, +S1/S2  PULM: Breathing comfortably on HFNC, diminished lung sounds  ABDOMEN: Soft, nontender, nondistended  MSK: Range of motion grossly intact, no back tenderness  SKIN: Warm and dry, no rashes, lesions, left arm with bandage at forearm from prior A-line, no warm, erythema or purulent drainage from wound, additional bandage noted on left upper chest below collarbone from prior central line, no erythema or pus noted    VASC: Cap refill < 2 sec, 2+ peripheral pulses, no edema, no LE tenderness      VITAL SIGNS:  Vital Signs Last 24 Hrs  T(C): 36.9 (17 Sep 2024 06:43), Max: 37.3 (16 Sep 2024 13:36)  T(F): 98.4 (17 Sep 2024 06:43), Max: 99.1 (16 Sep 2024 13:36)  HR: 88 (17 Sep 2024 05:48) (72 - 89)  BP: 161/90 (17 Sep 2024 04:13) (125/71 - 161/90)  BP(mean): 120 (17 Sep 2024 04:13) (92 - 121)  RR: 14 (17 Sep 2024 05:48) (12 - 18)  SpO2: 92% (17 Sep 2024 05:48) (89% - 95%)    Parameters below as of 17 Sep 2024 05:48  Patient On (Oxygen Delivery Method): nasal cannula, high flow  O2 Flow (L/min): 40  O2 Concentration (%): 40    MEDICATIONS:  MEDICATIONS  (STANDING):  artificial  tears Solution 1 Drop(s) Both EYES four times a day  chlorhexidine 2% Cloths 1 Application(s) Topical <User Schedule>  dextrose 5%. 1000 milliLiter(s) (100 mL/Hr) IV Continuous <Continuous>  dextrose 5%. 1000 milliLiter(s) (50 mL/Hr) IV Continuous <Continuous>  dextrose 50% Injectable 12.5 Gram(s) IV Push once  dextrose 50% Injectable 25 Gram(s) IV Push once  dextrose 50% Injectable 25 Gram(s) IV Push once  glucagon  Injectable 1 milliGRAM(s) IntraMuscular once  heparin   Injectable 7500 Unit(s) SubCutaneous every 8 hours  hydrALAZINE 10 milliGRAM(s) Oral every 8 hours  insulin glargine Injectable (LANTUS) 26 Unit(s) SubCutaneous at bedtime  insulin lispro (ADMELOG) corrective regimen sliding scale   SubCutaneous three times a day before meals  insulin lispro (ADMELOG) corrective regimen sliding scale   SubCutaneous at bedtime  insulin lispro Injectable (ADMELOG) 9 Unit(s) SubCutaneous three times a day before meals  isosorbide   dinitrate Tablet (ISORDIL) 5 milliGRAM(s) Oral three times a day  methylPREDNISolone sodium succinate Injectable 50 milliGRAM(s) IV Push every 12 hours  pantoprazole    Tablet 40 milliGRAM(s) Oral before breakfast  piperacillin/tazobactam IVPB.. 4.5 Gram(s) IV Intermittent every 8 hours  trimethoprim  160 mG/sulfamethoxazole 800 mG 1 Tablet(s) Oral <User Schedule>    MEDICATIONS  (PRN):  dextrose Oral Gel 15 Gram(s) Oral once PRN Blood Glucose LESS THAN 70 milliGRAM(s)/deciliter      ALLERGIES:  Allergies    [place] (Stomach Upset)  [place] (Stomach Upset)  No Known Drug Allergies    Intolerances        LABS:                        11.9   8.40  )-----------( 312      ( 16 Sep 2024 02:42 )             36.5     09-16    137  |  105  |  72<H>  ----------------------------<  284<H>  4.3   |  21<L>  |  2.70<H>    Ca    8.4      16 Sep 2024 02:39  Phos  6.2     09-16  Mg     2.6     09-16        Urinalysis Basic - ( 16 Sep 2024 02:39 )    Color: x / Appearance: x / SG: x / pH: x  Gluc: 284 mg/dL / Ketone: x  / Bili: x / Urobili: x   Blood: x / Protein: x / Nitrite: x   Leuk Esterase: x / RBC: x / WBC x   Sq Epi: x / Non Sq Epi: x / Bacteria: x      CAPILLARY BLOOD GLUCOSE      POCT Blood Glucose.: 178 mg/dL (16 Sep 2024 22:23)      RADIOLOGY & ADDITIONAL TESTS: Reviewed.

## 2024-09-18 LAB
ALBUMIN SERPL ELPH-MCNC: 2.7 G/DL — LOW (ref 3.3–5)
ALP SERPL-CCNC: 70 U/L — SIGNIFICANT CHANGE UP (ref 40–120)
ALT FLD-CCNC: 18 U/L — SIGNIFICANT CHANGE UP (ref 10–45)
ANA TITR SER: NEGATIVE — SIGNIFICANT CHANGE UP
ANION GAP SERPL CALC-SCNC: 9 MMOL/L — SIGNIFICANT CHANGE UP (ref 5–17)
AST SERPL-CCNC: 12 U/L — SIGNIFICANT CHANGE UP (ref 10–40)
BASOPHILS # BLD AUTO: 0.02 K/UL — SIGNIFICANT CHANGE UP (ref 0–0.2)
BASOPHILS NFR BLD AUTO: 0.2 % — SIGNIFICANT CHANGE UP (ref 0–2)
BILIRUB SERPL-MCNC: 0.6 MG/DL — SIGNIFICANT CHANGE UP (ref 0.2–1.2)
BUN SERPL-MCNC: 37 MG/DL — HIGH (ref 7–23)
CALCIUM SERPL-MCNC: 8.2 MG/DL — LOW (ref 8.4–10.5)
CHLORIDE SERPL-SCNC: 106 MMOL/L — SIGNIFICANT CHANGE UP (ref 96–108)
CK MB BLD-MCNC: HIGH TITER
CO2 SERPL-SCNC: 24 MMOL/L — SIGNIFICANT CHANGE UP (ref 22–31)
COXSACKIE TYPE A-24: HIGH TITER
CREAT SERPL-MCNC: 1.17 MG/DL — SIGNIFICANT CHANGE UP (ref 0.5–1.3)
CV A24 IGG TITR SER IF: HIGH TITER
CV A7 AB SER-ACNC: HIGH TITER
CV A9 AB TITR FLD: HIGH TITER
CV B1 AB TITR FLD: HIGH
CV B2 AB TITR FLD: HIGH
CV B3 AB TITR FLD: HIGH
CV B4 AB TITR FLD: HIGH
CV B5 AB TITR FLD: HIGH
CV B6 AB TITR FLD: HIGH
EGFR: 82 ML/MIN/1.73M2 — SIGNIFICANT CHANGE UP
EOSINOPHIL # BLD AUTO: 0.02 K/UL — SIGNIFICANT CHANGE UP (ref 0–0.5)
EOSINOPHIL NFR BLD AUTO: 0.2 % — SIGNIFICANT CHANGE UP (ref 0–6)
GLUCOSE BLDC GLUCOMTR-MCNC: 142 MG/DL — HIGH (ref 70–99)
GLUCOSE BLDC GLUCOMTR-MCNC: 153 MG/DL — HIGH (ref 70–99)
GLUCOSE BLDC GLUCOMTR-MCNC: 164 MG/DL — HIGH (ref 70–99)
GLUCOSE BLDC GLUCOMTR-MCNC: 180 MG/DL — HIGH (ref 70–99)
GLUCOSE BLDC GLUCOMTR-MCNC: 227 MG/DL — HIGH (ref 70–99)
GLUCOSE SERPL-MCNC: 172 MG/DL — HIGH (ref 70–99)
HCT VFR BLD CALC: 40 % — SIGNIFICANT CHANGE UP (ref 39–50)
HGB BLD-MCNC: 13.3 G/DL — SIGNIFICANT CHANGE UP (ref 13–17)
HIV 1+2 AB+HIV1 P24 AG SERPL QL IA: SIGNIFICANT CHANGE UP
IMM GRANULOCYTES NFR BLD AUTO: 1.4 % — HIGH (ref 0–0.9)
LYMPHOCYTES # BLD AUTO: 1.31 K/UL — SIGNIFICANT CHANGE UP (ref 1–3.3)
LYMPHOCYTES # BLD AUTO: 13.9 % — SIGNIFICANT CHANGE UP (ref 13–44)
MAGNESIUM SERPL-MCNC: 1.8 MG/DL — SIGNIFICANT CHANGE UP (ref 1.6–2.6)
MCHC RBC-ENTMCNC: 27.4 PG — SIGNIFICANT CHANGE UP (ref 27–34)
MCHC RBC-ENTMCNC: 33.3 GM/DL — SIGNIFICANT CHANGE UP (ref 32–36)
MCV RBC AUTO: 82.5 FL — SIGNIFICANT CHANGE UP (ref 80–100)
MONOCYTES # BLD AUTO: 0.63 K/UL — SIGNIFICANT CHANGE UP (ref 0–0.9)
MONOCYTES NFR BLD AUTO: 6.7 % — SIGNIFICANT CHANGE UP (ref 2–14)
NEUTROPHILS # BLD AUTO: 7.31 K/UL — SIGNIFICANT CHANGE UP (ref 1.8–7.4)
NEUTROPHILS NFR BLD AUTO: 77.6 % — HIGH (ref 43–77)
NRBC # BLD: 0 /100 WBCS — SIGNIFICANT CHANGE UP (ref 0–0)
PHOSPHATE SERPL-MCNC: 3.7 MG/DL — SIGNIFICANT CHANGE UP (ref 2.5–4.5)
PLATELET # BLD AUTO: 294 K/UL — SIGNIFICANT CHANGE UP (ref 150–400)
POTASSIUM SERPL-MCNC: 3.8 MMOL/L — SIGNIFICANT CHANGE UP (ref 3.5–5.3)
POTASSIUM SERPL-SCNC: 3.8 MMOL/L — SIGNIFICANT CHANGE UP (ref 3.5–5.3)
PROT SERPL-MCNC: 5.8 G/DL — LOW (ref 6–8.3)
RBC # BLD: 4.85 M/UL — SIGNIFICANT CHANGE UP (ref 4.2–5.8)
RBC # FLD: 11.9 % — SIGNIFICANT CHANGE UP (ref 10.3–14.5)
SODIUM SERPL-SCNC: 139 MMOL/L — SIGNIFICANT CHANGE UP (ref 135–145)
WBC # BLD: 9.42 K/UL — SIGNIFICANT CHANGE UP (ref 3.8–10.5)
WBC # FLD AUTO: 9.42 K/UL — SIGNIFICANT CHANGE UP (ref 3.8–10.5)

## 2024-09-18 PROCEDURE — 99233 SBSQ HOSP IP/OBS HIGH 50: CPT | Mod: GC

## 2024-09-18 PROCEDURE — 99233 SBSQ HOSP IP/OBS HIGH 50: CPT

## 2024-09-18 RX ORDER — SPIRONOLACTONE 100 MG/1
25 TABLET, FILM COATED ORAL DAILY
Refills: 0 | Status: DISCONTINUED | OUTPATIENT
Start: 2024-09-19 | End: 2024-09-23

## 2024-09-18 RX ORDER — VALSARTAN 320 MG/1
20 TABLET ORAL EVERY 12 HOURS
Refills: 0 | Status: DISCONTINUED | OUTPATIENT
Start: 2024-09-18 | End: 2024-09-18

## 2024-09-18 RX ORDER — METHYLPREDNISOLONE ACETATE 80 MG/ML
60 INJECTION, SUSPENSION INTRA-ARTICULAR; INTRALESIONAL; INTRAMUSCULAR; SOFT TISSUE DAILY
Refills: 0 | Status: DISCONTINUED | OUTPATIENT
Start: 2024-09-19 | End: 2024-09-19

## 2024-09-18 RX ORDER — VALSARTAN 320 MG/1
20 TABLET ORAL EVERY 12 HOURS
Refills: 0 | Status: DISCONTINUED | OUTPATIENT
Start: 2024-09-18 | End: 2024-09-19

## 2024-09-18 RX ORDER — FUROSEMIDE 10 MG/ML
20 INJECTION INTRAVENOUS ONCE
Refills: 0 | Status: COMPLETED | OUTPATIENT
Start: 2024-09-18 | End: 2024-09-18

## 2024-09-18 RX ADMIN — Medication 11 UNIT(S): at 11:43

## 2024-09-18 RX ADMIN — Medication 1 DROP(S): at 07:23

## 2024-09-18 RX ADMIN — Medication 7500 UNIT(S): at 21:55

## 2024-09-18 RX ADMIN — INSULIN GLARGINE 26 UNIT(S): 300 INJECTION, SOLUTION SUBCUTANEOUS at 21:55

## 2024-09-18 RX ADMIN — Medication 11 UNIT(S): at 16:31

## 2024-09-18 RX ADMIN — HYDRALAZINE HYDROCHLORIDE 25 MILLIGRAM(S): 100 TABLET ORAL at 07:36

## 2024-09-18 RX ADMIN — Medication 10 MILLIGRAM(S): at 07:21

## 2024-09-18 RX ADMIN — Medication 7500 UNIT(S): at 07:22

## 2024-09-18 RX ADMIN — VALSARTAN 20 MILLIGRAM(S): 320 TABLET ORAL at 18:13

## 2024-09-18 RX ADMIN — Medication 11 UNIT(S): at 07:23

## 2024-09-18 RX ADMIN — ATORVASTATIN CALCIUM 40 MILLIGRAM(S): 10 TABLET, FILM COATED ORAL at 21:55

## 2024-09-18 RX ADMIN — Medication 4: at 16:30

## 2024-09-18 RX ADMIN — PIPERACILLIN SODIUM AND TAZOBACTAM SODIUM 25 GRAM(S): 12; 1.5 INJECTION, POWDER, LYOPHILIZED, FOR SOLUTION INTRAVENOUS at 21:55

## 2024-09-18 RX ADMIN — METHYLPREDNISOLONE ACETATE 50 MILLIGRAM(S): 80 INJECTION, SUSPENSION INTRA-ARTICULAR; INTRALESIONAL; INTRAMUSCULAR; SOFT TISSUE at 07:23

## 2024-09-18 RX ADMIN — PANTOPRAZOLE SODIUM 40 MILLIGRAM(S): 40 TABLET, DELAYED RELEASE ORAL at 07:22

## 2024-09-18 RX ADMIN — Medication 1 TABLET(S): at 11:42

## 2024-09-18 RX ADMIN — Medication 1 DROP(S): at 18:13

## 2024-09-18 RX ADMIN — Medication 2: at 11:42

## 2024-09-18 RX ADMIN — FUROSEMIDE 20 MILLIGRAM(S): 10 INJECTION INTRAVENOUS at 11:41

## 2024-09-18 RX ADMIN — PIPERACILLIN SODIUM AND TAZOBACTAM SODIUM 25 GRAM(S): 12; 1.5 INJECTION, POWDER, LYOPHILIZED, FOR SOLUTION INTRAVENOUS at 13:38

## 2024-09-18 RX ADMIN — Medication 1 DROP(S): at 11:41

## 2024-09-18 RX ADMIN — Medication 7500 UNIT(S): at 13:34

## 2024-09-18 RX ADMIN — PIPERACILLIN SODIUM AND TAZOBACTAM SODIUM 25 GRAM(S): 12; 1.5 INJECTION, POWDER, LYOPHILIZED, FOR SOLUTION INTRAVENOUS at 07:22

## 2024-09-18 NOTE — PROGRESS NOTE ADULT - SUBJECTIVE AND OBJECTIVE BOX
**INCOMPLETE NOTE    INTERVAL/OVERNIGHT EVENTS: None    SUBJECTIVE:  Patient seen and examined at bedside, comfortable, NAD. Denied fever, chest pain, dyspnea, abdominal pain.     Vital Signs Last 12 Hrs  T(F): 99.3 (09-18-24 @ 06:00), Max: 99.3 (09-18-24 @ 06:00)  HR: 80 (09-18-24 @ 06:59) (72 - 82)  BP: 145/85 (09-18-24 @ 04:06) (114/62 - 146/86)  BP(mean): 109 (09-18-24 @ 04:06) (82 - 110)  RR: 20 (09-18-24 @ 06:59) (18 - 22)  SpO2: 94% (09-18-24 @ 06:59) (93% - 96%)  I&O's Summary    17 Sep 2024 07:01  -  18 Sep 2024 07:00  --------------------------------------------------------  IN: 0 mL / OUT: 1100 mL / NET: -1100 mL        PHYSICAL EXAM:  General: NAD  HEENT: PERRL, EOM intact, sclera anicteric, MMM  Cardiovascular: RRR; no MRG;   Respiratory: CTAB; no WRR  GI/: soft; NTND; BS x4  Extremities: WWP; 2+ peripheral pulses bilaterally; no LE edema  Skin: normal color & turgor; no rash  Neurologic: aox3; no focal deficits    LABS:                        13.3   9.42  )-----------( 294      ( 18 Sep 2024 05:30 )             40.0     09-18    139  |  106  |  37[H]  ----------------------------<  172[H]  3.8   |  24  |  1.17    Ca    8.2[L]      18 Sep 2024 05:30  Phos  3.7     09-18  Mg     1.8     09-18    TPro  5.8[L]  /  Alb  2.7[L]  /  TBili  0.6  /  DBili  x   /  AST  12  /  ALT  18  /  AlkPhos  70  09-18      Urinalysis Basic - ( 18 Sep 2024 05:30 )    Color: x / Appearance: x / SG: x / pH: x  Gluc: 172 mg/dL / Ketone: x  / Bili: x / Urobili: x   Blood: x / Protein: x / Nitrite: x   Leuk Esterase: x / RBC: x / WBC x   Sq Epi: x / Non Sq Epi: x / Bacteria: x          RADIOLOGY & ADDITIONAL TESTS:    MEDICATIONS  (STANDING):  artificial  tears Solution 1 Drop(s) Both EYES four times a day  atorvastatin 40 milliGRAM(s) Oral at bedtime  chlorhexidine 2% Cloths 1 Application(s) Topical <User Schedule>  dextrose 5%. 1000 milliLiter(s) (100 mL/Hr) IV Continuous <Continuous>  dextrose 5%. 1000 milliLiter(s) (50 mL/Hr) IV Continuous <Continuous>  dextrose 50% Injectable 12.5 Gram(s) IV Push once  dextrose 50% Injectable 25 Gram(s) IV Push once  dextrose 50% Injectable 25 Gram(s) IV Push once  glucagon  Injectable 1 milliGRAM(s) IntraMuscular once  heparin   Injectable 7500 Unit(s) SubCutaneous every 8 hours  hydrALAZINE 25 milliGRAM(s) Oral every 8 hours  insulin glargine Injectable (LANTUS) 26 Unit(s) SubCutaneous at bedtime  insulin lispro (ADMELOG) corrective regimen sliding scale   SubCutaneous three times a day before meals  insulin lispro (ADMELOG) corrective regimen sliding scale   SubCutaneous at bedtime  insulin lispro Injectable (ADMELOG) 11 Unit(s) SubCutaneous three times a day before meals  isosorbide   dinitrate Tablet (ISORDIL) 10 milliGRAM(s) Oral three times a day  methylPREDNISolone sodium succinate Injectable 50 milliGRAM(s) IV Push every 12 hours  pantoprazole    Tablet 40 milliGRAM(s) Oral before breakfast  piperacillin/tazobactam IVPB.. 4.5 Gram(s) IV Intermittent every 8 hours  sodium phosphate 15 milliMole(s)/250 mL IVPB 15 milliMole(s) IV Intermittent once  trimethoprim  160 mG/sulfamethoxazole 800 mG 1 Tablet(s) Oral <User Schedule>    MEDICATIONS  (PRN):  dextrose Oral Gel 15 Gram(s) Oral once PRN Blood Glucose LESS THAN 70 milliGRAM(s)/deciliter   INTERVAL/OVERNIGHT EVENTS: None    SUBJECTIVE:  Patient seen and examined at bedside, comfortable, NAD. Breathing well on HFNC. He has not moved out of bed for a while. Denied fever, chest pain, dyspnea, abdominal pain.     Vital Signs Last 12 Hrs  T(F): 99.3 (09-18-24 @ 06:00), Max: 99.3 (09-18-24 @ 06:00)  HR: 80 (09-18-24 @ 06:59) (72 - 82)  BP: 145/85 (09-18-24 @ 04:06) (114/62 - 146/86)  BP(mean): 109 (09-18-24 @ 04:06) (82 - 110)  RR: 20 (09-18-24 @ 06:59) (18 - 22)  SpO2: 94% (09-18-24 @ 06:59) (93% - 96%)  I&O's Summary    17 Sep 2024 07:01  -  18 Sep 2024 07:00  --------------------------------------------------------  IN: 0 mL / OUT: 1100 mL / NET: -1100 mL        PHYSICAL EXAM:  General: NAD  HEENT: PERRL, EOM intact, sclera anicteric, MMM  Cardiovascular: RRR; no MRG;   Respiratory: decreased lung sounds bl  GI/: soft; NTND; BS x4  Extremities: WWP; 2+ peripheral pulses bilaterally; trace BL LE edema  Skin: normal color & turgor; no rash  Neurologic: aox3; no focal deficits    LABS:                        13.3   9.42  )-----------( 294      ( 18 Sep 2024 05:30 )             40.0     09-18    139  |  106  |  37[H]  ----------------------------<  172[H]  3.8   |  24  |  1.17    Ca    8.2[L]      18 Sep 2024 05:30  Phos  3.7     09-18  Mg     1.8     09-18    TPro  5.8[L]  /  Alb  2.7[L]  /  TBili  0.6  /  DBili  x   /  AST  12  /  ALT  18  /  AlkPhos  70  09-18      Urinalysis Basic - ( 18 Sep 2024 05:30 )    Color: x / Appearance: x / SG: x / pH: x  Gluc: 172 mg/dL / Ketone: x  / Bili: x / Urobili: x   Blood: x / Protein: x / Nitrite: x   Leuk Esterase: x / RBC: x / WBC x   Sq Epi: x / Non Sq Epi: x / Bacteria: x          RADIOLOGY & ADDITIONAL TESTS:    MEDICATIONS  (STANDING):  artificial  tears Solution 1 Drop(s) Both EYES four times a day  atorvastatin 40 milliGRAM(s) Oral at bedtime  chlorhexidine 2% Cloths 1 Application(s) Topical <User Schedule>  dextrose 5%. 1000 milliLiter(s) (100 mL/Hr) IV Continuous <Continuous>  dextrose 5%. 1000 milliLiter(s) (50 mL/Hr) IV Continuous <Continuous>  dextrose 50% Injectable 12.5 Gram(s) IV Push once  dextrose 50% Injectable 25 Gram(s) IV Push once  dextrose 50% Injectable 25 Gram(s) IV Push once  glucagon  Injectable 1 milliGRAM(s) IntraMuscular once  heparin   Injectable 7500 Unit(s) SubCutaneous every 8 hours  hydrALAZINE 25 milliGRAM(s) Oral every 8 hours  insulin glargine Injectable (LANTUS) 26 Unit(s) SubCutaneous at bedtime  insulin lispro (ADMELOG) corrective regimen sliding scale   SubCutaneous three times a day before meals  insulin lispro (ADMELOG) corrective regimen sliding scale   SubCutaneous at bedtime  insulin lispro Injectable (ADMELOG) 11 Unit(s) SubCutaneous three times a day before meals  isosorbide   dinitrate Tablet (ISORDIL) 10 milliGRAM(s) Oral three times a day  methylPREDNISolone sodium succinate Injectable 50 milliGRAM(s) IV Push every 12 hours  pantoprazole    Tablet 40 milliGRAM(s) Oral before breakfast  piperacillin/tazobactam IVPB.. 4.5 Gram(s) IV Intermittent every 8 hours  sodium phosphate 15 milliMole(s)/250 mL IVPB 15 milliMole(s) IV Intermittent once  trimethoprim  160 mG/sulfamethoxazole 800 mG 1 Tablet(s) Oral <User Schedule>    MEDICATIONS  (PRN):  dextrose Oral Gel 15 Gram(s) Oral once PRN Blood Glucose LESS THAN 70 milliGRAM(s)/deciliter

## 2024-09-18 NOTE — PROGRESS NOTE ADULT - ASSESSMENT
38M PMH HTN, DM, HLD, hepatitis initially presented to [facility] with SOB and cough transferred from [facility] for AHRF and intubated to [facility] MICU and downgraded to [facility] floor. Pulmonology consulted for AHRF 2/2 severe CAP vs flash pulmonary edema vs eosinophilic pneumonia    At [place], he was started on a course of antibiotics at [facility] with an initial ddx of severe CAP vs  vs PJP. Hospital course prior to transfer was complicated by newly reduced EF of 25% and cardiogenic shock requiring dobutamine and levophed, now weaned off. Bronchoscopy was performed on 9/13 with no BAL, cultures NGTD. HIV was negative.     9/10-9/14 treated with CTX  9/10-9/12 treated with azithromycin   9/11-9/12 treated with bactrim  9/11-9/14 treated with solumedrol  9/13 started on zosyn     Data Review:  CT chest 9/14: Extensive bilateral patchy and consolidative opacities decreased in the upper lobes and increased in the lower lobes compared to prior.   TTE:  EF 40% with mildly reduced LV systolic function, mild dilated Lv and mild symmetric LV hypertrophy  procal elevated to 4.4  Fungitell, c-ANCA, p-ANCA, Atypical ANCA, RF, CCP, DS DNA, Anti-López Ab, Anti-Ribonuclear Protein, Anti Solange-1, Scleroderma Ab, Anti SS-A Ab, Anti SS-B Ab, ANCA Reflex MPO and PROT3 negative       Upon admission to MICU, POCUS on 9/14 showed moderate to severe LV dysfunction with global hypokinesis. Repeat TTE showed EF 40% with mildly reduced LV systolic function, mild dilated Lv and mild symmetric LV hypertrophy. He was extubated on 9/15 to [facility].   With sudden decompensation, AHRF etiology is unclear with severe PNA and ARDS. Newly reduced EF with cardiogenic shock at [place] suggesting flash pulmonary edema as possible cause of his clinical picture vs rapid improvement with steroids also supports eosinophilic PNA as a differential dx, however unable to confirm with no recent BAL prior to steroids. Given no new exposures as per the patient, hypersensitivity pneumonitis. As he is clinically improving with decreasing FiO2 requirements, would hold off on bronchoscopy for now and continue current management.     Recommendations  --continue zosyn for 2 more days for total of 7 days   --continue solumedrol 1mg/kg, will eventually need to taper  --continue bactrim for PCP ppx   --continue to diurese to keep net even   --wean O2 as tolerated INCOMPLETE NOTE  38M PMH HTN, DM, HLD, hepatitis initially presented to [facility] with SOB and cough transferred from [facility] for AHRF and intubated to [facility] MICU and downgraded to [facility] floor. Pulmonology consulted for AHRF 2/2 severe CAP vs flash pulmonary edema vs eosinophilic pneumonia    At [place], he was started on a course of antibiotics at [facility] with an initial ddx of severe CAP vs  vs PJP. Hospital course prior to transfer was complicated by newly reduced EF of 25% and cardiogenic shock requiring dobutamine and levophed, now weaned off. Bronchoscopy was performed on 9/13 with no BAL, cultures NGTD. HIV was negative.     9/10-9/14 treated with CTX  9/10-9/12 treated with azithromycin   9/11-9/12 treated with bactrim  9/11-9/14 treated with solumedrol  9/13 started on zosyn     Data Review:  CT chest 9/14: Extensive bilateral patchy and consolidative opacities decreased in the upper lobes and increased in the lower lobes compared to prior.   TTE:  EF 40% with mildly reduced LV systolic function, mild dilated Lv and mild symmetric LV hypertrophy  procal elevated to 4.4  Fungitell, c-ANCA, p-ANCA, Atypical ANCA, RF, CCP, DS DNA, Anti-López Ab, Anti-Ribonuclear Protein, Anti Solange-1, Scleroderma Ab, Anti SS-A Ab, Anti SS-B Ab, ANCA Reflex MPO and PROT3 negative       Upon admission to MICU, POCUS on 9/14 showed moderate to severe LV dysfunction with global hypokinesis. Repeat TTE showed EF 40% with mildly reduced LV systolic function, mild dilated Lv and mild symmetric LV hypertrophy. He was extubated on 9/15 to [facility].   With sudden decompensation, AHRF etiology is unclear with severe PNA and ARDS. Newly reduced EF with cardiogenic shock at [place] suggesting flash pulmonary edema as possible cause of his clinical picture vs rapid improvement with steroids also supports eosinophilic PNA as a differential dx, however unable to confirm with no recent BAL prior to steroids. Given no new exposures as per the patient, hypersensitivity pneumonitis. As he is clinically improving with decreasing FiO2 requirements, would hold off on bronchoscopy for now and continue current management.     Recommendations  --continue zosyn for 2 more days for total of 7 days   --continue solumedrol 1mg/kg, will eventually need to taper  --continue bactrim for PCP ppx   --continue to diurese to keep net even   --wean O2 as tolerated INCOMPLETE NOTE  38M PMH HTN, DM, HLD, hepatitis initially presented to [facility] with SOB and cough transferred from [facility] for AHRF and intubated to [facility] MICU and downgraded to [facility] floor. Pulmonology consulted for AHRF 2/2 severe CAP vs flash pulmonary edema vs eosinophilic pneumonia    At [place], he was started on a course of antibiotics at [facility] with an initial ddx of severe CAP vs  vs PJP. Hospital course prior to transfer was complicated by newly reduced EF of 25% and cardiogenic shock requiring dobutamine and levophed, now weaned off. Bronchoscopy was performed on 9/13 with no BAL, cultures NGTD. HIV was negative.     9/10-9/14 treated with CTX  9/10-9/12 treated with azithromycin   9/11-9/12 treated with bactrim  9/11-9/14 treated with solumedrol  9/13 started on zosyn     Data Review:  CT chest 9/14: Extensive bilateral patchy and consolidative opacities decreased in the upper lobes and increased in the lower lobes compared to prior.   TTE:  EF 40% with mildly reduced LV systolic function, mild dilated Lv and mild symmetric LV hypertrophy  procal elevated to 4.4  Fungitell, c-ANCA, p-ANCA, Atypical ANCA, RF, CCP, DS DNA, Anti-López Ab, Anti-Ribonuclear Protein, Anti Solange-1, Scleroderma Ab, Anti SS-A Ab, Anti SS-B Ab, ANCA Reflex MPO and PROT3 negative       Upon admission to MICU, POCUS on 9/14 showed moderate to severe LV dysfunction with global hypokinesis. Repeat TTE showed EF 40% with mildly reduced LV systolic function, mild dilated Lv and mild symmetric LV hypertrophy. He was extubated on 9/15 to [facility].   With sudden decompensation, AHRF etiology is unclear with severe PNA and ARDS. Newly reduced EF with cardiogenic shock at [place] suggesting flash pulmonary edema as possible cause of his clinical picture vs rapid improvement with steroids also supports eosinophilic PNA as a differential dx, however unable to confirm with no recent BAL prior to steroids. Given no new exposures as per the patient, hypersensitivity pneumonitis. As he is clinically improving with decreasing FiO2 requirements, would hold off on bronchoscopy for now and continue current management.     Recommendations  --continue 7 day course of Zosyn  --wean Solumedrol to 60mg daily; can discontinue Bactrim for PCP PPX as no longer anticipating prolonged steroid rx at this time  --maintain euvolemia; diurese accordingly  --continue to wean O2 as tolerated  --please organize outpatient f/u with Pulmonary medicine    we will continue to follow with you  38M PMH HTN, DM, HLD, hepatitis initially presented to [facility] with SOB and cough transferred from [facility] for AHRF and intubated to [facility] MICU and downgraded to [facility] floor. Pulmonology consulted for AHRF 2/2 severe CAP vs flash pulmonary edema vs eosinophilic pneumonia    At [place], he was started on a course of antibiotics at [facility] with an initial ddx of severe CAP vs  vs PJP. Hospital course prior to transfer was complicated by newly reduced EF of 25% and cardiogenic shock requiring dobutamine and levophed, now weaned off. Bronchoscopy was performed on 9/13 with no BAL, cultures NGTD. HIV was negative.     9/10-9/14 treated with CTX  9/10-9/12 treated with azithromycin   9/11-9/12 treated with bactrim  9/11-9/14 treated with solumedrol  9/13 started on zosyn     Data Review:  CT chest 9/14: Extensive bilateral patchy and consolidative opacities decreased in the upper lobes and increased in the lower lobes compared to prior.   TTE:  EF 40% with mildly reduced LV systolic function, mild dilated Lv and mild symmetric LV hypertrophy  procal elevated to 4.4  Fungitell, c-ANCA, p-ANCA, Atypical ANCA, RF, CCP, DS DNA, Anti-López Ab, Anti-Ribonuclear Protein, Anti Solange-1, Scleroderma Ab, Anti SS-A Ab, Anti SS-B Ab, ANCA Reflex MPO and PROT3 negative       Upon admission to MICU, POCUS on 9/14 showed moderate to severe LV dysfunction with global hypokinesis. Repeat TTE showed EF 40% with mildly reduced LV systolic function, mild dilated Lv and mild symmetric LV hypertrophy. He was extubated on 9/15 to [facility].   With sudden decompensation, AHRF etiology is unclear with severe PNA and ARDS. Newly reduced EF with cardiogenic shock at [place] suggesting flash pulmonary edema as possible cause of his clinical picture vs rapid improvement with steroids also supports eosinophilic PNA as a differential dx, however unable to confirm with no recent BAL prior to steroids. Given no new exposures as per the patient, hypersensitivity pneumonitis. As he is clinically improving with decreasing FiO2 requirements, would hold off on bronchoscopy for now and continue current management.     Recommendations  --continue 7 day course of Zosyn  --wean Solumedrol to 60mg daily; can discontinue Bactrim for PCP PPX as no longer anticipating prolonged steroid rx at this time  --maintain euvolemia; diurese accordingly  --continue to wean O2 as tolerated  --please organize outpatient f/u with Pulmonary medicine    we will continue to follow with you

## 2024-09-18 NOTE — PROGRESS NOTE ADULT - SUBJECTIVE AND OBJECTIVE BOX
PULMONARY CONSULT SERVICE FOLLOW-UP NOTE    INTERVAL HPI:  Reviewed chart and overnight events; patient seen and examined at bedside. NAD, no acute complaints. States that his SOB is a little better than yesterday, felt slightly winded when ambulating to a chair yesterday. Also reports improvement in frequency of productive cough with clear sputum. Denies fevers/chills    MEDICATIONS:  Pulmonary:    Antimicrobials:  piperacillin/tazobactam IVPB.. 4.5 Gram(s) IV Intermittent every 8 hours  trimethoprim  160 mG/sulfamethoxazole 800 mG 1 Tablet(s) Oral <User Schedule>    Anticoagulants:  heparin   Injectable 7500 Unit(s) SubCutaneous every 8 hours    Cardiac:  hydrALAZINE 25 milliGRAM(s) Oral every 8 hours      Allergies    [place] (Stomach Upset)  [place] (Stomach Upset)  No Known Drug Allergies    Intolerances        Vital Signs Last 24 Hrs  T(C): 36.4 (18 Sep 2024 10:00), Max: 37.9 (17 Sep 2024 13:31)  T(F): 97.5 (18 Sep 2024 10:00), Max: 100.2 (17 Sep 2024 13:31)  HR: 80 (18 Sep 2024 08:06) (72 - 90)  BP: 127/73 (18 Sep 2024 08:06) (114/62 - 162/98)  BP(mean): 94 (18 Sep 2024 08:06) (81 - 124)  RR: 18 (18 Sep 2024 08:06) (18 - 22)  SpO2: 94% (18 Sep 2024 08:06) (93% - 96%)    Parameters below as of 18 Sep 2024 08:06  Patient On (Oxygen Delivery Method): nasal cannula, high flow  O2 Flow (L/min): 40  O2 Concentration (%): 40    09-17 @ 07:01  -  09-18 @ 07:00  --------------------------------------------------------  IN: 0 mL / OUT: 1100 mL / NET: -1100 mL          PHYSICAL EXAM:  Constitutional: WDWN, obese, appeared comfortable on HFNC   Head: NC/AT  EENT: PERRL, anicteric sclera; oropharynx clear, MMM  Neck: supple, no appreciable JVD  Respiratory: (+) decreased breath sounds b/l ; no W/R/R  Cardiovascular: +S1/S2, RRR  Gastrointestinal: soft, NT/ND  Extremities: (+) 1+ pitting edema b/l LEs. WWP; , clubbing or cyanosis  Vascular: 2+ radial pulses B/L  Neurological: awake and alert; [place]    LABS:      CBC Full  -  ( 18 Sep 2024 05:30 )  WBC Count : 9.42 K/uL  RBC Count : 4.85 M/uL  Hemoglobin : 13.3 g/dL  Hematocrit : 40.0 %  Platelet Count - Automated : 294 K/uL  Mean Cell Volume : 82.5 fl  Mean Cell Hemoglobin : 27.4 pg  Mean Cell Hemoglobin Concentration : 33.3 gm/dL  Auto Neutrophil # : 7.31 K/uL  Auto Lymphocyte # : 1.31 K/uL  Auto Monocyte # : 0.63 K/uL  Auto Eosinophil # : 0.02 K/uL  Auto Basophil # : 0.02 K/uL  Auto Neutrophil % : 77.6 %  Auto Lymphocyte % : 13.9 %  Auto Monocyte % : 6.7 %  Auto Eosinophil % : 0.2 %  Auto Basophil % : 0.2 %    09-18    139  |  106  |  37[H]  ----------------------------<  172[H]  3.8   |  24  |  1.17    Ca    8.2[L]      18 Sep 2024 05:30  Phos  3.7     09-18  Mg     1.8     09-18    TPro  5.8[L]  /  Alb  2.7[L]  /  TBili  0.6  /  DBili  x   /  AST  12  /  ALT  18  /  AlkPhos  70  09-18          Urinalysis Basic - ( 18 Sep 2024 05:30 )    Color: x / Appearance: x / SG: x / pH: x  Gluc: 172 mg/dL / Ketone: x  / Bili: x / Urobili: x   Blood: x / Protein: x / Nitrite: x   Leuk Esterase: x / RBC: x / WBC x   Sq Epi: x / Non Sq Epi: x / Bacteria: x                RADIOLOGY & ADDITIONAL STUDIES:

## 2024-09-18 NOTE — PROGRESS NOTE ADULT - ASSESSMENT
38-year-old male hypertension, DM2, HLD, hepatitis presents with acute hypoxic respiratory failure c/b undifferentiated shock w/ new HFrEF    Review of Studies:  TTE 9/16/24: LVIDd 6, LV mildly dilated and mild concentric LVH w/ LVEF 40-45% w/ RWMA, RV normal size and function, LA mildly dilated, RA borderline dilated, trace AR, insufficient TR for PASP, IVC estimated RAP 8   TTE 9/12/24: Left ventricular systolic function is severely decreased with an ejection fraction of 25 % by Valle's method of disks. Global left ventricular hypokinesis. The right ventricle is not well visualized, probably normal right ventricular systolic function. The cardiac valves are not well seen except for the aortic valve which appears normal. No pericardial effusion seen.  ECG: Sinus tachycardia with left axis deviation, NSST changes     #HFrEF  Etiology: Unclear etiology at this time. Stress CM vs. myocarditis vs. ischemic vs. etoh given hx. Would defer CCTA for ischemic eval until DIANDRA improves more   GDMT: D/C hydral/isordil. Start valsartan 20 mg q12 with strict holding parameters 100/60 BP. Per primary team, there will be financial barriers to entresto for this patient unfortunately due to lack of insurance.  Diuretics: Would give furosemide 20 mg IV x1 today.  - Please obtain HIV, urine protein/creatinine. TSH (1.1), iron studies (no iron deficiency noted), lipid panel (), pro-bnp (1806 9/17). A1c noted of 11.6, positive coxsackie A + B titers noted    #DM  - Consider type 1 DM work up as well as endocrine consult to help establish care as well as discharge recs for uncontrolled DM  - Continue atorvastatin 40 mg q24    #HTN  Denies taking any home meds  - See GDMT above 38-year-old male hypertension, DM2, HLD, hepatitis presents with acute hypoxic respiratory failure c/b undifferentiated shock w/ new HFrEF    Review of Studies:  TTE 9/16/24: LVIDd 6, LV mildly dilated and mild concentric LVH w/ LVEF 40-45% w/ RWMA, RV normal size and function, LA mildly dilated, RA borderline dilated, trace AR, insufficient TR for PASP, IVC estimated RAP 8   TTE 9/12/24: Left ventricular systolic function is severely decreased with an ejection fraction of 25 % by Valle's method of disks. Global left ventricular hypokinesis. The right ventricle is not well visualized, probably normal right ventricular systolic function. The cardiac valves are not well seen except for the aortic valve which appears normal. No pericardial effusion seen.  ECG: Sinus tachycardia with left axis deviation, NSST changes     #HFrEF  Etiology: Unclear etiology at this time. Stress CM vs. myocarditis vs. ischemic vs. etoh given hx. Would defer CCTA for ischemic eval until DIANDRA improves more   GDMT: D/C hydral/isordil. Start valsartan 20 mg q12 with strict holding parameters 100/60 BP. Per primary team, there will be financial barriers to entresto for this patient unfortunately due to lack of insurance.  Diuretics: Would give furosemide 20 mg IV x1 today.  - Start spironolactone 25 mg po daily starting 9/19 am.   - Please obtain HIV, urine protein/creatinine. TSH (1.1), iron studies (no iron deficiency noted), lipid panel (), pro-bnp (1806 9/17). A1c noted of 11.6, positive coxsackie A + B titers noted    #DM  - Consider type 1 DM work up as well as endocrine consult to help establish care as well as discharge recs for uncontrolled DM  - Continue atorvastatin 40 mg q24    #HTN  Denies taking any home meds  - See GDMT above

## 2024-09-18 NOTE — PROGRESS NOTE ADULT - SUBJECTIVE AND OBJECTIVE BOX
SUBJECTIVE / INTERVAL HPI: Patient was seen and examined this morning. Pt reports eating some strawberries, blueberries and raspberries for snacks. Otherwise did not eat anything else o/n. Breakfast had some egg whites, saugae and berries. For lunch had 1/2 salmon, all the potatoes, and salad. no longer has polydipsia. polyuria. N/V and had a normal BM. Overall feels ready and wants to go home. After discussing need to go home on insulin, motived to change his lifestyle indicating that he doesn't want to be on insulin for life long.    Overnight events: none    CAPILLARY BLOOD GLUCOSE & INSULIN RECEIVED  163 mg/dL (09-17 @ 07:54)  168 mg/dL (09-17 @ 11:32)  209 mg/dL (09-17 @ 12:47)  182 mg/dL (09-17 @ 16:23)  135 mg/dL (09-17 @ 22:05)  153 mg/dL (09-18 @ 06:14)  142 mg/dL (09-18 @ 07:21)  180 mg/dL (09-18 @ 11:27)      REVIEW OF SYSTEMS  Constitutional:  Negative fever, chills or loss of appetite.  Eyes:  Negative blurry vision or double vision.  Cardiovascular:  Negative for chest pain or palpitations.  Respiratory:  Negative for cough, wheezing, or shortness of breath.    Gastrointestinal:  Negative for nausea, vomiting, diarrhea, constipation, or abdominal pain.  Genitourinary:  Negative frequency, urgency or dysuria.  Neurologic:  No headache, confusion, dizziness, lightheadedness.    PHYSICAL EXAM  Vital Signs Last 24 Hrs  T(C): 36.4 (18 Sep 2024 10:00), Max: 37.4 (18 Sep 2024 06:00)  T(F): 97.5 (18 Sep 2024 10:00), Max: 99.3 (18 Sep 2024 06:00)  HR: 80 (18 Sep 2024 12:28) (72 - 90)  BP: 127/73 (18 Sep 2024 08:06) (114/62 - 155/94)  BP(mean): 94 (18 Sep 2024 08:06) (81 - 118)  RR: 17 (18 Sep 2024 12:28) (17 - 22)  SpO2: 95% (18 Sep 2024 12:28) (93% - 96%)    Parameters below as of 18 Sep 2024 12:28  Patient On (Oxygen Delivery Method): nasal cannula, high flow  O2 Flow (L/min): 40  O2 Concentration (%): 40    Constitutional: Awake, alert, in no acute distress, sitting up eating lunch comfortably  HEENT: Normocephalic, atraumatic, BONNIE.  Respiratory: Lungs clear to ausculation bilaterally.   Cardiovascular: regular rhythm, normal S1 and S2, no audible murmurs.   GI: soft, non-tender, non-distended, bowel sounds present.  Extremities: No lower extremity edema.  Psychiatric: AAO x 3. Normal affect/mood.     LABS  CBC - WBC/HGB/HTC/PLT: 9.42/13.3/40.0/294 (09-18-24)  BMP - Na/K/Cl/Bicarb/BUN/Cr/Gluc/AG/eGFR: 139/3.8/106/24/37/1.17/172/9/82 (09-18-24)  Ca - 8.2 (09-18-24)  Phos - 3.7 (09-18-24)  Mg - 1.8 (09-18-24)  LFT - Alb/Tprot/Tbili/Dbili/AlkPhos/ALT/AST: 2.7/--/0.6/--/70/18/12 (09-18-24)  PT/aPTT/INR: 12.2/24.7/1.07 (09-14-24)   Thyroid Stimulating Hormone, Serum: 1.100 (09-17-24)        09-17-24 @ 07:01  -  09-18-24 @ 07:00  --------------------------------------------------------  IN: 0 mL / OUT: 1100 mL / NET: -1100 mL        MEDICATIONS  MEDICATIONS  (STANDING):  artificial  tears Solution 1 Drop(s) Both EYES four times a day  atorvastatin 40 milliGRAM(s) Oral at bedtime  chlorhexidine 2% Cloths 1 Application(s) Topical <User Schedule>  dextrose 5%. 1000 milliLiter(s) (100 mL/Hr) IV Continuous <Continuous>  dextrose 5%. 1000 milliLiter(s) (50 mL/Hr) IV Continuous <Continuous>  dextrose 50% Injectable 12.5 Gram(s) IV Push once  dextrose 50% Injectable 25 Gram(s) IV Push once  dextrose 50% Injectable 25 Gram(s) IV Push once  glucagon  Injectable 1 milliGRAM(s) IntraMuscular once  heparin   Injectable 7500 Unit(s) SubCutaneous every 8 hours  insulin glargine Injectable (LANTUS) 26 Unit(s) SubCutaneous at bedtime  insulin lispro (ADMELOG) corrective regimen sliding scale   SubCutaneous at bedtime  insulin lispro (ADMELOG) corrective regimen sliding scale   SubCutaneous three times a day before meals  insulin lispro Injectable (ADMELOG) 11 Unit(s) SubCutaneous three times a day before meals  methylPREDNISolone sodium succinate Injectable 50 milliGRAM(s) IV Push every 12 hours  pantoprazole    Tablet 40 milliGRAM(s) Oral before breakfast  piperacillin/tazobactam IVPB.. 4.5 Gram(s) IV Intermittent every 8 hours  sodium phosphate 15 milliMole(s)/250 mL IVPB 15 milliMole(s) IV Intermittent once  trimethoprim  160 mG/sulfamethoxazole 800 mG 1 Tablet(s) Oral <User Schedule>  valsartan 20 milliGRAM(s) Oral every 12 hours    MEDICATIONS  (PRN):  dextrose Oral Gel 15 Gram(s) Oral once PRN Blood Glucose LESS THAN 70 milliGRAM(s)/deciliter    ASSESSMENT / RECOMMENDATIONS  Pt is a 38-year-old male hypertension, DM2, HLD, hepatitis admitted for treatment of acute hypoxic respiratory failure 2/2 hypersensitivity pneumonitis vs eosinophilic pneumonia, course complicated by cardiogenic shock 2/2 new HF.  Endocrinology consulted for DM management    A1C: 11.6 %  BUN: 37  Creatinine: 1.17  GFR: 82  Weight: 109.7  BMI: 42.9    # Type 2 diabetes mellitus with hyperglycemia  - Please keep lantus  units at bedtime.   - keep lispro   units before each meal.  - Continue lispro moderate dose sliding scale before meals and at bedtime.  - Patient's fingerstick glucose goal is 100-180 mg/dL.    - Discharge recommendations to be discussed.   - Patient can follow up at discharge with [facility] by calling [phone] to make an appointment.      Case discussed with Dr. Merker. Primary team updated.   ******************INCOMPLETE*******************   SUBJECTIVE / INTERVAL HPI: Pt was seen and examined at bedside. Pt reports that he did not have any snacks o/n. He had some yogurt for breakfast and for lunch had the potato and yogurt. Did not eat the salmon. Overall feels better and wants to move around. Breathing feels okay on HFNC    Overnight events: none    CAPILLARY BLOOD GLUCOSE & INSULIN RECEIVED  163 mg/dL (09-17 @ 07:54)  168 mg/dL (09-17 @ 11:32)  209 mg/dL (09-17 @ 12:47)  182 mg/dL (09-17 @ 16:23)  135 mg/dL (09-17 @ 22:05)  153 mg/dL (09-18 @ 06:14)  142 mg/dL (09-18 @ 07:21)  180 mg/dL (09-18 @ 11:27)      REVIEW OF SYSTEMS  Constitutional:  Negative fever, chills or loss of appetite.  Eyes:  Negative blurry vision or double vision.  Cardiovascular:  Negative for chest pain or palpitations.  Respiratory:  Negative for cough, wheezing, or shortness of breath.    Gastrointestinal:  Negative for nausea, vomiting, diarrhea, constipation, or abdominal pain.  Genitourinary:  Negative frequency, urgency or dysuria.  Neurologic:  No headache, confusion, dizziness, lightheadedness.    PHYSICAL EXAM  Vital Signs Last 24 Hrs  T(C): 36.4 (18 Sep 2024 10:00), Max: 37.4 (18 Sep 2024 06:00)  T(F): 97.5 (18 Sep 2024 10:00), Max: 99.3 (18 Sep 2024 06:00)  HR: 80 (18 Sep 2024 12:28) (72 - 90)  BP: 127/73 (18 Sep 2024 08:06) (114/62 - 155/94)  BP(mean): 94 (18 Sep 2024 08:06) (81 - 118)  RR: 17 (18 Sep 2024 12:28) (17 - 22)  SpO2: 95% (18 Sep 2024 12:28) (93% - 96%)    Parameters below as of 18 Sep 2024 12:28  Patient On (Oxygen Delivery Method): nasal cannula, high flow  O2 Flow (L/min): 40  O2 Concentration (%): 40    Constitutional: Awake, alert, in no acute distress, sitting up comfortably  HEENT: Normocephalic, atraumatic, BONNIE.  Respiratory: lung with diminished breath sounds b/l  Cardiovascular: regular rhythm, normal S1 and S2, no audible murmurs.   GI: soft, non-tender, non-distended, bowel sounds present.  Extremities: No lower extremity edema.  Psychiatric: AAO x 3. Normal affect/mood.     LABS  CBC - WBC/HGB/HTC/PLT: 9.42/13.3/40.0/294 (09-18-24)  BMP - Na/K/Cl/Bicarb/BUN/Cr/Gluc/AG/eGFR: 139/3.8/106/24/37/1.17/172/9/82 (09-18-24)  Ca - 8.2 (09-18-24)  Phos - 3.7 (09-18-24)  Mg - 1.8 (09-18-24)  LFT - Alb/Tprot/Tbili/Dbili/AlkPhos/ALT/AST: 2.7/--/0.6/--/70/18/12 (09-18-24)  PT/aPTT/INR: 12.2/24.7/1.07 (09-14-24)   Thyroid Stimulating Hormone, Serum: 1.100 (09-17-24)        09-17-24 @ 07:01  -  09-18-24 @ 07:00  --------------------------------------------------------  IN: 0 mL / OUT: 1100 mL / NET: -1100 mL        MEDICATIONS  MEDICATIONS  (STANDING):  artificial  tears Solution 1 Drop(s) Both EYES four times a day  atorvastatin 40 milliGRAM(s) Oral at bedtime  chlorhexidine 2% Cloths 1 Application(s) Topical <User Schedule>  dextrose 5%. 1000 milliLiter(s) (100 mL/Hr) IV Continuous <Continuous>  dextrose 5%. 1000 milliLiter(s) (50 mL/Hr) IV Continuous <Continuous>  dextrose 50% Injectable 12.5 Gram(s) IV Push once  dextrose 50% Injectable 25 Gram(s) IV Push once  dextrose 50% Injectable 25 Gram(s) IV Push once  glucagon  Injectable 1 milliGRAM(s) IntraMuscular once  heparin   Injectable 7500 Unit(s) SubCutaneous every 8 hours  insulin glargine Injectable (LANTUS) 26 Unit(s) SubCutaneous at bedtime  insulin lispro (ADMELOG) corrective regimen sliding scale   SubCutaneous at bedtime  insulin lispro (ADMELOG) corrective regimen sliding scale   SubCutaneous three times a day before meals  insulin lispro Injectable (ADMELOG) 11 Unit(s) SubCutaneous three times a day before meals  methylPREDNISolone sodium succinate Injectable 50 milliGRAM(s) IV Push every 12 hours  pantoprazole    Tablet 40 milliGRAM(s) Oral before breakfast  piperacillin/tazobactam IVPB.. 4.5 Gram(s) IV Intermittent every 8 hours  sodium phosphate 15 milliMole(s)/250 mL IVPB 15 milliMole(s) IV Intermittent once  trimethoprim  160 mG/sulfamethoxazole 800 mG 1 Tablet(s) Oral <User Schedule>  valsartan 20 milliGRAM(s) Oral every 12 hours    MEDICATIONS  (PRN):  dextrose Oral Gel 15 Gram(s) Oral once PRN Blood Glucose LESS THAN 70 milliGRAM(s)/deciliter    ASSESSMENT / RECOMMENDATIONS  Pt is a 38-year-old male hypertension, DM2, HLD, hepatitis admitted for treatment of acute hypoxic respiratory failure 2/2 hypersensitivity pneumonitis vs eosinophilic pneumonia, course complicated by cardiogenic shock 2/2 new HF.  Endocrinology consulted for DM management    A1C: 11.6 %  BUN: 37  Creatinine: 1.17  GFR: 82  Weight: 109.7  BMI: 42.9    # Type 2 diabetes mellitus with hyperglycemia  - Please keep lantus  units at bedtime.   - keep lispro   units before each meal.  - Continue lispro moderate dose sliding scale before meals and at bedtime.  - Patient's fingerstick glucose goal is 100-180 mg/dL.    - Discharge recommendations to be discussed.   - Patient can follow up at discharge with [facility] by calling [phone] to make an appointment.      Case discussed with Dr. Brantley. Primary team updated.   ******************INCOMPLETE*******************   SUBJECTIVE / INTERVAL HPI: Pt was seen and examined at bedside. Pt reports that he did not have any snacks o/n. He had some yogurt for breakfast and for lunch had the potato and yogurt. Did not eat the salmon. Overall feels better and wants to move around. Breathing feels okay on HFNC    Overnight events: none    CAPILLARY BLOOD GLUCOSE & INSULIN RECEIVED  163 mg/dL (09-17 @ 07:54)  168 mg/dL (09-17 @ 11:32)  209 mg/dL (09-17 @ 12:47)  182 mg/dL (09-17 @ 16:23)  135 mg/dL (09-17 @ 22:05)  153 mg/dL (09-18 @ 06:14)  142 mg/dL (09-18 @ 07:21)  180 mg/dL (09-18 @ 11:27)      REVIEW OF SYSTEMS  Constitutional:  Negative fever, chills or loss of appetite.  Eyes:  Negative blurry vision or double vision.  Cardiovascular:  Negative for chest pain or palpitations.  Respiratory:  Negative for cough, wheezing, or shortness of breath.    Gastrointestinal:  Negative for nausea, vomiting, diarrhea, constipation, or abdominal pain.  Genitourinary:  Negative frequency, urgency or dysuria.  Neurologic:  No headache, confusion, dizziness, lightheadedness.    PHYSICAL EXAM  Vital Signs Last 24 Hrs  T(C): 36.4 (18 Sep 2024 10:00), Max: 37.4 (18 Sep 2024 06:00)  T(F): 97.5 (18 Sep 2024 10:00), Max: 99.3 (18 Sep 2024 06:00)  HR: 80 (18 Sep 2024 12:28) (72 - 90)  BP: 127/73 (18 Sep 2024 08:06) (114/62 - 155/94)  BP(mean): 94 (18 Sep 2024 08:06) (81 - 118)  RR: 17 (18 Sep 2024 12:28) (17 - 22)  SpO2: 95% (18 Sep 2024 12:28) (93% - 96%)    Parameters below as of 18 Sep 2024 12:28  Patient On (Oxygen Delivery Method): nasal cannula, high flow  O2 Flow (L/min): 40  O2 Concentration (%): 40    Constitutional: Awake, alert, in no acute distress, sitting up comfortably  HEENT: Normocephalic, atraumatic, BONNIE.  Respiratory: lung with diminished breath sounds b/l  Cardiovascular: regular rhythm, normal S1 and S2, no audible murmurs.   GI: soft, non-tender, non-distended, bowel sounds present.  Extremities: No lower extremity edema.  Psychiatric: AAO x 3. Normal affect/mood.     LABS  CBC - WBC/HGB/HTC/PLT: 9.42/13.3/40.0/294 (09-18-24)  BMP - Na/K/Cl/Bicarb/BUN/Cr/Gluc/AG/eGFR: 139/3.8/106/24/37/1.17/172/9/82 (09-18-24)  Ca - 8.2 (09-18-24)  Phos - 3.7 (09-18-24)  Mg - 1.8 (09-18-24)  LFT - Alb/Tprot/Tbili/Dbili/AlkPhos/ALT/AST: 2.7/--/0.6/--/70/18/12 (09-18-24)  PT/aPTT/INR: 12.2/24.7/1.07 (09-14-24)   Thyroid Stimulating Hormone, Serum: 1.100 (09-17-24)        09-17-24 @ 07:01  -  09-18-24 @ 07:00  --------------------------------------------------------  IN: 0 mL / OUT: 1100 mL / NET: -1100 mL        MEDICATIONS  MEDICATIONS  (STANDING):  artificial  tears Solution 1 Drop(s) Both EYES four times a day  atorvastatin 40 milliGRAM(s) Oral at bedtime  chlorhexidine 2% Cloths 1 Application(s) Topical <User Schedule>  dextrose 5%. 1000 milliLiter(s) (100 mL/Hr) IV Continuous <Continuous>  dextrose 5%. 1000 milliLiter(s) (50 mL/Hr) IV Continuous <Continuous>  dextrose 50% Injectable 12.5 Gram(s) IV Push once  dextrose 50% Injectable 25 Gram(s) IV Push once  dextrose 50% Injectable 25 Gram(s) IV Push once  glucagon  Injectable 1 milliGRAM(s) IntraMuscular once  heparin   Injectable 7500 Unit(s) SubCutaneous every 8 hours  insulin glargine Injectable (LANTUS) 26 Unit(s) SubCutaneous at bedtime  insulin lispro (ADMELOG) corrective regimen sliding scale   SubCutaneous at bedtime  insulin lispro (ADMELOG) corrective regimen sliding scale   SubCutaneous three times a day before meals  insulin lispro Injectable (ADMELOG) 11 Unit(s) SubCutaneous three times a day before meals  methylPREDNISolone sodium succinate Injectable 50 milliGRAM(s) IV Push every 12 hours  pantoprazole    Tablet 40 milliGRAM(s) Oral before breakfast  piperacillin/tazobactam IVPB.. 4.5 Gram(s) IV Intermittent every 8 hours  sodium phosphate 15 milliMole(s)/250 mL IVPB 15 milliMole(s) IV Intermittent once  trimethoprim  160 mG/sulfamethoxazole 800 mG 1 Tablet(s) Oral <User Schedule>  valsartan 20 milliGRAM(s) Oral every 12 hours    MEDICATIONS  (PRN):  dextrose Oral Gel 15 Gram(s) Oral once PRN Blood Glucose LESS THAN 70 milliGRAM(s)/deciliter    ASSESSMENT / RECOMMENDATIONS  Pt is a 38-year-old male hypertension, DM2, HLD, hepatitis admitted for treatment of acute hypoxic respiratory failure 2/2 hypersensitivity pneumonitis vs eosinophilic pneumonia, course complicated by cardiogenic shock 2/2 new HF.  Endocrinology consulted for DM management    A1C: 11.6 %  BUN: 37  Creatinine: 1.17  GFR: 82  Weight: 109.7  BMI: 42.9    # Type 2 diabetes mellitus with hyperglycemia  - Please keep lantus 26  units at bedtime.   - keep lispro 11  units before each meal.  - Continue lispro moderate dose sliding scale before meals and at bedtime.  - Patient's fingerstick glucose goal is 100-180 mg/dL.    - Discharge recommendations to be discussed.   - Patient can follow up at discharge with [facility] by calling [phone] to make an appointment.      Case discussed with Dr. Brantley. Primary team updated.

## 2024-09-18 NOTE — PROGRESS NOTE ADULT - ASSESSMENT
38-year-old male hypertension, DM2, HLD, hepatitis admitted for treatment of acute hypoxic respiratory failure 2/2 hypersensitivity pneumonitis vs eosinophilic pneumonia, course complicated by cardiogenic shock, now of pressor support, stepped down to medicine telemetry for further management     NEURO  Extubated 9/15    CV  #cardiogenic shock - RESOLVED   While at [facility] patient was found  to have EF 25% on TTE  with global left ventricular hypokinesis requiring  levophed and dobutamine,   Tddx include ischemic cardiomyopathy vs eosinophilic myocardiosis (given high suspension for eosinophilic pneumonia after improvement with IV steroids) vs alcohol induced cardiomyopathy (given history of alcohol use).   POCUS on admission 9/14 showed moderate to severe LV dysfunction with global hypokinesis  Now off pressor support. A-line removed.   Plan  - Cardiology following, f/u recs  - Repat TTE on 9/16, 40-45%, improved reported TTE at [place] 25%.   - Per Cardiology: d/c Hydral 10mg, Isosorbide 5mg; start valsartan 20mg BID with hold parameters  - Lasix 20mg IVP  - Goal MAP >65    #HTN  Per chart review, home lisinopril 40mg and HCTZ 25mg but has not taken for past 2 months, however unsure if patient has been taking medications at home   Now Off pressor support   Map: 96  Plan   - start valsartan 20mg BID with hold parameters  - Goal MAP > 65       PULM  #acute hypoxic respiratory failure  Possibly 2/2 hypersensitivity pneumonitis vs eosinophilic pneumonia  Patient  initially presented to [facility] on 9/10 for SOB, PERRY, cough x 2 weeks. Found to have increasing O2 requirements initially placed on NC > HFNC > BiPAP, eventually requiring intubation, was proned 9/12-9/13 and paralyzed per ARDS protocol  CT- chest significant for Multi- focal pneumonia.   Of  note patient was treated with steroids, was noted to improve rapidly after treatment, which was highly concerning for hypersensitivity pneumonitis vs eosinophilic pneumonia  Patient was treated with CTX 9/10-9/14, Azithro 9/10-9/12, Bactrim 9/11-9/12, iv solumedrol 9/11-9/14 at OSH. Noted improvement upon admission to [facility] ICU s/p solumedrol, making hypersensitivity pneumonitis vs eosinophilic pneumonia the leading differential given treatment is the same.   s/p Bronchoscopy 9/13.   Repeat CT- chest with improved bilateral infiltrates  Fungitell, c-ANCA, p-ANCA, Atypical ANCA, RF, CCP, DS DNA, Anti-López Ab, Anti-Ribonuclear Protein, Anti Solange-1, Scleroderma Ab, Anti SS-A Ab, Anti SS-B Ab, ANCA Reflex MPO and PROT3 negative   Coxsackie A and B titers positive  Plan  -   - continue zosyn 4.5 g q 12 end date 9/19  - c/w methylprednisolone 50mg IV BID  - c/w bactrim PCP prophylaxis  - Needs pulmonology outpatient follow up in [place]    RENAL  #DIANDRA  Hess exchanged 9/14  At OSH noted to have increase in BUN and Cr with concern for anuria, making urine at [facility]   Suspect prerenal in setting of cardiogenic shock with possibly septic shock   Cr. 3.49 ----> 2.70 ---> 1.17  Urine Lytes 9/15: Na<20, Cr:120, Urea: 647, Osm: 396   Plan  - strict is/os  - trend BMP    GI  Extubated, carb consistent diet      ENDO  #DM  Poorly controlled   A1c 12% during [facility] Admission   Per chart review, Home metformin 1000mg BID but has not taken for past 2 months  - discontinue insulin gtt, no indication for DKA  - c/w hISS  - c/w basal insulin (26U Lantus bedtime)  - c/w lispro 11U premeal  - FSG q4hrs    #HLD  Per chart review, on a statin (unknown which one) but not taking past 2 months.  - holding home meds    ID  HATTIE    HEME/ONC  #DVT prophylaxis   - heparin subq  - maintain active T&S  - transfuse if Hgb <7    PREVENTIVE   F: as needed  E: Replete if K<4, Mg<2  N: CCB  GI: PPI  DVT: heparin subq  DISPO: 7Lach   38-year-old male hypertension, DM2, HLD, hepatitis admitted for treatment of acute hypoxic respiratory failure 2/2 hypersensitivity pneumonitis vs eosinophilic pneumonia, course complicated by cardiogenic shock, now of pressor support, stepped down to medicine telemetry for further management     NEURO  Extubated 9/15    CV  #cardiogenic shock - RESOLVED   While at [facility] patient was found  to have EF 25% on TTE  with global left ventricular hypokinesis requiring  levophed and dobutamine,   Tddx include ischemic cardiomyopathy vs eosinophilic myocardiosis (given high suspension for eosinophilic pneumonia after improvement with IV steroids) vs alcohol induced cardiomyopathy (given history of alcohol use).   POCUS on admission 9/14 showed moderate to severe LV dysfunction with global hypokinesis  Now off pressor support. A-line removed.   Plan  - Cardiology following, f/u recs  - Repeat TTE on 9/16, 40-45%, improved reported TTE at [place] 25%.   - Per Cardiology: d/c Hydral 10mg, Isosorbide 5mg; start valsartan 20mg BID with hold parameters  - Lasix 20mg IVP  - Goal MAP >65    #HTN  Per chart review, home lisinopril 40mg and HCTZ 25mg but has not taken for past 2 months, however unsure if patient has been taking medications at home   Now Off pressor support   Map: 96  Plan   - start valsartan 20mg BID with hold parameters  - Goal MAP > 65       PULM  #acute hypoxic respiratory failure  Possibly 2/2 hypersensitivity pneumonitis vs eosinophilic pneumonia  Patient  initially presented to [facility] on 9/10 for SOB, PERRY, cough x 2 weeks. Found to have increasing O2 requirements initially placed on NC > HFNC > BiPAP, eventually requiring intubation, was proned 9/12-9/13 and paralyzed per ARDS protocol  CT- chest significant for Multi- focal pneumonia.   Of  note patient was treated with steroids, was noted to improve rapidly after treatment, which was highly concerning for hypersensitivity pneumonitis vs eosinophilic pneumonia  Patient was treated with CTX 9/10-9/14, Azithro 9/10-9/12, Bactrim 9/11-9/12, iv solumedrol 9/11-9/14 at OSH. Noted improvement upon admission to [facility] ICU s/p solumedrol, making hypersensitivity pneumonitis vs eosinophilic pneumonia the leading differential given treatment is the same.   s/p Bronchoscopy 9/13.   Repeat CT- chest with improved bilateral infiltrates  Fungitell, c-ANCA, p-ANCA, Atypical ANCA, RF, CCP, DS DNA, Anti-López Ab, Anti-Ribonuclear Protein, Anti Solange-1, Scleroderma Ab, Anti SS-A Ab, Anti SS-B Ab, ANCA Reflex MPO and PROT3 negative   Coxsackie A and B titers positive  Plan  - OOB, IS  - continue zosyn 4.5 g q 12 end date 9/19  - c/w methylprednisolone 50mg IV BID  - c/w bactrim PCP prophylaxis  - Needs pulmonology outpatient follow up in [place]    RENAL  #DIANDRA  Hess exchanged 9/14  At OSH noted to have increase in BUN and Cr with concern for anuria, making urine at [facility]   Suspect prerenal in setting of cardiogenic shock with possibly septic shock   Cr. 3.49 ----> 2.70 ---> 1.17  Urine Lytes 9/15: Na<20, Cr:120, Urea: 647, Osm: 396   Plan  - strict is/os  - trend BMP    GI  Extubated, carb consistent diet      ENDO  #DM  Poorly controlled   A1c 12% during [facility] Admission   Per chart review, Home metformin 1000mg BID but has not taken for past 2 months  - discontinue insulin gtt, no indication for DKA  - c/w hISS  - c/w basal insulin (26U Lantus bedtime)  - c/w lispro 11U premeal  - FSG q4hrs    #HLD  Per chart review, on a statin (unknown which one) but not taking past 2 months.  - holding home meds    ID  HATTIE    HEME/ONC  #DVT prophylaxis   - heparin subq  - maintain active T&S  - transfuse if Hgb <7    PREVENTIVE   F: as needed  E: Replete if K<4, Mg<2  N: CCB  GI: PPI  DVT: heparin subq  DISPO: 7Lach

## 2024-09-18 NOTE — PROGRESS NOTE ADULT - SUBJECTIVE AND OBJECTIVE BOX
Cardiology  Aniya Holly | PGY-4    OVERNIGHT EVENTS: No overnight events    Tele: sinus 75-85  SUBJECTIVE: Denies chest pain, palpitations, dizziness/syncope, worsening dyspnea, worsening b/l LE edema.      MEDICATIONS  (STANDING):  artificial  tears Solution 1 Drop(s) Both EYES four times a day  atorvastatin 40 milliGRAM(s) Oral at bedtime  chlorhexidine 2% Cloths 1 Application(s) Topical <User Schedule>  dextrose 5%. 1000 milliLiter(s) (100 mL/Hr) IV Continuous <Continuous>  dextrose 5%. 1000 milliLiter(s) (50 mL/Hr) IV Continuous <Continuous>  dextrose 50% Injectable 12.5 Gram(s) IV Push once  dextrose 50% Injectable 25 Gram(s) IV Push once  dextrose 50% Injectable 25 Gram(s) IV Push once  glucagon  Injectable 1 milliGRAM(s) IntraMuscular once  heparin   Injectable 7500 Unit(s) SubCutaneous every 8 hours  insulin glargine Injectable (LANTUS) 26 Unit(s) SubCutaneous at bedtime  insulin lispro (ADMELOG) corrective regimen sliding scale   SubCutaneous at bedtime  insulin lispro (ADMELOG) corrective regimen sliding scale   SubCutaneous three times a day before meals  insulin lispro Injectable (ADMELOG) 11 Unit(s) SubCutaneous three times a day before meals  methylPREDNISolone sodium succinate Injectable 50 milliGRAM(s) IV Push every 12 hours  pantoprazole    Tablet 40 milliGRAM(s) Oral before breakfast  piperacillin/tazobactam IVPB.. 4.5 Gram(s) IV Intermittent every 8 hours  sodium phosphate 15 milliMole(s)/250 mL IVPB 15 milliMole(s) IV Intermittent once  trimethoprim  160 mG/sulfamethoxazole 800 mG 1 Tablet(s) Oral <User Schedule>  valsartan 20 milliGRAM(s) Oral every 12 hours    MEDICATIONS  (PRN):  dextrose Oral Gel 15 Gram(s) Oral once PRN Blood Glucose LESS THAN 70 milliGRAM(s)/deciliter        T(F): 97.5 (09-18-24 @ 10:00), Max: 100.2 (09-17-24 @ 13:31)  HR: 80 (09-18-24 @ 12:28) (72 - 90)  BP: 127/73 (09-18-24 @ 08:06) (114/62 - 155/94)  BP(mean): 94 (09-18-24 @ 08:06) (81 - 118)  RR: 17 (09-18-24 @ 12:28) (17 - 22)  SpO2: 95% (09-18-24 @ 12:28) (93% - 96%)    PHYSICAL EXAM:     GENERAL: NAD, lethargic  HEAD:  Atraumatic, Normocephalic  EYES: EOMI, PERRLA, conjunctiva and sclera clear, no nystagmus noted  ENT: Moist mucous membranes,   NECK: Supple, No JVD, trachea midline  CHEST/LUNG: Clear to auscultation bilaterally; No rales, rhonchi, wheezing, or rubs. Unlabored respirations  HEART: Regular rate and rhythm; No murmurs, rubs, or gallops, normal S1/S2  ABDOMEN: normal bowel sounds; Soft, nontender, nondistended, no organomegaly   EXTREMITIES:  2+ Peripheral Pulses, brisk capillary refill. 1+ pitting edema to ankles  MSK: No gross deformities noted   Neurological:  A&Ox3, no focal deficits   SKIN: No rashes or lesions  PSYCH: Normal mood, affect     TELEMETRY:    LABS:                        13.3   9.42  )-----------( 294      ( 18 Sep 2024 05:30 )             40.0     09-18    139  |  106  |  37[H]  ----------------------------<  172[H]  3.8   |  24  |  1.17    Ca    8.2[L]      18 Sep 2024 05:30  Phos  3.7     09-18  Mg     1.8     09-18    TPro  5.8[L]  /  Alb  2.7[L]  /  TBili  0.6  /  DBili  x   /  AST  12  /  ALT  18  /  AlkPhos  70  09-18            Creatinine Trend: 1.17<--, 1.36<--, 1.41<--, 2.70<--, 2.63<--, 3.49<--  I&O's Summary    17 Sep 2024 07:01  -  18 Sep 2024 07:00  --------------------------------------------------------  IN: 0 mL / OUT: 1100 mL / NET: -1100 mL      BNP    RADIOLOGY & ADDITIONAL STUDIES:

## 2024-09-18 NOTE — PROGRESS NOTE ADULT - ATTENDING COMMENTS
Patient is a 38-yo old male with Pmhx of Hypertension, DM2, HLD, Etoh use disorder, non compliant with Medical therapy, transferred from OSH, for management of acute hypoxic respiratory failure with concern for Eosinophilic pneumonitis, found to be in Shock, with ATN (resolving) and severe LV dysfunction for which cardiology was consulted     Review of Studies:  - TTE 09/16/2024: Dilated LV, EF 40% Global Hypokinesis; Mild inferior wal hypokinesis; LA borderline dilated; Mildly dilated RA  - TTE 9/12/24: Left ventricular systolic function is severely decreased with an ejection fraction of 25 % by Valle's method of disks. Global left ventricular hypokinesis. The right ventricle is not well visualized, probably normal right ventricular systolic function. The cardiac valves are not well seen except for the aortic valve which appears normal. No pericardial effusion seen.    # Systolic Heart Failure, likely Acute on Chronic  - Patient has known prior Hx of HTN and DM, previously on Lisinopril. It appears he has not taken his medications '' for a while'' after his Rx ran out  - Patient works in the [employer] industry and as such reports consuming Alchohol on the daily, at times tasting over a dozen drinks a day. He denies history of DTs or ETOH intoxication requiring hospitalization. He denies hx of Drug use or family Hx of Heart Failure  - In the past year he has noticed progressive worsening shortness of breath, and weight gain. When prompted further about symptoms and history patient is not entirely forthcoming or direct about his symptoms, time of onset and progression etc  - Echo reviewed. EF better visualized on Definity pictures. LV function is mildly reduced around 40%. The LV is dilated. There is borderline biatrial enlargement and the IVC is dilated consistent with elevated RAP.   - Clinically patient is warm, well perfused and compensated. He continues to require oxygen  - ATN is resolving with continued improvement in UO and renal function  - At this time Etiology of Cardiomyopathy likely Non ischemic in nature.   - As patient not insured with High Co pay for Entresto will maintain on Valsartan  - Please initiate Valsartan 20 mg po BID with strict holding parameters with goal to uptitrate as tolerates  - As renal function has recovered, will DC hydralazine 25 mg po TID and Isordil 10 mg po TID in favor of ARB uptitration  - Will initiate BB therapy with Lopressor 12.5 mg po BID starting 9/19 am. Ultimately patient will be discharged on Toprol   - Would diurese today with Lasix 20 IVP x1 and maintain on Lasix 20 mg po daily thereafter  - Would Start spironolactone 25 mg po daily starting 9/19 am.   - Endocrine following for A1c of 12.   - TSH WNL. Patient with Marked Hypertriglyceridemia and HLD with LDL of 120. Given concurrent DM, would initiate Lipitor 40 mg po Qd  - Will consider ischemic evaluation via CCTA once renal function normalizes. Low overall suspicion for CAD as HF culprit.  - Once patient more alert and able to participate more in History will discuss family history and role of genetic screening more at length  - Cardiology will cont to follow with you, please call with any questions .

## 2024-09-18 NOTE — PROGRESS NOTE ADULT - ATTENDING COMMENTS
The patient remains on Nasal cannula O2.He continues on 50 mg Hydrocortisone twice dailky. Glucose levels have been [phone] and today 180.I agree with continuing 26 units Lantus Insulin and 11 units pre-meal Lispro Insulin

## 2024-09-19 ENCOUNTER — TRANSCRIPTION ENCOUNTER (OUTPATIENT)
Age: 39
End: 2024-09-19

## 2024-09-19 DIAGNOSIS — R57.0 CARDIOGENIC SHOCK: ICD-10-CM

## 2024-09-19 DIAGNOSIS — E78.5 HYPERLIPIDEMIA, UNSPECIFIED: ICD-10-CM

## 2024-09-19 DIAGNOSIS — I10 ESSENTIAL (PRIMARY) HYPERTENSION: ICD-10-CM

## 2024-09-19 DIAGNOSIS — N17.9 ACUTE KIDNEY FAILURE, UNSPECIFIED: ICD-10-CM

## 2024-09-19 DIAGNOSIS — Z29.9 ENCOUNTER FOR PROPHYLACTIC MEASURES, UNSPECIFIED: ICD-10-CM

## 2024-09-19 DIAGNOSIS — J96.01 ACUTE RESPIRATORY FAILURE WITH HYPOXIA: ICD-10-CM

## 2024-09-19 DIAGNOSIS — E11.9 TYPE 2 DIABETES MELLITUS WITHOUT COMPLICATIONS: ICD-10-CM

## 2024-09-19 LAB
A FLAVUS AB FLD QL: NEGATIVE — SIGNIFICANT CHANGE UP
A NIGER AB FLD QL: NEGATIVE — SIGNIFICANT CHANGE UP
A NIGER AB FLD QL: NEGATIVE — SIGNIFICANT CHANGE UP
ADD ON TEST-SPECIMEN IN LAB: SIGNIFICANT CHANGE UP
ANION GAP SERPL CALC-SCNC: 9 MMOL/L — SIGNIFICANT CHANGE UP (ref 5–17)
ANISOCYTOSIS BLD QL: SLIGHT — SIGNIFICANT CHANGE UP
BASOPHILS # BLD AUTO: 0 K/UL — SIGNIFICANT CHANGE UP (ref 0–0.2)
BASOPHILS NFR BLD AUTO: 0 % — SIGNIFICANT CHANGE UP (ref 0–2)
BUN SERPL-MCNC: 28 MG/DL — HIGH (ref 7–23)
CALCIUM SERPL-MCNC: 8.1 MG/DL — LOW (ref 8.4–10.5)
CHLORIDE SERPL-SCNC: 104 MMOL/L — SIGNIFICANT CHANGE UP (ref 96–108)
CO2 SERPL-SCNC: 25 MMOL/L — SIGNIFICANT CHANGE UP (ref 22–31)
CREAT SERPL-MCNC: 1.14 MG/DL — SIGNIFICANT CHANGE UP (ref 0.5–1.3)
EGFR: 84 ML/MIN/1.73M2 — SIGNIFICANT CHANGE UP
EOSINOPHIL # BLD AUTO: 0.4 K/UL — SIGNIFICANT CHANGE UP (ref 0–0.5)
EOSINOPHIL NFR BLD AUTO: 3.5 % — SIGNIFICANT CHANGE UP (ref 0–6)
GIANT PLATELETS BLD QL SMEAR: PRESENT — SIGNIFICANT CHANGE UP
GLUCOSE BLDC GLUCOMTR-MCNC: 142 MG/DL — HIGH (ref 70–99)
GLUCOSE BLDC GLUCOMTR-MCNC: 161 MG/DL — HIGH (ref 70–99)
GLUCOSE BLDC GLUCOMTR-MCNC: 184 MG/DL — HIGH (ref 70–99)
GLUCOSE BLDC GLUCOMTR-MCNC: 98 MG/DL — SIGNIFICANT CHANGE UP (ref 70–99)
GLUCOSE SERPL-MCNC: 116 MG/DL — HIGH (ref 70–99)
H CAPSUL AG SER IA-MCNC: SIGNIFICANT CHANGE UP NG/ML
HCT VFR BLD CALC: 40.2 % — SIGNIFICANT CHANGE UP (ref 39–50)
HGB BLD-MCNC: 13.8 G/DL — SIGNIFICANT CHANGE UP (ref 13–17)
HISTPL INTERPRETATION: NEGATIVE — SIGNIFICANT CHANGE UP
HISTPL SPECIMEN TYPE: SIGNIFICANT CHANGE UP
HYPOCHROMIA BLD QL: SLIGHT — SIGNIFICANT CHANGE UP
LYMPHOCYTES # BLD AUTO: 26.3 % — SIGNIFICANT CHANGE UP (ref 13–44)
LYMPHOCYTES # BLD AUTO: 3.02 K/UL — SIGNIFICANT CHANGE UP (ref 1–3.3)
MAGNESIUM SERPL-MCNC: 1.7 MG/DL — SIGNIFICANT CHANGE UP (ref 1.6–2.6)
MANUAL SMEAR VERIFICATION: SIGNIFICANT CHANGE UP
MCHC RBC-ENTMCNC: 28.4 PG — SIGNIFICANT CHANGE UP (ref 27–34)
MCHC RBC-ENTMCNC: 34.3 GM/DL — SIGNIFICANT CHANGE UP (ref 32–36)
MCV RBC AUTO: 82.7 FL — SIGNIFICANT CHANGE UP (ref 80–100)
METAMYELOCYTES # FLD: 1.8 % — HIGH (ref 0–0)
MONOCYTES # BLD AUTO: 0.4 K/UL — SIGNIFICANT CHANGE UP (ref 0–0.9)
MONOCYTES NFR BLD AUTO: 3.5 % — SIGNIFICANT CHANGE UP (ref 2–14)
NEUTROPHILS # BLD AUTO: 7.46 K/UL — HIGH (ref 1.8–7.4)
NEUTROPHILS NFR BLD AUTO: 64.9 % — SIGNIFICANT CHANGE UP (ref 43–77)
OVALOCYTES BLD QL SMEAR: SLIGHT — SIGNIFICANT CHANGE UP
PHOSPHATE SERPL-MCNC: 3.1 MG/DL — SIGNIFICANT CHANGE UP (ref 2.5–4.5)
PLAT MORPH BLD: ABNORMAL
PLATELET # BLD AUTO: 273 K/UL — SIGNIFICANT CHANGE UP (ref 150–400)
POTASSIUM SERPL-MCNC: 3.3 MMOL/L — LOW (ref 3.5–5.3)
POTASSIUM SERPL-SCNC: 3.3 MMOL/L — LOW (ref 3.5–5.3)
PROCALCITONIN SERPL-MCNC: 0.2 NG/ML — HIGH (ref 0.02–0.1)
RBC # BLD: 4.86 M/UL — SIGNIFICANT CHANGE UP (ref 4.2–5.8)
RBC # FLD: 11.9 % — SIGNIFICANT CHANGE UP (ref 10.3–14.5)
RBC BLD AUTO: ABNORMAL
SMUDGE CELLS # BLD: PRESENT — SIGNIFICANT CHANGE UP
SODIUM SERPL-SCNC: 138 MMOL/L — SIGNIFICANT CHANGE UP (ref 135–145)
STRONGYLOIDES AB SER-ACNC: NEGATIVE — SIGNIFICANT CHANGE UP
TOXOCARA ANTIBODY IGG RESULT: 1 U — SIGNIFICANT CHANGE UP
WBC # BLD: 11.5 K/UL — HIGH (ref 3.8–10.5)
WBC # FLD AUTO: 11.5 K/UL — HIGH (ref 3.8–10.5)

## 2024-09-19 PROCEDURE — 99233 SBSQ HOSP IP/OBS HIGH 50: CPT

## 2024-09-19 PROCEDURE — 71045 X-RAY EXAM CHEST 1 VIEW: CPT | Mod: 26

## 2024-09-19 PROCEDURE — 99233 SBSQ HOSP IP/OBS HIGH 50: CPT | Mod: GC

## 2024-09-19 PROCEDURE — 99418 PROLNG IP/OBS E/M EA 15 MIN: CPT

## 2024-09-19 RX ORDER — VALSARTAN 320 MG/1
40 TABLET ORAL EVERY 12 HOURS
Refills: 0 | Status: DISCONTINUED | OUTPATIENT
Start: 2024-09-19 | End: 2024-09-20

## 2024-09-19 RX ORDER — METOPROLOL TARTRATE 50 MG
12.5 TABLET ORAL EVERY 12 HOURS
Refills: 0 | Status: DISCONTINUED | OUTPATIENT
Start: 2024-09-19 | End: 2024-09-19

## 2024-09-19 RX ORDER — MAGNESIUM SULFATE 500 MG/ML
2 VIAL (ML) INJECTION ONCE
Refills: 0 | Status: COMPLETED | OUTPATIENT
Start: 2024-09-19 | End: 2024-09-19

## 2024-09-19 RX ORDER — METOPROLOL TARTRATE 50 MG
12.5 TABLET ORAL EVERY 12 HOURS
Refills: 0 | Status: DISCONTINUED | OUTPATIENT
Start: 2024-09-19 | End: 2024-09-20

## 2024-09-19 RX ORDER — FUROSEMIDE 10 MG/ML
20 INJECTION INTRAVENOUS DAILY
Refills: 0 | Status: DISCONTINUED | OUTPATIENT
Start: 2024-09-19 | End: 2024-09-19

## 2024-09-19 RX ORDER — PIPERACILLIN SODIUM AND TAZOBACTAM SODIUM 12; 1.5 G/60ML; G/60ML
4.5 INJECTION, POWDER, LYOPHILIZED, FOR SOLUTION INTRAVENOUS EVERY 8 HOURS
Refills: 0 | Status: DISCONTINUED | OUTPATIENT
Start: 2024-09-19 | End: 2024-09-19

## 2024-09-19 RX ORDER — BENZOCAINE .06; .7 ML/1; ML/1
0 SWAB TOPICAL
Qty: 100 | Refills: 1
Start: 2024-09-19

## 2024-09-19 RX ORDER — INSULIN GLARGINE 300 U/ML
22 INJECTION, SOLUTION SUBCUTANEOUS AT BEDTIME
Refills: 0 | Status: DISCONTINUED | OUTPATIENT
Start: 2024-09-19 | End: 2024-09-23

## 2024-09-19 RX ORDER — FUROSEMIDE 10 MG/ML
20 INJECTION INTRAVENOUS DAILY
Refills: 0 | Status: DISCONTINUED | OUTPATIENT
Start: 2024-09-19 | End: 2024-09-23

## 2024-09-19 RX ORDER — PREDNISONE 5 MG/1
40 TABLET ORAL DAILY
Refills: 0 | Status: DISCONTINUED | OUTPATIENT
Start: 2024-09-19 | End: 2024-09-20

## 2024-09-19 RX ADMIN — INSULIN GLARGINE 22 UNIT(S): 300 INJECTION, SOLUTION SUBCUTANEOUS at 22:14

## 2024-09-19 RX ADMIN — Medication 12.5 MILLIGRAM(S): at 18:20

## 2024-09-19 RX ADMIN — Medication 11 UNIT(S): at 07:46

## 2024-09-19 RX ADMIN — PANTOPRAZOLE SODIUM 40 MILLIGRAM(S): 40 TABLET, DELAYED RELEASE ORAL at 06:39

## 2024-09-19 RX ADMIN — Medication 25 GRAM(S): at 07:50

## 2024-09-19 RX ADMIN — Medication 1 DROP(S): at 11:53

## 2024-09-19 RX ADMIN — PREDNISONE 40 MILLIGRAM(S): 5 TABLET ORAL at 14:26

## 2024-09-19 RX ADMIN — Medication 40 MILLIEQUIVALENT(S): at 07:50

## 2024-09-19 RX ADMIN — Medication 7500 UNIT(S): at 22:14

## 2024-09-19 RX ADMIN — Medication 11 UNIT(S): at 18:16

## 2024-09-19 RX ADMIN — Medication 1 DROP(S): at 06:38

## 2024-09-19 RX ADMIN — Medication 40 MILLIEQUIVALENT(S): at 14:26

## 2024-09-19 RX ADMIN — VALSARTAN 20 MILLIGRAM(S): 320 TABLET ORAL at 06:39

## 2024-09-19 RX ADMIN — ATORVASTATIN CALCIUM 40 MILLIGRAM(S): 10 TABLET, FILM COATED ORAL at 22:14

## 2024-09-19 RX ADMIN — FUROSEMIDE 20 MILLIGRAM(S): 10 INJECTION INTRAVENOUS at 13:50

## 2024-09-19 RX ADMIN — Medication 500000 UNIT(S): at 18:19

## 2024-09-19 RX ADMIN — Medication 11 UNIT(S): at 11:52

## 2024-09-19 RX ADMIN — Medication 2: at 18:16

## 2024-09-19 RX ADMIN — Medication 7500 UNIT(S): at 13:51

## 2024-09-19 RX ADMIN — Medication 7500 UNIT(S): at 06:39

## 2024-09-19 RX ADMIN — VALSARTAN 40 MILLIGRAM(S): 320 TABLET ORAL at 18:18

## 2024-09-19 RX ADMIN — Medication 500000 UNIT(S): at 13:50

## 2024-09-19 NOTE — PROGRESS NOTE ADULT - PROBLEM SELECTOR PLAN 3
Per chart review, home lisinopril 40mg and HCTZ 25mg but has not taken for past 2 months, however unsure if patient has been taking medications at home   Now Off pressor support   Map: 96    Plan   - start valsartan 20mg BID with hold parameters  - lopressor 12.5mg BID  - Goal MAP > 65 Per chart review, home lisinopril 40mg and HCTZ 25mg but has not taken for past 2 months, however unsure if patient has been taking medications at home   Now Off pressor support   Map: 96    Plan   - c/w valsartan 20mg BID with hold parameters  - lopressor 12.5mg BID  - Goal MAP > 65

## 2024-09-19 NOTE — PROGRESS NOTE ADULT - SUBJECTIVE AND OBJECTIVE BOX
*******************INCOMPLETE**************    SUBJECTIVE / INTERVAL HPI: Patient was seen and examined this morning.     Overnight events:    CAPILLARY BLOOD GLUCOSE & INSULIN RECEIVED  135 mg/dL (09-17 @ 22:05)  153 mg/dL (09-18 @ 06:14)  142 mg/dL (09-18 @ 07:21)  180 mg/dL (09-18 @ 11:27)  227 mg/dL (09-18 @ 16:26)  164 mg/dL (09-18 @ 21:48)  98 mg/dL (09-19 @ 06:13)      REVIEW OF SYSTEMS  Constitutional:  Negative fever, chills or loss of appetite.  Eyes:  Negative blurry vision or double vision.  Cardiovascular:  Negative for chest pain or palpitations.  Respiratory:  Negative for cough, wheezing, or shortness of breath.    Gastrointestinal:  Negative for nausea, vomiting, diarrhea, constipation, or abdominal pain.  Genitourinary:  Negative frequency, urgency or dysuria.  Neurologic:  No headache, confusion, dizziness, lightheadedness.    PHYSICAL EXAM  Vital Signs Last 24 Hrs  T(C): 36.6 (19 Sep 2024 05:23), Max: 37.5 (19 Sep 2024 01:00)  T(F): 97.8 (19 Sep 2024 05:23), Max: 99.5 (19 Sep 2024 01:00)  HR: 80 (19 Sep 2024 08:00) (70 - 90)  BP: 139/83 (19 Sep 2024 08:00) (115/67 - 140/93)  BP(mean): 106 (19 Sep 2024 08:00) (86 - 112)  RR: 16 (19 Sep 2024 08:00) (16 - 20)  SpO2: 94% (19 Sep 2024 08:00) (91% - 96%)    Parameters below as of 19 Sep 2024 08:00  Patient On (Oxygen Delivery Method): nasal cannula w/ humidification  O2 Flow (L/min): 6      Constitutional: Awake, alert, in no acute distress.   HEENT: Normocephalic, atraumatic, BONNIE.  Respiratory: Lungs clear to ausculation bilaterally.   Cardiovascular: regular rhythm, normal S1 and S2, no audible murmurs.   GI: soft, non-tender, non-distended, bowel sounds present.  Extremities: No lower extremity edema.  Psychiatric: AAO x 3. Normal affect/mood.     LABS  CBC - WBC/HGB/HTC/PLT: 11.50/13.8/40.2/273 (09-19-24)  BMP - Na/K/Cl/Bicarb/BUN/Cr/Gluc/AG/eGFR: 138/3.3/104/25/28/1.14/116/9/84 (09-19-24)  Ca - 8.1 (09-19-24)  Phos - 3.1 (09-19-24)  Mg - 1.7 (09-19-24)  LFT - Alb/Tprot/Tbili/Dbili/AlkPhos/ALT/AST: 2.7/--/0.6/--/70/18/12 (09-18-24)  PT/aPTT/INR: 12.2/24.7/1.07 (09-14-24)   Thyroid Stimulating Hormone, Serum: 1.100 (09-17-24)        09-18-24 @ 07:01  -  09-19-24 @ 07:00  --------------------------------------------------------  IN: 0 mL / OUT: 1400 mL / NET: -1400 mL        MEDICATIONS  MEDICATIONS  (STANDING):  artificial  tears Solution 1 Drop(s) Both EYES four times a day  atorvastatin 40 milliGRAM(s) Oral at bedtime  chlorhexidine 2% Cloths 1 Application(s) Topical <User Schedule>  dextrose 5%. 1000 milliLiter(s) (100 mL/Hr) IV Continuous <Continuous>  dextrose 5%. 1000 milliLiter(s) (50 mL/Hr) IV Continuous <Continuous>  dextrose 50% Injectable 12.5 Gram(s) IV Push once  dextrose 50% Injectable 25 Gram(s) IV Push once  dextrose 50% Injectable 25 Gram(s) IV Push once  glucagon  Injectable 1 milliGRAM(s) IntraMuscular once  heparin   Injectable 7500 Unit(s) SubCutaneous every 8 hours  insulin glargine Injectable (LANTUS) 26 Unit(s) SubCutaneous at bedtime  insulin lispro (ADMELOG) corrective regimen sliding scale   SubCutaneous three times a day before meals  insulin lispro (ADMELOG) corrective regimen sliding scale   SubCutaneous at bedtime  insulin lispro Injectable (ADMELOG) 11 Unit(s) SubCutaneous three times a day before meals  methylPREDNISolone sodium succinate Injectable 60 milliGRAM(s) IV Push daily  pantoprazole    Tablet 40 milliGRAM(s) Oral before breakfast  potassium chloride   Powder 40 milliEquivalent(s) Oral once  sodium phosphate 15 milliMole(s)/250 mL IVPB 15 milliMole(s) IV Intermittent once  spironolactone 25 milliGRAM(s) Oral daily  valsartan 20 milliGRAM(s) Oral every 12 hours    MEDICATIONS  (PRN):  dextrose Oral Gel 15 Gram(s) Oral once PRN Blood Glucose LESS THAN 70 milliGRAM(s)/deciliter    ASSESSMENT / RECOMMENDATIONS  Pt is a 38-year-old male hypertension, DM2, HLD, hepatitis admitted for treatment of acute hypoxic respiratory failure 2/2 hypersensitivity pneumonitis vs eosinophilic pneumonia, course complicated by cardiogenic shock 2/2 new HF.  Endocrinology consulted for DM management    A1C: 11.6 %  BUN: 28  Creatinine: 1.14  GFR: 84  Weight: 109.7  BMI: 42.9    # Type 2 diabetes mellitus with hyperglycemia  - Please keep lantus  units at bedtime.   - keep lispro   units before each meal.  - Continue lispro moderate dose sliding scale before meals and at bedtime.  - Patient's fingerstick glucose goal is 100-180 mg/dL.    - Discharge recommendations to be discussed.   - Patient can follow up at discharge with [facility] by calling [phone] to make an appointment.      Case discussed with Dr. Brantley. Primary team updated.       *******************INCOMPLETE**************   *******************INCOMPLETE**************    SUBJECTIVE / INTERVAL HPI: Patient was seen and examined this morning. Pt reports feeling overall good this morning. Breathing feels okay on 2L. Last night he ate about half of the beef rigatoni. This AM, he ate cream of wheat, pancakes and two hard boiled eggs. For lunch he ate all of the pasta rigatoni and some vegetables.    Pt reports that he has a glucometer at home with strips. He feels comfortable checking at home on his own. He does not want a new glucometer. Understands the need to check FSG at home and to make endo f/u once discharged.    Overnight events: none    CAPILLARY BLOOD GLUCOSE & INSULIN RECEIVED  135 mg/dL (09-17 @ 22:05)  153 mg/dL (09-18 @ 06:14)  142 mg/dL (09-18 @ 07:21)  180 mg/dL (09-18 @ 11:27)  227 mg/dL (09-18 @ 16:26)  164 mg/dL (09-18 @ 21:48)  98 mg/dL (09-19 @ 06:13)      REVIEW OF SYSTEMS  Constitutional:  Negative fever, chills or loss of appetite.  Eyes:  Negative blurry vision or double vision.  Cardiovascular:  Negative for chest pain or palpitations.  Respiratory:  Negative for cough, wheezing, or shortness of breath.    Gastrointestinal:  Negative for nausea, vomiting, diarrhea, constipation, or abdominal pain.  Genitourinary:  Negative frequency, urgency or dysuria.  Neurologic:  No headache, confusion, dizziness, lightheadedness.    PHYSICAL EXAM  Vital Signs Last 24 Hrs  T(C): 36.6 (19 Sep 2024 05:23), Max: 37.5 (19 Sep 2024 01:00)  T(F): 97.8 (19 Sep 2024 05:23), Max: 99.5 (19 Sep 2024 01:00)  HR: 80 (19 Sep 2024 08:00) (70 - 90)  BP: 139/83 (19 Sep 2024 08:00) (115/67 - 140/93)  BP(mean): 106 (19 Sep 2024 08:00) (86 - 112)  RR: 16 (19 Sep 2024 08:00) (16 - 20)  SpO2: 94% (19 Sep 2024 08:00) (91% - 96%)    Parameters below as of 19 Sep 2024 08:00  Patient On (Oxygen Delivery Method): nasal cannula w/ humidification  O2 Flow (L/min): 6      Constitutional: Awake, alert, in no acute distress, sitting in chair comfortable  HEENT: Normocephalic, atraumatic, BONNIE.  Respiratory: Lungs clear to ausculation bilaterally.   Cardiovascular: regular rhythm, normal S1 and S2, no audible murmurs.   GI: soft, non-tender, non-distended, bowel sounds present.  Extremities: No lower extremity edema.  Psychiatric: AAO x 3. Normal affect/mood.     LABS  CBC - WBC/HGB/HTC/PLT: 11.50/13.8/40.2/273 (09-19-24)  BMP - Na/K/Cl/Bicarb/BUN/Cr/Gluc/AG/eGFR: 138/3.3/104/25/28/1.14/116/9/84 (09-19-24)  Ca - 8.1 (09-19-24)  Phos - 3.1 (09-19-24)  Mg - 1.7 (09-19-24)  LFT - Alb/Tprot/Tbili/Dbili/AlkPhos/ALT/AST: 2.7/--/0.6/--/70/18/12 (09-18-24)  PT/aPTT/INR: 12.2/24.7/1.07 (09-14-24)   Thyroid Stimulating Hormone, Serum: 1.100 (09-17-24)        09-18-24 @ 07:01  -  09-19-24 @ 07:00  --------------------------------------------------------  IN: 0 mL / OUT: 1400 mL / NET: -1400 mL        MEDICATIONS  MEDICATIONS  (STANDING):  artificial  tears Solution 1 Drop(s) Both EYES four times a day  atorvastatin 40 milliGRAM(s) Oral at bedtime  chlorhexidine 2% Cloths 1 Application(s) Topical <User Schedule>  dextrose 5%. 1000 milliLiter(s) (100 mL/Hr) IV Continuous <Continuous>  dextrose 5%. 1000 milliLiter(s) (50 mL/Hr) IV Continuous <Continuous>  dextrose 50% Injectable 12.5 Gram(s) IV Push once  dextrose 50% Injectable 25 Gram(s) IV Push once  dextrose 50% Injectable 25 Gram(s) IV Push once  glucagon  Injectable 1 milliGRAM(s) IntraMuscular once  heparin   Injectable 7500 Unit(s) SubCutaneous every 8 hours  insulin glargine Injectable (LANTUS) 26 Unit(s) SubCutaneous at bedtime  insulin lispro (ADMELOG) corrective regimen sliding scale   SubCutaneous three times a day before meals  insulin lispro (ADMELOG) corrective regimen sliding scale   SubCutaneous at bedtime  insulin lispro Injectable (ADMELOG) 11 Unit(s) SubCutaneous three times a day before meals  methylPREDNISolone sodium succinate Injectable 60 milliGRAM(s) IV Push daily  pantoprazole    Tablet 40 milliGRAM(s) Oral before breakfast  potassium chloride   Powder 40 milliEquivalent(s) Oral once  sodium phosphate 15 milliMole(s)/250 mL IVPB 15 milliMole(s) IV Intermittent once  spironolactone 25 milliGRAM(s) Oral daily  valsartan 20 milliGRAM(s) Oral every 12 hours    MEDICATIONS  (PRN):  dextrose Oral Gel 15 Gram(s) Oral once PRN Blood Glucose LESS THAN 70 milliGRAM(s)/deciliter    ASSESSMENT / RECOMMENDATIONS  Pt is a 38-year-old male hypertension, DM2, HLD, hepatitis admitted for treatment of acute hypoxic respiratory failure 2/2 hypersensitivity pneumonitis vs eosinophilic pneumonia, course complicated by cardiogenic shock 2/2 new HF.  Endocrinology consulted for DM management    A1C: 11.6 %  BUN: 28  Creatinine: 1.14  GFR: 84  Weight: 109.7  BMI: 42.9    # Type 2 diabetes mellitus with hyperglycemia  Pt is going for cardiac CT/MRI tomorrow 9/20 and will be NPO after MN   - Please keep lantus  units at bedtime.   - keep lispro   units before each meal.  - Continue lispro moderate dose sliding scale before meals and at bedtime.  - Patient's fingerstick glucose goal is 100-180 mg/dL.    - Discharge recommendations to be discussed.   - Patient can follow up at discharge with [facility] by calling [phone] to make an appointment.      Case discussed with Dr. Brantley. Primary team updated.       *******************INCOMPLETE**************   SUBJECTIVE / INTERVAL HPI: Patient was seen and examined this morning. Pt reports feeling overall good this morning. Breathing feels okay on 2L. Last night he ate about half of the beef rigatoni. This AM, he ate cream of wheat, pancakes and two hard boiled eggs. For lunch he ate all of the pasta rigatoni and some vegetables.    Pt reports that he has a glucometer at home with strips. He feels comfortable checking at home on his own. He does not want a new glucometer. Understands the need to check FSG at home and to make endo f/u once discharged.    Overnight events: none    CAPILLARY BLOOD GLUCOSE & INSULIN RECEIVED  135 mg/dL (09-17 @ 22:05)  153 mg/dL (09-18 @ 06:14)  142 mg/dL (09-18 @ 07:21)  180 mg/dL (09-18 @ 11:27)  227 mg/dL (09-18 @ 16:26)  164 mg/dL (09-18 @ 21:48)  98 mg/dL (09-19 @ 06:13)      REVIEW OF SYSTEMS  Constitutional:  Negative fever, chills or loss of appetite.  Eyes:  Negative blurry vision or double vision.  Cardiovascular:  Negative for chest pain or palpitations.  Respiratory:  Negative for cough, wheezing, or shortness of breath.    Gastrointestinal:  Negative for nausea, vomiting, diarrhea, constipation, or abdominal pain.  Genitourinary:  Negative frequency, urgency or dysuria.  Neurologic:  No headache, confusion, dizziness, lightheadedness.    PHYSICAL EXAM  Vital Signs Last 24 Hrs  T(C): 36.6 (19 Sep 2024 05:23), Max: 37.5 (19 Sep 2024 01:00)  T(F): 97.8 (19 Sep 2024 05:23), Max: 99.5 (19 Sep 2024 01:00)  HR: 80 (19 Sep 2024 08:00) (70 - 90)  BP: 139/83 (19 Sep 2024 08:00) (115/67 - 140/93)  BP(mean): 106 (19 Sep 2024 08:00) (86 - 112)  RR: 16 (19 Sep 2024 08:00) (16 - 20)  SpO2: 94% (19 Sep 2024 08:00) (91% - 96%)    Parameters below as of 19 Sep 2024 08:00  Patient On (Oxygen Delivery Method): nasal cannula w/ humidification  O2 Flow (L/min): 6      Constitutional: Awake, alert, in no acute distress, sitting in chair comfortable  HEENT: Normocephalic, atraumatic, BONNIE.  Respiratory: Lungs clear to ausculation bilaterally.   Cardiovascular: regular rhythm, normal S1 and S2, no audible murmurs.   GI: soft, non-tender, non-distended, bowel sounds present.  Extremities: No lower extremity edema.  Psychiatric: AAO x 3. Normal affect/mood.     LABS  CBC - WBC/HGB/HTC/PLT: 11.50/13.8/40.2/273 (09-19-24)  BMP - Na/K/Cl/Bicarb/BUN/Cr/Gluc/AG/eGFR: 138/3.3/104/25/28/1.14/116/9/84 (09-19-24)  Ca - 8.1 (09-19-24)  Phos - 3.1 (09-19-24)  Mg - 1.7 (09-19-24)  LFT - Alb/Tprot/Tbili/Dbili/AlkPhos/ALT/AST: 2.7/--/0.6/--/70/18/12 (09-18-24)  PT/aPTT/INR: 12.2/24.7/1.07 (09-14-24)   Thyroid Stimulating Hormone, Serum: 1.100 (09-17-24)        09-18-24 @ 07:01  -  09-19-24 @ 07:00  --------------------------------------------------------  IN: 0 mL / OUT: 1400 mL / NET: -1400 mL        MEDICATIONS  MEDICATIONS  (STANDING):  artificial  tears Solution 1 Drop(s) Both EYES four times a day  atorvastatin 40 milliGRAM(s) Oral at bedtime  chlorhexidine 2% Cloths 1 Application(s) Topical <User Schedule>  dextrose 5%. 1000 milliLiter(s) (100 mL/Hr) IV Continuous <Continuous>  dextrose 5%. 1000 milliLiter(s) (50 mL/Hr) IV Continuous <Continuous>  dextrose 50% Injectable 12.5 Gram(s) IV Push once  dextrose 50% Injectable 25 Gram(s) IV Push once  dextrose 50% Injectable 25 Gram(s) IV Push once  glucagon  Injectable 1 milliGRAM(s) IntraMuscular once  heparin   Injectable 7500 Unit(s) SubCutaneous every 8 hours  insulin glargine Injectable (LANTUS) 26 Unit(s) SubCutaneous at bedtime  insulin lispro (ADMELOG) corrective regimen sliding scale   SubCutaneous three times a day before meals  insulin lispro (ADMELOG) corrective regimen sliding scale   SubCutaneous at bedtime  insulin lispro Injectable (ADMELOG) 11 Unit(s) SubCutaneous three times a day before meals  methylPREDNISolone sodium succinate Injectable 60 milliGRAM(s) IV Push daily  pantoprazole    Tablet 40 milliGRAM(s) Oral before breakfast  potassium chloride   Powder 40 milliEquivalent(s) Oral once  sodium phosphate 15 milliMole(s)/250 mL IVPB 15 milliMole(s) IV Intermittent once  spironolactone 25 milliGRAM(s) Oral daily  valsartan 20 milliGRAM(s) Oral every 12 hours    MEDICATIONS  (PRN):  dextrose Oral Gel 15 Gram(s) Oral once PRN Blood Glucose LESS THAN 70 milliGRAM(s)/deciliter    ASSESSMENT / RECOMMENDATIONS  Pt is a 38-year-old male hypertension, DM2, HLD, hepatitis admitted for treatment of acute hypoxic respiratory failure 2/2 hypersensitivity pneumonitis vs eosinophilic pneumonia, course complicated by cardiogenic shock 2/2 new HF.  Endocrinology consulted for DM management    A1C: 11.6 %  BUN: 28  Creatinine: 1.14  GFR: 84  Weight: 109.7  BMI: 42.9    # Type 2 diabetes mellitus with hyperglycemia  Pt is going for cardiac CT/MRI tomorrow 9/20 and will be NPO 1hr prior.  - Please keep 22 lantus  units at bedtime.   - keep lispro 11  units before each meal  - f/u C-peptide  - Continue lispro moderate dose sliding scale before meals and at bedtime.  - Patient's fingerstick glucose goal is 100-180 mg/dL.    - Discharge recommendations to be discussed.   - Patient can follow up at discharge with [facility] by calling [phone] to make an appointment.      Case discussed with Dr. Brantley. Primary team updated.       *******************INCOMPLETE**************

## 2024-09-19 NOTE — DISCHARGE NOTE PROVIDER - DETAILS OF MALNUTRITION DIAGNOSIS/DIAGNOSES
This patient has been assessed with a concern for Malnutrition and was treated during this hospitalization for the following Nutrition diagnosis/diagnoses:     -  09/16/2024: Morbid obesity (BMI > 40)

## 2024-09-19 NOTE — PROGRESS NOTE ADULT - ASSESSMENT
38-year-old male hypertension, DM2, HLD, hepatitis admitted for treatment of acute hypoxic respiratory failure 2/2 hypersensitivity pneumonitis vs eosinophilic pneumonia, course complicated by cardiogenic shock, now of pressor support, stepped down to medicine telemetry for further management     NEURO  Extubated 9/15    CV  #cardiogenic shock - RESOLVED   While at [facility] patient was found  to have EF 25% on TTE  with global left ventricular hypokinesis requiring  levophed and dobutamine,   Tddx include ischemic cardiomyopathy vs eosinophilic myocardiosis (given high suspension for eosinophilic pneumonia after improvement with IV steroids) vs alcohol induced cardiomyopathy (given history of alcohol use).   POCUS on admission 9/14 showed moderate to severe LV dysfunction with global hypokinesis  Now off pressor support. A-line removed.   Plan  - Cardiology following, f/u recs  - Repeat TTE on 9/16, 40-45%, improved reported TTE at [place] 25%.   - Per Cardiology: d/c Hydral 10mg, Isosorbide 5mg; start valsartan 20mg BID with hold parameters  - Lasix 20mg IVP  - Goal MAP >65    #HTN  Per chart review, home lisinopril 40mg and HCTZ 25mg but has not taken for past 2 months, however unsure if patient has been taking medications at home   Now Off pressor support   Map: 96  Plan   - start valsartan 20mg BID with hold parameters  - Goal MAP > 65       PULM  #acute hypoxic respiratory failure  Possibly 2/2 hypersensitivity pneumonitis vs eosinophilic pneumonia  Patient  initially presented to [facility] on 9/10 for SOB, PERRY, cough x 2 weeks. Found to have increasing O2 requirements initially placed on NC > HFNC > BiPAP, eventually requiring intubation, was proned 9/12-9/13 and paralyzed per ARDS protocol  CT- chest significant for Multi- focal pneumonia.   Of  note patient was treated with steroids, was noted to improve rapidly after treatment, which was highly concerning for hypersensitivity pneumonitis vs eosinophilic pneumonia  Patient was treated with CTX 9/10-9/14, Azithro 9/10-9/12, Bactrim 9/11-9/12, iv solumedrol 9/11-9/14 at OSH. Noted improvement upon admission to [facility] ICU s/p solumedrol, making hypersensitivity pneumonitis vs eosinophilic pneumonia the leading differential given treatment is the same.   s/p Bronchoscopy 9/13.   Repeat CT- chest with improved bilateral infiltrates  Fungitell, c-ANCA, p-ANCA, Atypical ANCA, RF, CCP, DS DNA, Anti-López Ab, Anti-Ribonuclear Protein, Anti Solange-1, Scleroderma Ab, Anti SS-A Ab, Anti SS-B Ab, ANCA Reflex MPO and PROT3 negative   Coxsackie A and B titers positive  Plan  - OOB, IS  - continue zosyn 4.5 g q 12 end date 9/19  - c/w methylprednisolone 50mg IV BID  - c/w bactrim PCP prophylaxis  - Needs pulmonology outpatient follow up in [place]    RENAL  #DIANDRA  Hess exchanged 9/14  At OSH noted to have increase in BUN and Cr with concern for anuria, making urine at H   Suspect prerenal in setting of cardiogenic shock with possibly septic shock   Cr. 3.49 ----> 2.70 ---> 1.17  Urine Lytes 9/15: Na<20, Cr:120, Urea: 647, Osm: 396   Plan  - strict is/os  - trend BMP    GI  Extubated, carb consistent diet      ENDO  #DM  Poorly controlled   A1c 12% during [place] Admission   Per chart review, Home metformin 1000mg BID but has not taken for past 2 months  - discontinue insulin gtt, no indication for DKA  - c/w hISS  - c/w basal insulin (26U Lantus bedtime)  - c/w lispro 11U premeal  - FSG q4hrs    #HLD  Per chart review, on a statin (unknown which one) but not taking past 2 months.  - holding home meds    ID  HATTIE    HEME/ONC  #DVT prophylaxis   - heparin subq  - maintain active T&S  - transfuse if Hgb <7    PREVENTIVE   F: as needed  E: Replete if K<4, Mg<2  N: CCB  GI: PPI  DVT: heparin subq  DISPO: 7Lach   38-year-old male hypertension, DM2, HLD, hepatitis admitted for treatment of acute hypoxic respiratory failure 2/2 hypersensitivity pneumonitis vs eosinophilic pneumonia, course complicated by cardiogenic shock, now of pressor support, stepped down to medicine telemetry for further management of AHRF 2/2 possible hypersensitivity pneumonitis vs. eosinophilic pneumonia, currently on 2L, saturating 94-95%, now stable for RMF     NEURO  Extubated 9/15    CV  #cardiogenic shock - RESOLVED   While at [facility] patient was found  to have EF 25% on TTE  with global left ventricular hypokinesis requiring  levophed and dobutamine,   Tddx include ischemic cardiomyopathy vs eosinophilic myocardiosis (given high suspension for eosinophilic pneumonia after improvement with IV steroids) vs alcohol induced cardiomyopathy (given history of alcohol use).   POCUS on admission 9/14 showed moderate to severe LV dysfunction with global hypokinesis  Now off pressor support. A-line removed.   Plan  - Cardiology following, f/u recs  - Repeat TTE on 9/16, 40-45%, improved reported TTE at [place] 25%.   - Per Cardiology: d/c Hydral 10mg, Isosorbide 5mg; start valsartan 20mg BID with hold parameters  - Lasix 20mg IVP  - Goal MAP >65    #HTN  Per chart review, home lisinopril 40mg and HCTZ 25mg but has not taken for past 2 months, however unsure if patient has been taking medications at home   Now Off pressor support   Map: 96  Plan   - start valsartan 20mg BID with hold parameters  - Goal MAP > 65       PULM  #acute hypoxic respiratory failure  Possibly 2/2 hypersensitivity pneumonitis vs eosinophilic pneumonia  Patient  initially presented to [facility] on 9/10 for SOB, PERRY, cough x 2 weeks. Found to have increasing O2 requirements initially placed on NC > HFNC > BiPAP, eventually requiring intubation, was proned 9/12-9/13 and paralyzed per ARDS protocol  CT- chest significant for Multi- focal pneumonia.   Of  note patient was treated with steroids, was noted to improve rapidly after treatment, which was highly concerning for hypersensitivity pneumonitis vs eosinophilic pneumonia  Patient was treated with CTX 9/10-9/14, Azithro 9/10-9/12, Bactrim 9/11-9/12, iv solumedrol 9/11-9/14 at OSH. Noted improvement upon admission to [facility] ICU s/p solumedrol, making hypersensitivity pneumonitis vs eosinophilic pneumonia the leading differential given treatment is the same.   s/p Bronchoscopy 9/13.   Repeat CT- chest with improved bilateral infiltrates  Fungitell, c-ANCA, p-ANCA, Atypical ANCA, RF, CCP, DS DNA, Anti-López Ab, Anti-Ribonuclear Protein, Anti Solange-1, Scleroderma Ab, Anti SS-A Ab, Anti SS-B Ab, ANCA Reflex MPO and PROT3 negative   Coxsackie A and B titers positive  Plan  - OOB, IS  - continue zosyn 4.5 g q 12 end date 9/19  - c/w methylprednisolone 50mg IV BID  - c/w bactrim PCP prophylaxis  - Needs pulmonology outpatient follow up in [place]    RENAL  #DIANDRA  Hess exchanged 9/14  At OSH noted to have increase in BUN and Cr with concern for anuria, making urine at [facility]   Suspect prerenal in setting of cardiogenic shock with possibly septic shock   Cr. 3.49 ----> 2.70 ---> 1.17  Urine Lytes 9/15: Na<20, Cr:120, Urea: 647, Osm: 396   Plan  - strict is/os  - trend BMP    GI  Extubated, carb consistent diet      ENDO  #DM  Poorly controlled   A1c 12% during [place] Admission   Per chart review, Home metformin 1000mg BID but has not taken for past 2 months  - discontinue insulin gtt, no indication for DKA  - c/w hISS  - c/w basal insulin (26U Lantus bedtime)  - c/w lispro 11U premeal  - FSG q4hrs    #HLD  Per chart review, on a statin (unknown which one) but not taking past 2 months.  - holding home meds    ID  HATTIE    HEME/ONC  #DVT prophylaxis   - heparin subq  - maintain active T&S  - transfuse if Hgb <7    PREVENTIVE   F: as needed  E: Replete if K<4, Mg<2  N: CCB  GI: PPI  DVT: heparin subq  DISPO: 7Lach   38-year-old male hypertension, DM2, HLD, hepatitis admitted for treatment of acute hypoxic respiratory failure 2/2 hypersensitivity pneumonitis vs eosinophilic pneumonia, course complicated by cardiogenic shock, now of pressor support, stepped down to medicine telemetry for further management of AHRF 2/2 possible hypersensitivity pneumonitis vs. eosinophilic pneumonia, currently on 2L, saturating 94-95%, now stable for RMF     NEURO  Extubated 9/15    CV  #cardiogenic shock - RESOLVED   While at [facility] patient was found  to have EF 25% on TTE  with global left ventricular hypokinesis requiring  levophed and dobutamine,   Tddx include ischemic cardiomyopathy vs eosinophilic myocardiosis (given high suspension for eosinophilic pneumonia after improvement with IV steroids) vs alcohol induced cardiomyopathy (given history of alcohol use).   POCUS on admission 9/14 showed moderate to severe LV dysfunction with global hypokinesis  Now off pressor support. A-line removed.   Plan  - Cardiology following, f/u recs  - Repeat TTE on 9/16, 40-45%, improved reported TTE at [place] 25%.   -c/w valsartan 20mg BID with hold parameters  - c/w spironolactone 25mg qd  - c/w lipitor 40mg qhs  - Goal MAP >65    #HTN  Per chart review, home lisinopril 40mg and HCTZ 25mg but has not taken for past 2 months, however unsure if patient has been taking medications at home   Now Off pressor support   Map: 96  Plan   - start valsartan 20mg BID with hold parameters  - Goal MAP > 65       PULM  #acute hypoxic respiratory failure  Possibly 2/2 hypersensitivity pneumonitis vs eosinophilic pneumonia  Patient  initially presented to [facility] on 9/10 for SOB, PERRY, cough x 2 weeks. Found to have increasing O2 requirements initially placed on NC > HFNC > BiPAP, eventually requiring intubation, was proned 9/12-9/13 and paralyzed per ARDS protocol  CT- chest significant for Multi- focal pneumonia.   Of  note patient was treated with steroids, was noted to improve rapidly after treatment, which was highly concerning for hypersensitivity pneumonitis vs eosinophilic pneumonia  Patient was treated with CTX 9/10-9/14, Azithro 9/10-9/12, Bactrim 9/11-9/12, iv solumedrol 9/11-9/14 at OSH. Noted improvement upon admission to [facility] ICU s/p solumedrol, making hypersensitivity pneumonitis vs eosinophilic pneumonia the leading differential given treatment is the same.   s/p Bronchoscopy 9/13. ; s/p Zosyn course 9/19  Repeat CT- chest with improved bilateral infiltrates  Fungitell, c-ANCA, p-ANCA, Atypical ANCA, RF, CCP, DS DNA, Anti-López Ab, Anti-Ribonuclear Protein, Anti Solange-1, Scleroderma Ab, Anti SS-A Ab, Anti SS-B Ab, ANCA Reflex MPO and PROT3 negative   Coxsackie A and B titers positive  IMPROVING, on 2L NC with 94-95 sat  Plan  - OOB, IS  - c/w methylprednisolone 60mg qd  - wean oxygen as tolerated  - Needs pulmonology outpatient follow up in [place]    RENAL  #DIANDRA, resolved  Hess exchanged 9/14  At OSH noted to have increase in BUN and Cr with concern for anuria, making urine at [facility]   Suspect prerenal in setting of cardiogenic shock with possibly septic shock   Cr. 3.49 ----> 2.70 ---> 1.17 -> 1.14  Urine Lytes 9/15: Na<20, Cr:120, Urea: 647, Osm: 396   Plan  - trend BMP    GI  Extubated, carb consistent diet    ENDO  #DM  Poorly controlled   A1c 12% during [place] Admission   Per chart review, Home metformin 1000mg BID but has not taken for past 2 months  - discontinue insulin gtt, no indication for DKA  - c/w hISS  - c/w basal insulin (26U Lantus bedtime)  - c/w lispro 11U premeal  - FSG q4hrs    #HLD  Per chart review, on a statin (unknown which one) but not taking past 2 months.  - on lipitor 40mg qhs    ID  HATTIE    HEME/ONC  #DVT prophylaxis   - heparin subq  - maintain active T&S  - transfuse if Hgb <7    PREVENTIVE   F: as needed  E: Replete if K<4, Mg<2  N: CCB  GI: PPI  DVT: heparin subq  DISPO: RMF   38-year-old male hypertension, DM2, HLD, hepatitis admitted for treatment of acute hypoxic respiratory failure 2/2 hypersensitivity pneumonitis vs eosinophilic pneumonia, course complicated by cardiogenic shock, now of pressor support, stepped down to medicine telemetry for further management of AHRF 2/2 possible hypersensitivity pneumonitis vs. eosinophilic pneumonia, currently on 2L, saturating 94-95%, now stable for RMF     NEURO  Extubated 9/15    CV  #cardiogenic shock - RESOLVED   While at [facility] patient was found  to have EF 25% on TTE with global left ventricular hypokinesis requiring  levophed and dobutamine,   Tddx include ischemic cardiomyopathy vs eosinophilic myocardiosis (given high suspension for eosinophilic pneumonia after improvement with IV steroids) vs alcohol induced cardiomyopathy (given history of alcohol use).   POCUS on admission 9/14 showed moderate to severe LV dysfunction with global hypokinesis  Now off pressor support. A-line removed.   Plan  - Cardiology following, f/u recs  - Repeat TTE on 9/16, 40-45%, improved reported TTE at [place] 25%.   - increased valsartan to 40mg BID with hold parameters  - start lopressor 12.5mg BID  - start lasix 20mg qd  - c/w spironolactone 25mg qd  - c/w lipitor 40mg qhs  - NPO MN for possible MRI cardiac  - Goal MAP >65    #HTN  Per chart review, home lisinopril 40mg and HCTZ 25mg but has not taken for past 2 months, however unsure if patient has been taking medications at home   Now Off pressor support   Map: 96  Plan   - start valsartan 20mg BID with hold parameters  - lopressor 12.5mg BID  - Goal MAP > 65       PULM  #acute hypoxic respiratory failure  Possibly 2/2 hypersensitivity pneumonitis vs eosinophilic pneumonia  Patient  initially presented to [facility] on 9/10 for SOB, PERRY, cough x 2 weeks. Found to have increasing O2 requirements initially placed on NC > HFNC > BiPAP, eventually requiring intubation, was proned 9/12-9/13 and paralyzed per ARDS protocol  CT- chest significant for Multi- focal pneumonia.   Of  note patient was treated with steroids, was noted to improve rapidly after treatment, which was highly concerning for hypersensitivity pneumonitis vs eosinophilic pneumonia  Patient was treated with CTX 9/10-9/14, Azithro 9/10-9/12, Bactrim 9/11-9/12, iv solumedrol 9/11-9/14 at OSH. Noted improvement upon admission to [facility] ICU s/p solumedrol, making hypersensitivity pneumonitis vs eosinophilic pneumonia the leading differential given treatment is the same.   s/p Bronchoscopy 9/13. ; s/p Zosyn course 9/19  Repeat CT- chest with improved bilateral infiltrates  Fungitell, c-ANCA, p-ANCA, Atypical ANCA, RF, CCP, DS DNA, Anti-López Ab, Anti-Ribonuclear Protein, Anti Solange-1, Scleroderma Ab, Anti SS-A Ab, Anti SS-B Ab, ANCA Reflex MPO and PROT3 negative   Coxsackie A and B titers positive  IMPROVING, on 2L NC with 94-95 sat  Plan  - OOB, IS  - c/w methylprednisolone 60mg qd (plan to taper to prednisone 40mg tomorrow)  - wean oxygen as tolerated  - Needs pulmonology outpatient follow up in [place]    RENAL  #DIANDRA, resolved  Hess exchanged 9/14  At OSH noted to have increase in BUN and Cr with concern for anuria, making urine at [facility]   Suspect prerenal in setting of cardiogenic shock with possibly septic shock   Cr. 3.49 ----> 2.70 ---> 1.17 -> 1.14  Urine Lytes 9/15: Na<20, Cr:120, Urea: 647, Osm: 396   Plan  - trend BMP    GI  Extubated, carb consistent diet    ENDO  #DM  Poorly controlled   A1c 12% during [place] Admission   Per chart review, Home metformin 1000mg BID but has not taken for past 2 months  - discontinue insulin gtt, no indication for DKA  - c/w hISS  - c/w basal insulin (26U Lantus bedtime)  - c/w lispro 11U premeal  - FSG q4hrs    #HLD  Per chart review, on a statin (unknown which one) but not taking past 2 months.  - on lipitor 40mg qhs    ID  HATTIE    HEME/ONC  #DVT prophylaxis   - heparin subq  - maintain active T&S  - transfuse if Hgb <7    PREVENTIVE   F: as needed  E: Replete if K<4, Mg<2  N: CCB  GI: PPI  DVT: heparin subq  DISPO: RM   38-year-old male hypertension, DM2, HLD, hepatitis admitted for treatment of acute hypoxic respiratory failure 2/2 hypersensitivity pneumonitis vs eosinophilic pneumonia, course complicated by cardiogenic shock, now of pressor support, stepped down to medicine telemetry for further management of AHRF 2/2 possible hypersensitivity pneumonitis vs. eosinophilic pneumonia, currently on 2L, saturating 94-95%, now stable for RMF     NEURO  Extubated 9/15    CV  #cardiogenic shock - RESOLVED   While at [facility] patient was found  to have EF 25% on TTE with global left ventricular hypokinesis requiring  levophed and dobutamine,   Tddx include ischemic cardiomyopathy vs eosinophilic myocardiosis (given high suspension for eosinophilic pneumonia after improvement with IV steroids) vs alcohol induced cardiomyopathy (given history of alcohol use).   POCUS on admission 9/14 showed moderate to severe LV dysfunction with global hypokinesis  Now off pressor support. A-line removed.   Plan  - Cardiology following, f/u recs  - Repeat TTE on 9/16, 40-45%, improved reported TTE at [place] 25%.   - increased valsartan to 40mg BID with hold parameters  - start lopressor 12.5mg BID  - start lasix 20mg qd  - c/w spironolactone 25mg qd  - c/w lipitor 40mg qhs  - NPO MN for possible MRI cardiac  - Goal MAP >65    #HTN  Per chart review, home lisinopril 40mg and HCTZ 25mg but has not taken for past 2 months, however unsure if patient has been taking medications at home   Now Off pressor support   Map: 96  Plan   - start valsartan 20mg BID with hold parameters  - lopressor 12.5mg BID  - Goal MAP > 65       PULM  #acute hypoxic respiratory failure  Possibly 2/2 hypersensitivity pneumonitis vs eosinophilic pneumonia  Patient  initially presented to [facility] on 9/10 for SOB, PERRY, cough x 2 weeks. Found to have increasing O2 requirements initially placed on NC > HFNC > BiPAP, eventually requiring intubation, was proned 9/12-9/13 and paralyzed per ARDS protocol  CT- chest significant for Multi- focal pneumonia.   Of  note patient was treated with steroids, was noted to improve rapidly after treatment, which was highly concerning for hypersensitivity pneumonitis vs eosinophilic pneumonia  Patient was treated with CTX 9/10-9/14, Azithro 9/10-9/12, Bactrim 9/11-9/12, iv solumedrol 9/11-9/14 at OSH. Noted improvement upon admission to [facility] ICU s/p solumedrol, making hypersensitivity pneumonitis vs eosinophilic pneumonia the leading differential given treatment is the same.   s/p Bronchoscopy 9/13. ;   Repeat CT- chest with improved bilateral infiltrates  Fungitell, c-ANCA, p-ANCA, Atypical ANCA, RF, CCP, DS DNA, Anti-López Ab, Anti-Ribonuclear Protein, Anti Solange-1, Scleroderma Ab, Anti SS-A Ab, Anti SS-B Ab, ANCA Reflex MPO and PROT3 negative   Coxsackie A and B titers positive  IMPROVING, on 2L NC with 94-95 sat  Plan  - OOB, IS  - c/w methylprednisolone 60mg qd (plan to taper to prednisone 40mg tomorrow)  - c/w zosyn 3.375 q8h (total 7d course, EOD 9/22)  - wean oxygen as tolerated  - Needs pulmonology outpatient follow up in [place]    RENAL  #DIANDRA, resolved  Hess exchanged 9/14  At OSH noted to have increase in BUN and Cr with concern for anuria, making urine at [facility]   Suspect prerenal in setting of cardiogenic shock with possibly septic shock   Cr. 3.49 ----> 2.70 ---> 1.17 -> 1.14  Urine Lytes 9/15: Na<20, Cr:120, Urea: 647, Osm: 396   Plan  - trend BMP    GI  Extubated, carb consistent diet    ENDO  #DM  Poorly controlled   A1c 12% during [place] Admission   Per chart review, Home metformin 1000mg BID but has not taken for past 2 months  - discontinue insulin gtt, no indication for DKA  - c/w hISS  - c/w basal insulin (26U Lantus bedtime)  - c/w lispro 11U premeal  - FSG q4hrs    #HLD  Per chart review, on a statin (unknown which one) but not taking past 2 months.  - on lipitor 40mg qhs    ID  HATTIE    HEME/ONC  #DVT prophylaxis   - heparin subq  - maintain active T&S  - transfuse if Hgb <7    PREVENTIVE   F: as needed  E: Replete if K<4, Mg<2  N: CCB  GI: PPI  DVT: heparin subq  DISPO: RMF   38-year-old male hypertension, DM2, HLD, hepatitis admitted for treatment of acute hypoxic respiratory failure 2/2 hypersensitivity pneumonitis vs eosinophilic pneumonia, course complicated by cardiogenic shock, now of pressor support, stepped down to medicine telemetry for further management of AHRF 2/2 possible hypersensitivity pneumonitis vs. eosinophilic pneumonia, currently on 2L, saturating 94-95%, now stable for RMF     NEURO  Extubated 9/15    CV  #cardiogenic shock - RESOLVED   While at [facility] patient was found  to have EF 25% on TTE with global left ventricular hypokinesis requiring  levophed and dobutamine,   Tddx include ischemic cardiomyopathy vs eosinophilic myocardiosis (given high suspension for eosinophilic pneumonia after improvement with IV steroids) vs alcohol induced cardiomyopathy (given history of alcohol use).   POCUS on admission 9/14 showed moderate to severe LV dysfunction with global hypokinesis  Now off pressor support. A-line removed.   Plan  - Cardiology following, f/u recs  - Repeat TTE on 9/16, 40-45%, improved reported TTE at [place] 25%.   - increased valsartan to 40mg BID with hold parameters  - start lopressor 12.5mg BID  - start lasix 20mg qd  - c/w spironolactone 25mg qd  - c/w lipitor 40mg qhs  - NPO MN for possible MRI cardiac  - Goal MAP >65    #HTN  Per chart review, home lisinopril 40mg and HCTZ 25mg but has not taken for past 2 months, however unsure if patient has been taking medications at home   Now Off pressor support   Map: 96  Plan   - start valsartan 20mg BID with hold parameters  - lopressor 12.5mg BID  - Goal MAP > 65       PULM  #acute hypoxic respiratory failure  Possibly 2/2 hypersensitivity pneumonitis vs eosinophilic pneumonia  Patient  initially presented to [facility] on 9/10 for SOB, PERRY, cough x 2 weeks. Found to have increasing O2 requirements initially placed on NC > HFNC > BiPAP, eventually requiring intubation, was proned 9/12-9/13 and paralyzed per ARDS protocol  CT- chest significant for Multi- focal pneumonia.   Of  note patient was treated with steroids, was noted to improve rapidly after treatment, which was highly concerning for hypersensitivity pneumonitis vs eosinophilic pneumonia  Patient was treated with CTX 9/10-9/14, Azithro 9/10-9/12, Bactrim 9/11-9/12, iv solumedrol 9/11-9/14 at OSH. Noted improvement upon admission to [facility] ICU s/p solumedrol, making hypersensitivity pneumonitis vs eosinophilic pneumonia the leading differential given treatment is the same.   s/p Bronchoscopy 9/13; s/p zosyn 7d course (received half of it at [place])  Repeat CT- chest with improved bilateral infiltrates  Fungitell, c-ANCA, p-ANCA, Atypical ANCA, RF, CCP, DS DNA, Anti-López Ab, Anti-Ribonuclear Protein, Anti Solange-1, Scleroderma Ab, Anti SS-A Ab, Anti SS-B Ab, ANCA Reflex MPO and PROT3 negative   Coxsackie A and B titers positive  IMPROVING, on 2L NC with 94-95 sat  Plan  - OOB, IS  - c/w methylprednisolone 60mg qd (plan to taper to prednisone 40mg tomorrow)  - wean oxygen as tolerated  - Needs pulmonology outpatient follow up in [place]    RENAL  #DIANDRA, resolved  Hess exchanged 9/14  At OSH noted to have increase in BUN and Cr with concern for anuria, making urine at [facility]   Suspect prerenal in setting of cardiogenic shock with possibly septic shock   Cr. 3.49 ----> 2.70 ---> 1.17 -> 1.14  Urine Lytes 9/15: Na<20, Cr:120, Urea: 647, Osm: 396   Plan  - trend BMP    GI  Extubated, carb consistent diet    ENDO  #DM  Poorly controlled   A1c 12% during [place] Admission   Per chart review, Home metformin 1000mg BID but has not taken for past 2 months  - discontinue insulin gtt, no indication for DKA  - c/w hISS  - c/w basal insulin (26U Lantus bedtime)  - c/w lispro 11U premeal  - FSG q4hrs    #HLD  Per chart review, on a statin (unknown which one) but not taking past 2 months.  - on lipitor 40mg qhs    ID  HATTIE    HEME/ONC  #DVT prophylaxis   - heparin subq  - maintain active T&S  - transfuse if Hgb <7    PREVENTIVE   F: as needed  E: Replete if K<4, Mg<2  N: CCB  GI: PPI  DVT: heparin subq  DISPO: RMF   38-year-old male hypertension, DM2, HLD, hepatitis admitted for treatment of acute hypoxic respiratory failure 2/2 hypersensitivity pneumonitis vs eosinophilic pneumonia, course complicated by cardiogenic shock, now of pressor support, stepped down to medicine telemetry for further management of AHRF 2/2 possible hypersensitivity pneumonitis vs. eosinophilic pneumonia, currently on 2L, saturating 94-95%, now stable for RMF     NEURO  Extubated 9/15    CV  #cardiogenic shock - RESOLVED   While at [facility] patient was found  to have EF 25% on TTE with global left ventricular hypokinesis requiring  levophed and dobutamine,   Tddx include ischemic cardiomyopathy vs eosinophilic myocardiosis (given high suspension for eosinophilic pneumonia after improvement with IV steroids) vs alcohol induced cardiomyopathy (given history of alcohol use).   POCUS on admission 9/14 showed moderate to severe LV dysfunction with global hypokinesis  Now off pressor support. A-line removed.   Plan  - Cardiology following, f/u recs  - Repeat TTE on 9/16, 40-45%, improved reported TTE at [place] 25%.   - increased valsartan to 40mg BID with hold parameters  - start lopressor 12.5mg BID  - start lasix 20mg qd  - c/w spironolactone 25mg qd  - c/w lipitor 40mg qhs  - Cardiac MRI tomorrow  - Goal MAP >65    #HTN  Per chart review, home lisinopril 40mg and HCTZ 25mg but has not taken for past 2 months, however unsure if patient has been taking medications at home   Now Off pressor support   Map: 96  Plan   - start valsartan 20mg BID with hold parameters  - lopressor 12.5mg BID  - Goal MAP > 65       PULM  #acute hypoxic respiratory failure  Possibly 2/2 hypersensitivity pneumonitis vs eosinophilic pneumonia  Patient  initially presented to [facility] on 9/10 for SOB, PERRY, cough x 2 weeks. Found to have increasing O2 requirements initially placed on NC > HFNC > BiPAP, eventually requiring intubation, was proned 9/12-9/13 and paralyzed per ARDS protocol  CT- chest significant for Multi- focal pneumonia.   Of  note patient was treated with steroids, was noted to improve rapidly after treatment, which was highly concerning for hypersensitivity pneumonitis vs eosinophilic pneumonia  Patient was treated with CTX 9/10-9/14, Azithro 9/10-9/12, Bactrim 9/11-9/12, iv solumedrol 9/11-9/14 at OSH. Noted improvement upon admission to [facility] ICU s/p solumedrol, making hypersensitivity pneumonitis vs eosinophilic pneumonia the leading differential given treatment is the same.   s/p Bronchoscopy 9/13; s/p zosyn 7d course (received half of it at [place])  Repeat CT- chest with improved bilateral infiltrates  Fungitell, c-ANCA, p-ANCA, Atypical ANCA, RF, CCP, DS DNA, Anti-López Ab, Anti-Ribonuclear Protein, Anti Solange-1, Scleroderma Ab, Anti SS-A Ab, Anti SS-B Ab, ANCA Reflex MPO and PROT3 negative   Coxsackie A and B titers positive  IMPROVING, on 2L NC with 94-95 sat  Plan  - OOB, IS  - c/w methylprednisolone 60mg qd (plan to taper to prednisone 40mg tomorrow)  - wean oxygen as tolerated  - Needs pulmonology outpatient follow up in [place]    RENAL  #DIANDRA, resolved  Hess exchanged 9/14  At OSH noted to have increase in BUN and Cr with concern for anuria, making urine at [facility]   Suspect prerenal in setting of cardiogenic shock with possibly septic shock   Cr. 3.49 ----> 2.70 ---> 1.17 -> 1.14  Urine Lytes 9/15: Na<20, Cr:120, Urea: 647, Osm: 396   Plan  - trend BMP    GI  Extubated, carb consistent diet    ENDO  #DM  Poorly controlled   A1c 12% during [place] Admission   Per chart review, Home metformin 1000mg BID but has not taken for past 2 months  - discontinue insulin gtt, no indication for DKA  - c/w hISS  - c/w basal insulin (26U Lantus bedtime)  - c/w lispro 11U premeal  - FSG q4hrs    #HLD  Per chart review, on a statin (unknown which one) but not taking past 2 months.  - on lipitor 40mg qhs    ID  HATTIE    HEME/ONC  #DVT prophylaxis   - heparin subq  - maintain active T&S  - transfuse if Hgb <7    PREVENTIVE   F: as needed  E: Replete if K<4, Mg<2  N: CCB  GI: PPI  DVT: heparin subq  DISPO: RMF

## 2024-09-19 NOTE — PROGRESS NOTE ADULT - ATTENDING COMMENTS
The patient awaits Cardiac MRI. Glucose levels were 227-164 last night and today 116-142. I agree with treatment with 22 units Lantus Insulin and 11 units pre-meal Lispro Insulin.

## 2024-09-19 NOTE — DISCHARGE NOTE PROVIDER - NSDCFUSCHEDAPPT_GEN_ALL_CORE_FT
Aba Newman Physician [facility]  HEART[facility] [address]  Scheduled Appointment: 09/30/2024

## 2024-09-19 NOTE — DISCHARGE NOTE PROVIDER - NSDCCPCAREPLAN_GEN_ALL_CORE_FT
PRINCIPAL DISCHARGE DIAGNOSIS  Diagnosis: Acute hypoxic respiratory failure  Assessment and Plan of Treatment: Respiratory failure is a condition where you don’t have enough oxygen in the tissues in your body (hypoxia) or when you have too much carbon dioxide in your blood (hypercapnia). You might also hear people use the term “acute hypoxemic respiratory failure (AHRF)” to describe it.  Symptoms of respiratory failure depend on the cause. Symptoms may include: Shortness of breath or feeling like you can’t get enough air (dyspnea), rapid breathing (tachypnea), extreme tiredness (fatigue), fast heart rate (feeling like your heart’s racing) or heart palpitations, and spitting or coughing blood or bloody mucus (hemoptysis).  Respiratory failure is a very serious condition. Many people survive it, depending on what’s causing it, the severity and how quickly they’re treated. If you develop new or worsening shortness of breath, rapid breathing, increased heart rate, palpitations, or hemoptysis, please go to the emergency department.  We schedule a follow up appointment with Dr. Mtz, a lung doctor, in 1-2 weeks.        SECONDARY DISCHARGE DIAGNOSES  Diagnosis: Cardiogenic shock  Assessment and Plan of Treatment: During your hospitalization, you were found to be in shock. Shock is a condition that happens when blood pressure is low and not enough blood gets to the body's organs and tissues. Symptoms of shock can be different depending on the cause. Common symptoms include confusion, cool and clammy skin, urinating less than usual, fast heart rate. You were treated with oxygen, fluids, and medications that increase your blood pressure (called vasopressors). Please follow up with your provider after hospitalization. If you feel any of the above symptoms of shock, please seek immediate medical attention.   You had a form of shcok called cardiogenic shock. Cardiogenic shock occurs when your heart cannot pump enough blood and oxygen to the brain and other vital organs. Causes of cardiogenic shock include myocarditis, heart attack, valvular deficiencies, arrhythmias, or cardiomyopathy.    Diagnosis: Diabetes mellitus  Assessment and Plan of Treatment: You have a known history of diabetes mellitus prior to your admission. This condition results from blood sugar levels getting too high because your body is more resistant to insulin. Uncontrolled blood sugar levels can lead to kidney and heart damage, pain/numbness/paralysis in your hands and feet, and increased rates of infections.  To manage this you are on a medication called ____. It is important that you continue to take this medication when you are discharged so that you can continue to control your blood sugar levels. Additionally be sure to follow up with your primary care physician, podiatrist, and ophthalmologist on a regular basis.    Diagnosis: Hypertension  Assessment and Plan of Treatment: You have a known history of high blood pressure prior to your admission. To manage this you are on a medication called ____. High blood pressure can cause damage to your heart and kidneys and increases your risk of heart attack and stroke. To avoid this, It is important that you continue to take this medication when you are discharged so that you can continue to control your blood pressure. Additionally be sure to follow up with your primary care physician on a regular basis to make sure your blood pressure continues to be well controlled. If you experience symptoms such as but not limited to: sudden onset blurry vision, nausea, vomiting, chest pain, shortness of breath, or palpitations, please go to the nearest emergency room.  We scheduled a follow up with  ____ in 1-2 weeks to manage your diabetes.     PRINCIPAL DISCHARGE DIAGNOSIS  Diagnosis: Acute hypoxic respiratory failure  Assessment and Plan of Treatment: Respiratory failure is a condition where you don’t have enough oxygen in the tissues in your body (hypoxia) or when you have too much carbon dioxide in your blood (hypercapnia). You might also hear people use the term “acute hypoxemic respiratory failure (AHRF)” to describe it.  Symptoms of respiratory failure depend on the cause. Symptoms may include: Shortness of breath or feeling like you can’t get enough air (dyspnea), rapid breathing (tachypnea), extreme tiredness (fatigue), fast heart rate (feeling like your heart’s racing) or heart palpitations, and spitting or coughing blood or bloody mucus (hemoptysis).  Respiratory failure is a very serious condition. Many people survive it, depending on what’s causing it, the severity and how quickly they’re treated. If you develop new or worsening shortness of breath, rapid breathing, increased heart rate, palpitations, or hemoptysis, please go to the emergency department.  We schedule a follow up appointment with Dr. Mtz, a lung doctor, in 1-2 weeks.        SECONDARY DISCHARGE DIAGNOSES  Diagnosis: Cardiogenic shock  Assessment and Plan of Treatment: During your hospitalization, you were found to be in shock. Shock is a condition that happens when blood pressure is low and not enough blood gets to the body's organs and tissues. Symptoms of shock can be different depending on the cause. Common symptoms include confusion, cool and clammy skin, urinating less than usual, fast heart rate. You were treated with oxygen, fluids, and medications that increase your blood pressure (called vasopressors). Please follow up with your provider after hospitalization. If you feel any of the above symptoms of shock, please seek immediate medical attention.   You had a form of shcok called cardiogenic shock. Cardiogenic shock occurs when your heart cannot pump enough blood and oxygen to the brain and other vital organs. Causes of cardiogenic shock include myocarditis, heart attack, valvular deficiencies, arrhythmias, or cardiomyopathy.    Diagnosis: Diabetes mellitus  Assessment and Plan of Treatment: You have a known history of diabetes mellitus prior to your admission. This condition results from blood sugar levels getting too high because your body is more resistant to insulin. Uncontrolled blood sugar levels can lead to kidney and heart damage, pain/numbness/paralysis in your hands and feet, and increased rates of infections.  To manage this, we started you on a medication called ____. It is important that you continue to take this medication when you are discharged so that you can continue to control your blood sugar levels. Additionally be sure to follow up with your primary care physician, podiatrist, and ophthalmologist on a regular basis. We scheduled a follow up with [facility] at [address] [place] [address] in 1-2 weeks to manage your diabetes.    Diagnosis: Hypertension  Assessment and Plan of Treatment: You have a known history of high blood pressure prior to your admission. To manage this you are on a medication called ____. High blood pressure can cause damage to your heart and kidneys and increases your risk of heart attack and stroke. To avoid this, It is important that you continue to take this medication when you are discharged so that you can continue to control your blood pressure. Additionally be sure to follow up with your primary care physician on a regular basis to make sure your blood pressure continues to be well controlled. If you experience symptoms such as but not limited to: sudden onset blurry vision, nausea, vomiting, chest pain, shortness of breath, or palpitations, please go to the nearest emergency room.  We scheduled a follow up with [facility] at [address] [place] [address] in 1-2 weeks to manage your blood pressure.     PRINCIPAL DISCHARGE DIAGNOSIS  Diagnosis: Acute hypoxic respiratory failure  Assessment and Plan of Treatment: Respiratory failure is a condition where you don’t have enough oxygen in the tissues in your body (hypoxia) or when you have too much carbon dioxide in your blood (hypercapnia). You might also hear people use the term “acute hypoxemic respiratory failure (AHRF)” to describe it.  Respiratory failure is a very serious condition. Many people survive it, depending on what’s causing it, the severity and how quickly they’re treated. If you develop new or worsening shortness of breath, rapid breathing, increased heart rate, palpitations, or hemoptysis, please go to the emergency department.  We schedule a follow up appointment with Dr. Mtz, a lung doctor, in 1-2 weeks.        SECONDARY DISCHARGE DIAGNOSES  Diagnosis: Cardiogenic shock  Assessment and Plan of Treatment: During your hospitalization, you were found to be in shock. Shock is a condition that happens when blood pressure is low and not enough blood gets to the body's organs and tissues. Symptoms of shock can be different depending on the cause. Common symptoms include confusion, cool and clammy skin, urinating less than usual, fast heart rate. You were treated with oxygen, fluids, and medications that increase your blood pressure (called vasopressors).   You had a form of shock called cardiogenic shock. Cardiogenic shock occurs when your heart cannot pump enough blood and oxygen to the brain and other vital organs. Causes of cardiogenic shock include myocarditis, heart attack, valvular deficiencies, arrhythmias, or cardiomyopathy.  You underwent testing for your shock and you were found to have inflammation of the walls of your heart called myocarditis. Myocarditis can be caused by several things including viral infections and alcohol use. It is important to decrease your intake of alcohol to prevent the inflammation from worsening.  It is important to follow up with your PCP and cardiologist after your hospitalization.    Diagnosis: Diabetes mellitus  Assessment and Plan of Treatment: You have a known history of diabetes mellitus prior to your admission. This condition results from blood sugar levels getting too high because your body is more resistant to insulin. Uncontrolled blood sugar levels can lead to kidney and heart damage, pain/numbness/paralysis in your hands and feet, and increased rates of infections.  To manage this, we started you on a medication called . It is important that you continue to take this medication when you are discharged so that you can continue to control your blood sugar levels. Additionally be sure to follow up with your primary care physician, podiatrist, and ophthalmologist on a regular basis. We scheduled a follow up with [facility] at [address] [place] [address] in 1-2 weeks to manage your diabetes.    Diagnosis: Hypertension  Assessment and Plan of Treatment: You have a known history of high blood pressure prior to your admission. High blood pressure can cause damage to your heart and kidneys and increases your risk of heart attack and stroke. To avoid this, It is important that you continue to take this medication when you are discharged so that you can continue to control your blood pressure. Additionally be sure to follow up with your primary care physician on a regular basis to make sure your blood pressure continues to be well controlled. If you experience symptoms such as but not limited to: sudden onset blurry vision, nausea, vomiting, chest pain, shortness of breath, or palpitations, please go to the nearest emergency room.  We scheduled a follow up with [facility] at [address] [place] [address] in 1-2 weeks to manage your blood pressure.      Diagnosis: Diabetes mellitus  Assessment and Plan of Treatment:      PRINCIPAL DISCHARGE DIAGNOSIS  Diagnosis: Acute hypoxic respiratory failure  Assessment and Plan of Treatment: Respiratory failure is a condition where you don’t have enough oxygen in the tissues in your body (hypoxia) or when you have too much carbon dioxide in your blood (hypercapnia). You might also hear people use the term “acute hypoxemic respiratory failure (AHRF)” to describe it.  Respiratory failure is a very serious condition. Many people survive it, depending on what’s causing it, the severity and how quickly they’re treated. If you develop new or worsening shortness of breath, rapid breathing, increased heart rate, palpitations, or hemoptysis, please go to the emergency department.  We schedule a follow up appointment with Dr. Mtz, a lung doctor, in 1-2 weeks.        SECONDARY DISCHARGE DIAGNOSES  Diagnosis: Cardiogenic shock  Assessment and Plan of Treatment: During your hospitalization, you were found to be in shock. Shock is a condition that happens when blood pressure is low and not enough blood gets to the body's organs and tissues. Symptoms of shock can be different depending on the cause. Common symptoms include confusion, cool and clammy skin, urinating less than usual, fast heart rate. You were treated with oxygen, fluids, and medications that increase your blood pressure (called vasopressors).   You had a form of shock called cardiogenic shock. Cardiogenic shock occurs when your heart cannot pump enough blood and oxygen to the brain and other vital organs. Causes of cardiogenic shock include myocarditis, heart attack, valvular deficiencies, arrhythmias, or cardiomyopathy.  You underwent testing for your shock and you were found to have inflammation of the walls of your heart called myocarditis. Myocarditis can be caused by several things including viral infections and alcohol use. It is important to decrease your intake of alcohol to prevent the inflammation from worsening.  It is important to follow up with your PCP and cardiologist after your hospitalization.    Diagnosis: Hypertension  Assessment and Plan of Treatment: You have a known history of high blood pressure prior to your admission. High blood pressure can cause damage to your heart and kidneys and increases your risk of heart attack and stroke. To avoid this, It is important that you continue to take this medication when you are discharged so that you can continue to control your blood pressure. Additionally be sure to follow up with your primary care physician on a regular basis to make sure your blood pressure continues to be well controlled. If you experience symptoms such as but not limited to: sudden onset blurry vision, nausea, vomiting, chest pain, shortness of breath, or palpitations, please go to the nearest emergency room.  Please follow up with [facility] at [address] [place] [address] in 1-2 weeks to manage your blood pressure.      Diagnosis: Diabetes mellitus  Assessment and Plan of Treatment: You have a known history of diabetes mellitus prior to your admission. This condition results from blood sugar levels getting too high because your body is more resistant to insulin. Uncontrolled blood sugar levels can lead to kidney and heart damage, pain/numbness/paralysis in your hands and feet, and increased rates of infections.  To manage this, we started you on a medication called . It is important that you continue to take this medication when you are discharged so that you can continue to control your blood sugar levels. Additionally be sure to follow up with your primary care physician, podiatrist, and ophthalmologist on a regular basis. Please follow up with [facility] at [address] [place] [address] in 1-2 weeks to manage your diabetes.    Diagnosis: Diabetes mellitus  Assessment and Plan of Treatment:

## 2024-09-19 NOTE — PROGRESS NOTE ADULT - PROBLEM SELECTOR PLAN 1
Possibly 2/2 hypersensitivity pneumonitis vs eosinophilic pneumonia  Initially presented to [facility] on 9/10 for SOB, PERRY, cough x 2 weeks. Found to have increasing O2 requirements initially placed on NC > HFNC > BiPAP, eventually requiring intubation  CXR with multi-focal pneumonia.   Pt improve rapidly after treatment, which was highly concerning for hypersensitivity pneumonitis vs eosinophilic pneumonia  Patient was treated with CTX 9/10-9/14, Azithro 9/10-9/12, Bactrim 9/11-9/12, iv solumedrol 9/11-9/14 at OSH.   s/p Bronchoscopy 9/13; s/p zosyn 7d course (received half of it at [place])  Repeat CT- chest with improved bilateral infiltrates  Fungitell, autoimmune workup negative   Coxsackie A and B titers positive    Plan:  - OOB, IS  - c/w methylprednisolone 60mg qd (plan to taper to prednisone 40mg tomorrow)  - wean oxygen as tolerated  - Needs pulmonology outpatient follow up in [place] Possibly 2/2 hypersensitivity pneumonitis vs eosinophilic pneumonia -- etiology is unclear with severe PNA and ARDS. Newly reduced EF with cardiogenic shock at [place] suggesting flash pulmonary edema as possible cause   Initially presented to [place] hospital on 9/10 for SOB, PERRY, cough x 2 weeks. Found to have increasing O2 requirements initially placed on NC > HFNC > BiPAP, eventually requiring intubation  CXR with multi-focal pneumonia.   Pt improved rapidly after treatment, which was highly concerning for hypersensitivity pneumonitis vs eosinophilic pneumonia  Patient was treated with CTX 9/10-9/14, Azithro 9/10-9/12, Bactrim 9/11-9/12, iv solumedrol 9/11-9/14 at OSH.   s/p Bronchoscopy 9/13; s/p zosyn 7d course (received half of it at [place])  Repeat CT- chest with improved bilateral infiltrates  Fungitell, autoimmune workup negative   Coxsackie A and B titers positive    Plan:  - OOB, IS  - c/w methylprednisolone 60mg qd (plan to taper to prednisone 40mg tomorrow)  - wean oxygen as tolerated  - Needs pulmonology outpatient follow up in [place] Possibly 2/2 hypersensitivity pneumonitis vs eosinophilic pneumonia -- etiology is unclear with severe PNA and ARDS. Newly reduced EF with cardiogenic shock at [place] suggesting flash pulmonary edema as possible cause   Initially presented to [place] hospital on 9/10 for SOB, PERRY, cough x 2 weeks. Found to have increasing O2 requirements initially placed on NC > HFNC > BiPAP, eventually requiring intubation  CXR with multi-focal pneumonia.   Pt improved rapidly after treatment, which was highly concerning for hypersensitivity pneumonitis vs eosinophilic pneumonia  Patient was treated with CTX 9/10-9/14, Azithro 9/10-9/12, Bactrim 9/11-9/12, iv solumedrol 9/11-9/14 at OSH.   s/p Bronchoscopy 9/13; s/p zosyn 7d course (received half of it at [place])  Repeat CT- chest with improved bilateral infiltrates  Fungitell, autoimmune workup negative   Coxsackie A and B titers positive    Plan:  - OOB, IS  - tapered to prednisone 40mg today, plan to taper by half every day per pulmonology recs  - wean oxygen as tolerated  - Needs pulmonology outpatient follow up in [place]

## 2024-09-19 NOTE — PROGRESS NOTE ADULT - ASSESSMENT
38-year-old male hypertension, DM2, HLD, hepatitis admitted for treatment of acute hypoxic respiratory failure 2/2 hypersensitivity pneumonitis vs eosinophilic pneumonia, course complicated by cardiogenic shock, now of pressor support, stepped down to medicine telemetry for further management of AHRF 2/2 possible hypersensitivity pneumonitis vs. eosinophilic pneumonia, currently on 2L, saturating 94-95%, now stable for RMF

## 2024-09-19 NOTE — PROGRESS NOTE ADULT - ASSESSMENT
38M PMH HTN, DM, HLD, hepatitis initially presented to [facility] with SOB and cough transferred from [facility] for AHRF and intubated to [facility] MICU and downgraded to [facility] floor. Pulmonology consulted for AHRF 2/2 severe CAP vs flash pulmonary edema vs eosinophilic pneumonia    At [place], he was started on a course of antibiotics at [facility] with an initial ddx of severe CAP vs  vs PJP. Hospital course prior to transfer was complicated by newly reduced EF of 25% and cardiogenic shock requiring dobutamine and levophed, now weaned off. Bronchoscopy was performed on 9/13 with no BAL, cultures NGTD. HIV was negative.     9/10-9/14 treated with CTX  9/10-9/12 treated with azithromycin   9/11-9/12 treated with bactrim  9/11-9/14 treated with solumedrol  9/13 started on zosyn     Data Review:  CT chest 9/14: Extensive bilateral patchy and consolidative opacities decreased in the upper lobes and increased in the lower lobes compared to prior.   TTE:  EF 40% with mildly reduced LV systolic function, mild dilated Lv and mild symmetric LV hypertrophy  procal elevated to 4.4  Fungitell, c-ANCA, p-ANCA, Atypical ANCA, RF, CCP, DS DNA, Anti-López Ab, Anti-Ribonuclear Protein, Anti Solange-1, Scleroderma Ab, Anti SS-A Ab, Anti SS-B Ab, ANCA Reflex MPO and PROT3 negative       Upon admission to MICU, POCUS on 9/14 showed moderate to severe LV dysfunction with global hypokinesis. Repeat TTE showed EF 40% with mildly reduced LV systolic function, mild dilated Lv and mild symmetric LV hypertrophy. He was extubated on 9/15 to [facility].   With sudden decompensation, AHRF etiology is unclear with severe PNA and ARDS. Newly reduced EF with cardiogenic shock at [place] suggesting flash pulmonary edema as possible cause of his clinical picture vs rapid improvement with steroids also supports eosinophilic PNA as a differential dx, however unable to confirm with no recent BAL prior to steroids. Given no new exposures as per the patient, hypersensitivity pneumonitis. As he is clinically improving with decreasing FiO2 requirements, would hold off on bronchoscopy for now and continue current management.     Recommendations  --continue 7 day course of Zosyn  --wean Solumedrol to 60mg daily; can discontinue Bactrim for PCP PPX as no longer anticipating prolonged steroid rx at this time  --maintain euvolemia; diurese accordingly  --continue to wean O2 as tolerated  --please organize outpatient f/u with Pulmonary medicine    we will continue to follow with you  38M PMH HTN, DM, HLD, hepatitis initially presented to [facility] with SOB and cough transferred from [facility] for AHRF and intubated to [facility] MICU and downgraded to [facility] floor. Pulmonology consulted for AHRF 2/2 severe CAP vs flash pulmonary edema vs eosinophilic pneumonia    At [place], he was started on a course of antibiotics at [facility] with an initial ddx of severe CAP vs  vs PJP. Hospital course prior to transfer was complicated by newly reduced EF of 25% and cardiogenic shock requiring dobutamine and levophed, now weaned off. Bronchoscopy was performed on 9/13 with no BAL, cultures NGTD. HIV was negative.     9/10-9/14 treated with CTX  9/10-9/12 treated with azithromycin   9/11-9/12 treated with bactrim  9/11-9/14 treated with solumedrol  9/13 started on zosyn     Data Review:  CT chest 9/14: Extensive bilateral patchy and consolidative opacities decreased in the upper lobes and increased in the lower lobes compared to prior.   TTE:  EF 40% with mildly reduced LV systolic function, mild dilated Lv and mild symmetric LV hypertrophy  procal elevated to 4.4  Fungitell, c-ANCA, p-ANCA, Atypical ANCA, RF, CCP, DS DNA, Anti-López Ab, Anti-Ribonuclear Protein, Anti Solange-1, Scleroderma Ab, Anti SS-A Ab, Anti SS-B Ab, ANCA Reflex MPO and PROT3 negative       Upon admission to MICU, POCUS on 9/14 showed moderate to severe LV dysfunction with global hypokinesis. Repeat TTE showed EF 40% with mildly reduced LV systolic function, mild dilated Lv and mild symmetric LV hypertrophy. He was extubated on 9/15 to [facility].   With sudden decompensation, AHRF etiology is unclear with severe PNA and ARDS. Newly reduced EF with cardiogenic shock at [place] suggesting flash pulmonary edema as possible cause of his clinical picture vs rapid improvement with steroids also supports eosinophilic PNA as a differential dx, however unable to confirm with no recent BAL prior to steroids. Given no new exposures as per the patient, hypersensitivity pneumonitis. As he is clinically improving with decreasing FiO2 requirements, would hold off on bronchoscopy for now and continue current management.     Recommendations  --continue 7 day course of Zosyn  --currently on solumedrol 60mg IV qd, can taper down to prednisone 40 po tomorrow, reduce dosage by half each day.   --maintain euvolemia; diurese accordingly  --continue to wean O2 as tolerated  --please organize outpatient f/u with Pulmonary medicine    we will continue to follow with you

## 2024-09-19 NOTE — PROGRESS NOTE ADULT - PROBLEM SELECTOR PLAN 2
While at [place] [facility] pt was found  to have EF 25% on TTE with global left ventricular hypokinesis requiring  levophed and dobutamine  Ddx include ischemic cardiomyopathy vs eosinophilic myocardiosis (given high suspension for eosinophilic pneumonia after improvement with IV steroids) vs alcohol induced cardiomyopathy (given history of alcohol use).   POCUS on admission 9/14 showed moderate to severe LV dysfunction with global hypokinesis  Now off pressor support. A-line removed.     Plan  - Cardiology following, f/u recs  - Repeat TTE on 9/16, 40-45%, improved reported TTE at [place] 25%.   - increased valsartan to 40mg BID with hold parameters  - start lopressor 12.5mg BID  - start lasix 20mg qd  - c/w spironolactone 25mg qd  - c/w lipitor 40mg qhs  - NPO MN for possible MRI cardiac  - Goal MAP >65 While at [place] [facility] pt was found  to have EF 25% on TTE with global left ventricular hypokinesis requiring  levophed and dobutamine  Ddx include ischemic cardiomyopathy vs eosinophilic myocardiosis (given high suspension for eosinophilic pneumonia after improvement with IV steroids) vs alcohol induced cardiomyopathy (given history of alcohol use).   POCUS on admission 9/14 showed moderate to severe LV dysfunction with global hypokinesis  Now off pressor support. A-line removed  Repeat TTE on 9/16, 40-45%, improved reported TTE at [place] 25%.     Plan  - increased valsartan to 40mg BID with hold parameters  - start lopressor 12.5mg BID  - start lasix 20mg qd  - c/w spironolactone 25mg qd  - c/w lipitor 40mg qhs  - NPO MN for possible MRI cardiac tomorrow  - Goal MAP >65  - Cardiology following, recs appreciated While at [place] [facility] pt was found  to have EF 25% on TTE with global left ventricular hypokinesis requiring  levophed and dobutamine  Ddx include ischemic cardiomyopathy vs eosinophilic myocardiosis (given high suspension for eosinophilic pneumonia after improvement with IV steroids) vs alcohol induced cardiomyopathy (given history of alcohol use).   POCUS on admission 9/14 showed moderate to severe LV dysfunction with global hypokinesis  Now off pressor support. A-line removed  Repeat TTE on 9/16, 40-45%, improved reported TTE at [place] 25%.     Plan  - increased valsartan to 40mg BID with hold parameters  - start lopressor 12.5mg BID  - start lasix 20mg qd  - c/w spironolactone 25mg qd  - c/w lipitor 40mg qhs  - NPO MN for MR cardiac tomorrow to assess for myocarditis and scar tissue  - Can consider CCTA in future for ischemic evaluation  - Goal MAP >65  - Cardiology following, recs appreciated

## 2024-09-19 NOTE — PROGRESS NOTE ADULT - SUBJECTIVE AND OBJECTIVE BOX
Cardiology  Aniya Holly | PGY-4    OVERNIGHT EVENTS: No overnight events    Tele: sinus 70-85  SUBJECTIVE: Denies chest pain, palpitations, worsening dyspnea, worsening b/l LE edema, dizziness/syncope.      MEDICATIONS  (STANDING):  artificial  tears Solution 1 Drop(s) Both EYES four times a day  atorvastatin 40 milliGRAM(s) Oral at bedtime  chlorhexidine 2% Cloths 1 Application(s) Topical <User Schedule>  dextrose 5%. 1000 milliLiter(s) (100 mL/Hr) IV Continuous <Continuous>  dextrose 5%. 1000 milliLiter(s) (50 mL/Hr) IV Continuous <Continuous>  dextrose 50% Injectable 12.5 Gram(s) IV Push once  dextrose 50% Injectable 25 Gram(s) IV Push once  dextrose 50% Injectable 25 Gram(s) IV Push once  furosemide    Tablet 20 milliGRAM(s) Oral daily  glucagon  Injectable 1 milliGRAM(s) IntraMuscular once  heparin   Injectable 7500 Unit(s) SubCutaneous every 8 hours  insulin glargine Injectable (LANTUS) 26 Unit(s) SubCutaneous at bedtime  insulin lispro (ADMELOG) corrective regimen sliding scale   SubCutaneous at bedtime  insulin lispro (ADMELOG) corrective regimen sliding scale   SubCutaneous three times a day before meals  insulin lispro Injectable (ADMELOG) 11 Unit(s) SubCutaneous three times a day before meals  metoprolol tartrate 12.5 milliGRAM(s) Oral every 12 hours  nystatin    Suspension 781997 Unit(s) Swish and Swallow every 6 hours  pantoprazole    Tablet 40 milliGRAM(s) Oral before breakfast  potassium chloride   Powder 40 milliEquivalent(s) Oral once  predniSONE   Tablet 40 milliGRAM(s) Oral daily  sodium phosphate 15 milliMole(s)/250 mL IVPB 15 milliMole(s) IV Intermittent once  spironolactone 25 milliGRAM(s) Oral daily  valsartan 40 milliGRAM(s) Oral every 12 hours    MEDICATIONS  (PRN):  dextrose Oral Gel 15 Gram(s) Oral once PRN Blood Glucose LESS THAN 70 milliGRAM(s)/deciliter        T(F): 98.2 (09-19-24 @ 13:50), Max: 99.5 (09-19-24 @ 01:00)  HR: 82 (09-19-24 @ 11:51) (70 - 90)  BP: 129/75 (09-19-24 @ 11:51) (115/67 - 140/93)  BP(mean): 93 (09-19-24 @ 11:51) (86 - 112)  RR: 16 (09-19-24 @ 11:51) (16 - 20)  SpO2: 92% (09-19-24 @ 11:51) (91% - 96%)    PHYSICAL EXAM:     GENERAL: NAD, lying in bed comfortably  HEAD:  Atraumatic, Normocephalic  EYES: EOMI, PERRLA, conjunctiva and sclera clear, no nystagmus noted  ENT: Moist mucous membranes,   NECK: Supple, No JVD, trachea midline  CHEST/LUNG: Clear to auscultation bilaterally; No rales, rhonchi, wheezing, or rubs. Unlabored respirations  HEART: Regular rate and rhythm; No murmurs, rubs, or gallops, normal S1/S2  ABDOMEN: normal bowel sounds; Soft, nontender, nondistended, no organomegaly   EXTREMITIES:  2+ Peripheral Pulses, brisk capillary refill. No clubbing, cyanosis, or edema  MSK: No gross deformities noted   Neurological:  A&Ox3, no focal deficits   SKIN: No rashes or lesions  PSYCH: Normal mood, affect     TELEMETRY:    LABS:                        13.8   11.50 )-----------( 273      ( 19 Sep 2024 05:50 )             40.2     09-19    138  |  104  |  28[H]  ----------------------------<  116[H]  3.3[L]   |  25  |  1.14    Ca    8.1[L]      19 Sep 2024 05:50  Phos  3.1     09-19  Mg     1.7     09-19    TPro  5.8[L]  /  Alb  2.7[L]  /  TBili  0.6  /  DBili  x   /  AST  12  /  ALT  18  /  AlkPhos  70  09-18            Creatinine Trend: 1.14<--, 1.17<--, 1.36<--, 1.41<--, 2.70<--, 2.63<--  I&O's Summary    18 Sep 2024 07:01  -  19 Sep 2024 07:00  --------------------------------------------------------  IN: 0 mL / OUT: 1400 mL / NET: -1400 mL      BNP    RADIOLOGY & ADDITIONAL STUDIES:

## 2024-09-19 NOTE — PROGRESS NOTE ADULT - ASSESSMENT
38-year-old male hypertension, DM2, HLD, hepatitis presents with acute hypoxic respiratory failure c/b undifferentiated shock w/ new HFrEF    Review of Studies:  TTE 9/16/24: LVIDd 6, LV mildly dilated and mild concentric LVH w/ LVEF 40-45% w/ RWMA, RV normal size and function, LA mildly dilated, RA borderline dilated, trace AR, insufficient TR for PASP, IVC estimated RAP 8   TTE 9/12/24: Left ventricular systolic function is severely decreased with an ejection fraction of 25 % by Valle's method of disks. Global left ventricular hypokinesis. The right ventricle is not well visualized, probably normal right ventricular systolic function. The cardiac valves are not well seen except for the aortic valve which appears normal. No pericardial effusion seen.  ECG: Sinus tachycardia with left axis deviation, NSST changes     #HFrEF  Etiology: Unclear etiology at this time. Stress CM vs. myocarditis vs. ischemic vs. etoh given hx. Would defer CCTA for ischemic eval until DIANDRA improves more   GDMT: Increase valsartan 20 mg q12 to valsartan 40 mg q12 and start metoprolol tartrate 12.5 mg q12 with strict holding parameters 100/60 BP. If patient tolerates these changes, can uptitrate metoprolol tartrate 12.5 mg q12 to metoprolol tartrate 25 mg q12 starting 9/20 am. Per primary team, there will be financial barriers to entresto for this patient unfortunately due to lack of insurance.  Diuretics: Start furosemide 20 mg po q24  - Please obtain urine protein/creatinine. HIV negative, TSH (1.1), iron studies (no iron deficiency noted), lipid panel (), pro-bnp (1806 9/17). A1c noted of 11.6, positive coxsackie A + B titers noted  - Will hold off on CCTA for ischemic eval. Will recommend pursuing cardiac MRI. Please ensure patient receives second generation gadolinium that is renal protective for the cMRI. For cMRI, only needs to be NPO for 1 hr prior    #DM  - Consider type 1 DM work up as well as endocrine consult to help establish care as well as discharge recs for uncontrolled DM  - Continue atorvastatin 40 mg q24    #HTN  Denies taking any home meds  - See GDMT above

## 2024-09-19 NOTE — DISCHARGE NOTE PROVIDER - CARE PROVIDER_API CALL
Steven Mtz  Internal Medicine  [address] [place], [place] [address]  Phone: [phone]  Fax: [phone]  Follow Up Time: 1 week

## 2024-09-19 NOTE — DISCHARGE NOTE PROVIDER - NSDCMRMEDTOKEN_GEN_ALL_CORE_FT
hydroCHLOROthiazide 25 mg oral tablet: 1 tab(s) orally once a day  lisinopril 40 mg oral tablet: 1 tab(s) orally once a day  MetFORMIN (Eqv-Glumetza) 1000 mg oral tablet, extended release: 1 tab(s) orally 2 times a day  predniSONE 10 mg oral tablet: 6 tab(s) orally once a day   atorvastatin 40 mg oral tablet: 1 tab(s) orally once a day (at bedtime)  carvedilol 6.25 mg oral tablet: 1 tab(s) orally every 12 hours  Diovan 160 mg oral tablet: 1 tab(s) orally once a day  furosemide 20 mg oral tablet: 1 tab(s) orally once a day for 2 weeks, then follow up with your cardiologist for further recommendations.  NovoLOG 100 units/mL injectable solution: 11 unit(s) injectable 3 times a day (before meals)  nystatin 100,000 units/mL oral suspension: 5 milliliter(s) orally every 6 hours  spironolactone 25 mg oral tablet: 1 tab(s) orally once a day  Tresiba 100 units/mL subcutaneous solution: 22 unit(s) subcutaneous once a day (at bedtime)

## 2024-09-19 NOTE — PROGRESS NOTE ADULT - ATTENDING COMMENTS
Patient is a 38-yo old male with Pmhx of Hypertension, DM2, HLD, Etoh use disorder, non compliant with Medical therapy, transferred from [facility], for management of acute hypoxic respiratory failure with concern for Eosinophilic pneumonitis, found to be in Shock, with ATN and severe LV dysfunction for which cardiology was consulted     Review of Studies:  - TTE 09/16/2024: Dilated LV, EF 40% Global Hypokinesis; Mild inferior wal hypokinesis; LA borderline dilated; Mildly dilated RA  - TTE 9/12/24: Left ventricular systolic function is severely decreased with an ejection fraction of 25 % by Valle's method of disks. Global left ventricular hypokinesis. The right ventricle is not well visualized, probably normal right ventricular systolic function. The cardiac valves are not well seen except for the aortic valve which appears normal. No pericardial effusion seen.    # Systolic Heart Failure, likely Acute on Chronic  - Patient has known prior Hx of HTN and DM, previously on Lisinopril. It appears he has not taken his medications '' for a while'' after his Rx ran out  - Patient works in the [employer] and as such reports consuming Alchohol on the daily, at times tasting over a dozen drinks a day. He denies history of DTs or ETOH intoxication requiring hospitalization. He denies hx of Drug use or family Hx of Heart Failure  - In the past year he has noticed progressive worsening shortness of breath, and weight gain. When prompted further about symptoms and history patient is not entirely forthcoming or direct about his symptoms, time of onset and progression etc  - Echo reviewed. EF better visualized on Definity pictures. LV function is mildly reduced around 40%. The LV is dilated. There is borderline biatrial enlargement and the IVC is dilated consistent with elevated RAP.   - Clinically patient is warm, well perfused and compensated. He continues to require oxygen  - ATN is resolving with continued improvement in UO and renal function  - At this time Etiology of Cardiomyopathy likely Non ischemic in nature.   - As patient not insured with High [employer] pay for Entresto will maintain on Valsartan  - Please increase Valsartan to 40 mg po BID with strict holding parameters with goal to uptitrate as tolerated to 80 mg po BID  - Initiate BB therapy with Lopressor 12.5 mg po BID starting 9/19 am. Ultimately patient will be discharged on Toprol   - Would maintain on  Lasix 20 mg po daily and spironolactone 25 mg po daily   - [place] Antibody Markers are all elevated. BNP on admission wal elvated to 1800 with Negative HsTrop T. Patient has not complained of pleuritic chest pain. There is overall low suspicion of Myopericarditis however given overall clinical pictures, LV dysfunction, dilated LV, would recommend Cardiac MRI with Trey (renal protocol) for further assessment  - Will defer CCTA for when patient is able to tolerate optimal BB therapy dose  - Endocrine following for A1c of 12.   - TSH WNL. Patient with Marked Hypertriglyceridemia and HLD with LDL of 120. Given concurrent DM, cont Lipitor 40 mg po Qd  - Pending MRI and results, will discuss role of genetic screening more at length  - Cardiology will cont to follow with you, please call with any questions .

## 2024-09-19 NOTE — PROGRESS NOTE ADULT - SUBJECTIVE AND OBJECTIVE BOX
--------------------TRANSFER FROM [facility] TO [facility]--------------------------------  Hospital course:  38yoM with a PMHx of HTN, DM2 (uncontrolled, A1c 12), HLD, hepatitis initially presented to [facility] 9/10 ER for cough for 2 weeks. Reported cough productive of yellow sputum, runny nose, subjective fevers as well and PERRY. [facility], tested him for COVID (negative), and sent him to the ER. In the ED, the patient was found to have a multifocal pneumonia and started on treatment for CAP. He was initially on 3LNC on 9/10 but on 9/11 O2 req increased > HFNC. CT Chest 9/11 extensive multifocal consolidations > transferred to ICU > BiPAP > intubated 9/12. Treated with ARDS protocol, paralyzed, proned 9/12-9/13. ddx severe CAP vs  vs PJP. Treated with CTX 9/10-9/14, azithro 9/10-9/12, bactrim 9/11-9/12, iv solumedrol 9/11-9/14. Zosyn 4.5g for 7d completed. Bronch 9/13, pending results. HIV neg. Reintubated 9/14.  decreased UOP. Course further c/b newly reduced EF 25% and cardiogenic shock req dobutamine. Admitted to MICU for further management. POCUS on admission 9/14: moderate to severe LV dysfunction with global hypokinesis. dobutamine discontinued 9/15. Stepped down to telemetry for management of acute hypoxic respiratory failure. 9/19, tapered methylprednisolone 50BID to 60mg qd, also weaned from HFNC to 4L NC, saturating 94%. On valsartan 40mg BID, spironolactone 25mg qd, lopressor 12.5mg BID, lasix 20mg qd for GDMT optimization. 9/19, weaned to 2L O2, saturating 92-95% and comfortable. Patient is stable to be transferred to [facility] for management of AHRF 2/2 to possible hypersensitivity pneumonitis vs eosinophilic pneumonia.      SUBJECTIVE / INTERVAL HPI: Patient seen and examined at bedside.       PHYSICAL EXAM:    General: Lying comfortably and in no acute distress  HEENT: NC/AT; EOMI, anicteric sclera; MMM  Neck: supple  Cardiovascular: +S1/S2, RRR  Respiratory: CTA B/L; no W/R/R  Gastrointestinal: soft, NT/ND; +BSx4  Extremities: WWP; no edema, clubbing or cyanosis  Vascular: 2+ radial pulses B/L  Neurological: AAOx3; no focal deficits  Psychiatric: pleasant mood and affect  Dermatologic: no appreciable wounds or damage to the skin    VITAL SIGNS:  Vital Signs Last 24 Hrs  T(C): 36.8 (19 Sep 2024 13:50), Max: 37.5 (19 Sep 2024 01:00)  T(F): 98.2 (19 Sep 2024 13:50), Max: 99.5 (19 Sep 2024 01:00)  HR: 82 (19 Sep 2024 11:51) (70 - 90)  BP: 129/75 (19 Sep 2024 11:51) (115/67 - 140/93)  BP(mean): 93 (19 Sep 2024 11:51) (86 - 112)  RR: 16 (19 Sep 2024 11:51) (16 - 20)  SpO2: 92% (19 Sep 2024 11:51) (91% - 96%)    Parameters below as of 19 Sep 2024 11:51  Patient On (Oxygen Delivery Method): nasal cannula w/ humidification  O2 Flow (L/min): 6        MEDICATIONS:  MEDICATIONS  (STANDING):  artificial  tears Solution 1 Drop(s) Both EYES four times a day  atorvastatin 40 milliGRAM(s) Oral at bedtime  chlorhexidine 2% Cloths 1 Application(s) Topical <User Schedule>  dextrose 5%. 1000 milliLiter(s) (100 mL/Hr) IV Continuous <Continuous>  dextrose 5%. 1000 milliLiter(s) (50 mL/Hr) IV Continuous <Continuous>  dextrose 50% Injectable 12.5 Gram(s) IV Push once  dextrose 50% Injectable 25 Gram(s) IV Push once  dextrose 50% Injectable 25 Gram(s) IV Push once  furosemide    Tablet 20 milliGRAM(s) Oral daily  glucagon  Injectable 1 milliGRAM(s) IntraMuscular once  heparin   Injectable 7500 Unit(s) SubCutaneous every 8 hours  insulin glargine Injectable (LANTUS) 26 Unit(s) SubCutaneous at bedtime  insulin lispro (ADMELOG) corrective regimen sliding scale   SubCutaneous three times a day before meals  insulin lispro (ADMELOG) corrective regimen sliding scale   SubCutaneous at bedtime  insulin lispro Injectable (ADMELOG) 11 Unit(s) SubCutaneous three times a day before meals  metoprolol tartrate 12.5 milliGRAM(s) Oral every 12 hours  nystatin    Suspension 413256 Unit(s) Swish and Swallow every 6 hours  pantoprazole    Tablet 40 milliGRAM(s) Oral before breakfast  potassium chloride   Powder 40 milliEquivalent(s) Oral once  predniSONE   Tablet 40 milliGRAM(s) Oral daily  sodium phosphate 15 milliMole(s)/250 mL IVPB 15 milliMole(s) IV Intermittent once  spironolactone 25 milliGRAM(s) Oral daily  valsartan 40 milliGRAM(s) Oral every 12 hours    MEDICATIONS  (PRN):  dextrose Oral Gel 15 Gram(s) Oral once PRN Blood Glucose LESS THAN 70 milliGRAM(s)/deciliter      ALLERGIES:  Allergies    [place] (Stomach Upset)  [place] (Stomach Upset)  No Known Drug Allergies    Intolerances        LABS:                        13.8   11.50 )-----------( 273      ( 19 Sep 2024 05:50 )             40.2     09-19    138  |  104  |  28[H]  ----------------------------<  116[H]  3.3[L]   |  25  |  1.14    Ca    8.1[L]      19 Sep 2024 05:50  Phos  3.1     09-19  Mg     1.7     09-19    TPro  5.8[L]  /  Alb  2.7[L]  /  TBili  0.6  /  DBili  x   /  AST  12  /  ALT  18  /  AlkPhos  70  09-18      Urinalysis Basic - ( 19 Sep 2024 05:50 )    Color: x / Appearance: x / SG: x / pH: x  Gluc: 116 mg/dL / Ketone: x  / Bili: x / Urobili: x   Blood: x / Protein: x / Nitrite: x   Leuk Esterase: x / RBC: x / WBC x   Sq Epi: x / Non Sq Epi: x / Bacteria: x      CAPILLARY BLOOD GLUCOSE      POCT Blood Glucose.: 142 mg/dL (19 Sep 2024 11:16)      RADIOLOGY & ADDITIONAL TESTS: Reviewed. --------------------TRANSFER FROM [facility] TO [facility]--------------------------------  Hospital course:  38yoM with a PMHx of HTN, DM2 (uncontrolled, A1c 12), HLD, hepatitis initially presented to [facility] 9/10 ER for cough for 2 weeks. Reported cough productive of yellow sputum, runny nose, subjective fevers, and PERRY. Pt was COVID negative, and sent to the ER and was found to have a multifocal pneumonia. Was initially on 3LNC but O2 req increased > HFNC. CT Chest showing extensive multifocal consolidations > transferred to ICU > BiPAP > intubated 9/12. Ddx severe CAP vs  vs PJP and treated with CTX, Azithro, bactrim, Zosyn 4.5g for 7d, IV solumedrol 9/11-9/14. Bronch done on 9/13, pending results. Reintubated 9/14 with decreased UOP.     Course further c/b newly reduced EF 25% and cardiogenic shock req dobutamine. POCUS on admission 9/14: moderate to severe LV dysfunction with global hypokinesis, dobutamine discontinued 9/15. Stepped down to telemetry for management of acute hypoxic respiratory failure. Methylprednisolone tapered to 60mg qd, also weaned from HFNC to 4L NC, saturating 94%. On optimized GDMT . On 9/19, weaned to 2L O2, saturating 92-95% and comfortable. Patient is stable to be transferred to [facility] for management of AHRF 2/2 to possible hypersensitivity pneumonitis vs eosinophilic pneumonia.    SUBJECTIVE / INTERVAL HPI: Patient seen and examined at bedside.     PHYSICAL EXAM:    General: Lying comfortably and in no acute distress  HEENT: NC/AT; EOMI, anicteric sclera; MMM  Neck: supple  Cardiovascular: +S1/S2, RRR  Respiratory: CTA B/L; no W/R/R  Gastrointestinal: soft, NT/ND; +BSx4  Extremities: WWP; no edema, clubbing or cyanosis  Vascular: 2+ radial pulses B/L  Neurological: AAOx3; no focal deficits  Psychiatric: pleasant mood and affect  Dermatologic: no appreciable wounds or damage to the skin    VITAL SIGNS:  Vital Signs Last 24 Hrs  T(C): 36.8 (19 Sep 2024 13:50), Max: 37.5 (19 Sep 2024 01:00)  T(F): 98.2 (19 Sep 2024 13:50), Max: 99.5 (19 Sep 2024 01:00)  HR: 82 (19 Sep 2024 11:51) (70 - 90)  BP: 129/75 (19 Sep 2024 11:51) (115/67 - 140/93)  BP(mean): 93 (19 Sep 2024 11:51) (86 - 112)  RR: 16 (19 Sep 2024 11:51) (16 - 20)  SpO2: 92% (19 Sep 2024 11:51) (91% - 96%)    Parameters below as of 19 Sep 2024 11:51  Patient On (Oxygen Delivery Method): nasal cannula w/ humidification  O2 Flow (L/min): 6        MEDICATIONS:  MEDICATIONS  (STANDING):  artificial  tears Solution 1 Drop(s) Both EYES four times a day  atorvastatin 40 milliGRAM(s) Oral at bedtime  chlorhexidine 2% Cloths 1 Application(s) Topical <User Schedule>  dextrose 5%. 1000 milliLiter(s) (100 mL/Hr) IV Continuous <Continuous>  dextrose 5%. 1000 milliLiter(s) (50 mL/Hr) IV Continuous <Continuous>  dextrose 50% Injectable 12.5 Gram(s) IV Push once  dextrose 50% Injectable 25 Gram(s) IV Push once  dextrose 50% Injectable 25 Gram(s) IV Push once  furosemide    Tablet 20 milliGRAM(s) Oral daily  glucagon  Injectable 1 milliGRAM(s) IntraMuscular once  heparin   Injectable 7500 Unit(s) SubCutaneous every 8 hours  insulin glargine Injectable (LANTUS) 26 Unit(s) SubCutaneous at bedtime  insulin lispro (ADMELOG) corrective regimen sliding scale   SubCutaneous three times a day before meals  insulin lispro (ADMELOG) corrective regimen sliding scale   SubCutaneous at bedtime  insulin lispro Injectable (ADMELOG) 11 Unit(s) SubCutaneous three times a day before meals  metoprolol tartrate 12.5 milliGRAM(s) Oral every 12 hours  nystatin    Suspension 231552 Unit(s) Swish and Swallow every 6 hours  pantoprazole    Tablet 40 milliGRAM(s) Oral before breakfast  potassium chloride   Powder 40 milliEquivalent(s) Oral once  predniSONE   Tablet 40 milliGRAM(s) Oral daily  sodium phosphate 15 milliMole(s)/250 mL IVPB 15 milliMole(s) IV Intermittent once  spironolactone 25 milliGRAM(s) Oral daily  valsartan 40 milliGRAM(s) Oral every 12 hours    MEDICATIONS  (PRN):  dextrose Oral Gel 15 Gram(s) Oral once PRN Blood Glucose LESS THAN 70 milliGRAM(s)/deciliter      ALLERGIES:  Allergies    [place] (Stomach Upset)  [place] (Stomach Upset)  No Known Drug Allergies    Intolerances        LABS:                        13.8   11.50 )-----------( 273      ( 19 Sep 2024 05:50 )             40.2     09-19    138  |  104  |  28[H]  ----------------------------<  116[H]  3.3[L]   |  25  |  1.14    Ca    8.1[L]      19 Sep 2024 05:50  Phos  3.1     09-19  Mg     1.7     09-19    TPro  5.8[L]  /  Alb  2.7[L]  /  TBili  0.6  /  DBili  x   /  AST  12  /  ALT  18  /  AlkPhos  70  09-18      Urinalysis Basic - ( 19 Sep 2024 05:50 )    Color: x / Appearance: x / SG: x / pH: x  Gluc: 116 mg/dL / Ketone: x  / Bili: x / Urobili: x   Blood: x / Protein: x / Nitrite: x   Leuk Esterase: x / RBC: x / WBC x   Sq Epi: x / Non Sq Epi: x / Bacteria: x      CAPILLARY BLOOD GLUCOSE      POCT Blood Glucose.: 142 mg/dL (19 Sep 2024 11:16)      RADIOLOGY & ADDITIONAL TESTS: Reviewed. --------------------TRANSFER FROM [facility] TO [facility]--------------------------------  Hospital course:  38yoM with a PMHx of HTN, DM2 (uncontrolled, A1c 12), HLD, hepatitis initially presented to [facility] 9/10 ER for cough for 2 weeks. Reported cough productive of yellow sputum, runny nose, subjective fevers, and PERRY. Pt was COVID negative, and sent to the ER and was found to have a multifocal pneumonia. Was initially on 3LNC but O2 req increased > HFNC. CT Chest showing extensive multifocal consolidations > transferred to ICU > BiPAP > intubated 9/12. Ddx severe CAP vs  vs PJP and treated with CTX, Azithro, bactrim, Zosyn 4.5g for 7d, IV solumedrol 9/11-9/14. Bronch done on 9/13, no significant findings. Pt was reintubated 9/14 and admitted to MICU at [facility].    Course further c/b newly reduced EF 25% and cardiogenic shock req dobutamine. POCUS on admission 9/14: moderate to severe LV dysfunction with global hypokinesis, dobutamine discontinued 9/15. Stepped down to telemetry for management of acute hypoxic respiratory failure. Methylprednisolone tapered to 60mg qd, also weaned from HFNC to 4L NC, saturating 94%. On optimized GDMT . On 9/19, weaned to 2L O2, saturating 92-95% and comfortable. Patient is stable to be transferred to [facility] for management of AHRF 2/2 to possible hypersensitivity pneumonitis vs eosinophilic pneumonia.    SUBJECTIVE / INTERVAL HPI: Patient seen and examined at bedside. Pt denies any new complaints at this time. Reports he was ambulating with PT on 2L/NC and did not feel SOB. He currently denies any cough, fever, chills, CP, abdominal pain, urinary symptoms, diarrhea, or constipation. Pt admits to mild LE edema. Pt denies any prior pulmonary hx and does not follow with a pulmonologist.     PHYSICAL EXAM:    General: Lying comfortably and in no acute distress  HEENT: NC/AT; EOMI, anicteric sclera; MMM  Neck: supple  Cardiovascular: +S1/S2, RRR  Respiratory: CTA B/L; no W/R/R  Gastrointestinal: soft, NT/ND; +BSx4  Extremities: WWP; no clubbing or cyanosis. 1+ bilateral pitting edema  Vascular: 2+ radial pulses B/L  Neurological: AAOx3; no focal deficits  Psychiatric: pleasant mood and affect  Dermatologic: no appreciable wounds or damage to the skin    VITAL SIGNS:  Vital Signs Last 24 Hrs  T(C): 36.8 (19 Sep 2024 13:50), Max: 37.5 (19 Sep 2024 01:00)  T(F): 98.2 (19 Sep 2024 13:50), Max: 99.5 (19 Sep 2024 01:00)  HR: 82 (19 Sep 2024 11:51) (70 - 90)  BP: 129/75 (19 Sep 2024 11:51) (115/67 - 140/93)  BP(mean): 93 (19 Sep 2024 11:51) (86 - 112)  RR: 16 (19 Sep 2024 11:51) (16 - 20)  SpO2: 92% (19 Sep 2024 11:51) (91% - 96%)    Parameters below as of 19 Sep 2024 11:51  Patient On (Oxygen Delivery Method): nasal cannula w/ humidification  O2 Flow (L/min): 6        MEDICATIONS:  MEDICATIONS  (STANDING):  artificial  tears Solution 1 Drop(s) Both EYES four times a day  atorvastatin 40 milliGRAM(s) Oral at bedtime  chlorhexidine 2% Cloths 1 Application(s) Topical <User Schedule>  dextrose 5%. 1000 milliLiter(s) (100 mL/Hr) IV Continuous <Continuous>  dextrose 5%. 1000 milliLiter(s) (50 mL/Hr) IV Continuous <Continuous>  dextrose 50% Injectable 12.5 Gram(s) IV Push once  dextrose 50% Injectable 25 Gram(s) IV Push once  dextrose 50% Injectable 25 Gram(s) IV Push once  furosemide    Tablet 20 milliGRAM(s) Oral daily  glucagon  Injectable 1 milliGRAM(s) IntraMuscular once  heparin   Injectable 7500 Unit(s) SubCutaneous every 8 hours  insulin glargine Injectable (LANTUS) 26 Unit(s) SubCutaneous at bedtime  insulin lispro (ADMELOG) corrective regimen sliding scale   SubCutaneous three times a day before meals  insulin lispro (ADMELOG) corrective regimen sliding scale   SubCutaneous at bedtime  insulin lispro Injectable (ADMELOG) 11 Unit(s) SubCutaneous three times a day before meals  metoprolol tartrate 12.5 milliGRAM(s) Oral every 12 hours  nystatin    Suspension 305449 Unit(s) Swish and Swallow every 6 hours  pantoprazole    Tablet 40 milliGRAM(s) Oral before breakfast  potassium chloride   Powder 40 milliEquivalent(s) Oral once  predniSONE   Tablet 40 milliGRAM(s) Oral daily  sodium phosphate 15 milliMole(s)/250 mL IVPB 15 milliMole(s) IV Intermittent once  spironolactone 25 milliGRAM(s) Oral daily  valsartan 40 milliGRAM(s) Oral every 12 hours    MEDICATIONS  (PRN):  dextrose Oral Gel 15 Gram(s) Oral once PRN Blood Glucose LESS THAN 70 milliGRAM(s)/deciliter      ALLERGIES:  Allergies    [place] (Stomach Upset)  [place] (Stomach Upset)  No Known Drug Allergies    Intolerances        LABS:                        13.8   11.50 )-----------( 273      ( 19 Sep 2024 05:50 )             40.2     09-19    138  |  104  |  28[H]  ----------------------------<  116[H]  3.3[L]   |  25  |  1.14    Ca    8.1[L]      19 Sep 2024 05:50  Phos  3.1     09-19  Mg     1.7     09-19    TPro  5.8[L]  /  Alb  2.7[L]  /  TBili  0.6  /  DBili  x   /  AST  12  /  ALT  18  /  AlkPhos  70  09-18      Urinalysis Basic - ( 19 Sep 2024 05:50 )    Color: x / Appearance: x / SG: x / pH: x  Gluc: 116 mg/dL / Ketone: x  / Bili: x / Urobili: x   Blood: x / Protein: x / Nitrite: x   Leuk Esterase: x / RBC: x / WBC x   Sq Epi: x / Non Sq Epi: x / Bacteria: x      CAPILLARY BLOOD GLUCOSE      POCT Blood Glucose.: 142 mg/dL (19 Sep 2024 11:16)      RADIOLOGY & ADDITIONAL TESTS: Reviewed.

## 2024-09-19 NOTE — DISCHARGE NOTE PROVIDER - [id number]_GEN_ALL_CORE_FT
We scheduled a follow up with [facility] at [address], [place], [place] [address] in 1-2 weeks.  Please call [facility] located at [address] [place] [address], phone: [phone] to schedule an appointment with your primary care provider.     Please call the office of Dr. Steven Mtz to schedule an appointment within 1-2 weeks of discharge.

## 2024-09-19 NOTE — DISCHARGE NOTE PROVIDER - HOSPITAL COURSE
#Discharge: do not delete    38-year-old male hypertension, DM2, HLD, hepatitis admitted for treatment of acute hypoxic respiratory failure 2/2 hypersensitivity pneumonitis vs eosinophilic pneumonia, course complicated by cardiogenic shock, now of pressor support and improved oxygenation, admitted to floors for further management of AHRF 2/2 possible hypersensitivity pneumonitis vs. eosinophilic pneumonia.    Problem List/Main Diagnoses (system-based):   #Acute hypoxic respiratory failure.   Possibly 2/2 hypersensitivity pneumonitis vs eosinophilic pneumonia -- etiology is unclear with severe PNA and ARDS. Newly reduced EF with cardiogenic shock at [place] suggesting flash pulmonary edema as possible cause   Initially presented to [facility] on 9/10 for SOB, PERRY, cough x 2 weeks. Found to have increasing O2 requirements initially placed on NC > HFNC > BiPAP, eventually requiring intubation  CXR with multi-focal pneumonia.   Pt improved rapidly after treatment, which was highly concerning for hypersensitivity pneumonitis vs eosinophilic pneumonia  Patient was treated with CTX 9/10-9/14, Azithro 9/10-9/12, Bactrim 9/11-9/12, iv solumedrol 9/11-9/14 at OSH.   s/p Bronchoscopy 9/13; s/p zosyn 7d course (received half of it at [place])  Repeat CT- chest with improved bilateral infiltrates  Fungitell, autoimmune workup negative   Coxsackie A and B titers positive    Plan:  - OP pulmonology follow up in [place].    #Cardiogenic shock  Resolved  While at [facility] pt was found  to have EF 25% on TTE with global left ventricular hypokinesis requiring  levophed and dobutamine  Ddx include ischemic cardiomyopathy vs eosinophilic myocardiosis (given high suspension for eosinophilic pneumonia after improvement with IV steroids) vs alcohol induced cardiomyopathy (given history of alcohol use).   POCUS on admission 9/14 showed moderate to severe LV dysfunction with global hypokinesis  Now off pressor support. A-line removed  Repeat TTE on 9/16, 40-45%, improved reported TTE at [place] 25%.     Plan:  -     #Hypertension.   Per chart review, home lisinopril 40mg and HCTZ 25mg but has not taken for past 2 months, however unsure if patient has been taking medications at home   Now Off pressor support     Plan:   - c/w valsartan 20mg BID with hold parameters  - lopressor 12.5mg BID  - Goal MAP > 65.    #DIANDRA (acute kidney injury).   Resolved  Hess exchanged 9/14  At OSH noted to have increase in BUN and Cr with concern for anuria, making urine at H   Suspect prerenal in setting of cardiogenic shock with possibly septic shock   Cr. 3.49 ----> 2.70 ---> 1.17 -> 1.14  Urine Lytes 9/15: Na<20, Cr:120, Urea: 647, Osm: 396      Problem/Plan - 5:  #Diabetes mellitus.   Poorly controlled; A1c 12% during [place] Admission   Per chart review, Home metformin 1000mg BID but has not taken for past 2 months    Plan:        Patient was discharged to: (home/[facility]/acute rehab/hospice, etc. and w/ home health/home PT/RN/home O2)    New medications:   Changes to old medications:  Medications that were stopped:    Items to follow up as outpatient: PCP, Pulmonology    Physical exam at the time of discharge:       LABS & STUDIES:  SARS-CoV-2: Adrián (14 Sep 2024 21:35)   #Discharge: do not delete    38-year-old male hypertension, DM2, HLD, hepatitis admitted for treatment of acute hypoxic respiratory failure 2/2 hypersensitivity pneumonitis vs eosinophilic pneumonia, course complicated by cardiogenic shock, now of pressor support and improved oxygenation, admitted to floors for further management of AHRF 2/2 possible hypersensitivity pneumonitis vs. eosinophilic pneumonia.    Problem List/Main Diagnoses (system-based):   #Acute hypoxic respiratory failure.   Possibly 2/2 hypersensitivity pneumonitis vs eosinophilic pneumonia -- etiology is unclear with severe PNA and ARDS. Newly reduced EF with cardiogenic shock at [place] suggesting flash pulmonary edema as possible cause   Initially presented to [facility] on 9/10 for SOB, PERRY, cough x 2 weeks. Found to have increasing O2 requirements initially placed on NC > HFNC > BiPAP, eventually requiring intubation  CXR with multi-focal pneumonia.   Pt improved rapidly after treatment, which was highly concerning for hypersensitivity pneumonitis vs eosinophilic pneumonia  Patient was treated with CTX 9/10-9/14, Azithro 9/10-9/12, Bactrim 9/11-9/12, iv solumedrol 9/11-9/14 at OSH.   s/p Bronchoscopy 9/13; s/p zosyn 7d course (received half of it at [place])  Repeat CT- chest with improved bilateral infiltrates  Fungitell, autoimmune workup negative   Coxsackie A and B titers positive    Plan:  - OP pulmonology follow up in [place]    #Cardiogenic shock  Resolved  While at [facility] pt was found  to have EF 25% on TTE with global left ventricular hypokinesis requiring  levophed and dobutamine  Ddx include ischemic cardiomyopathy vs eosinophilic myocardiosis (given high suspension for eosinophilic pneumonia after improvement with IV steroids) vs alcohol induced cardiomyopathy (given history of alcohol use).   POCUS on admission 9/14 showed moderate to severe LV dysfunction with global hypokinesis  Now off pressor support. A-line removed  Repeat TTE on 9/16, 40-45%, improved reported TTE at [place] 25%.   Cardiac MRA done on 9/20 showing global hypokinesis and evidence of tissue inflammation but no signs of ischemia     Plan:  - OP cardiologist f/u    #Hypertension.   Per chart review, home lisinopril 40mg and HCTZ 25mg but has not taken for past 2 months, however unsure if patient has been taking medications at home   Now Off pressor support     Plan:   - c/w valsartan 20mg BID  - lopressor 12.5mg BID    #DIANDRA (acute kidney injury).   Resolved  Hess exchanged 9/14  At OSH noted to have increase in BUN and Cr with concern for anuria, making urine at [facility]   Suspect prerenal in setting of cardiogenic shock with possibly septic shock   Cr. 3.49 ----> 2.70 ---> 1.17 -> 1.14  Urine Lytes 9/15: Na<20, Cr:120, Urea: 647, Osm: 396     #Diabetes mellitus.   Poorly controlled; A1c 12% during [place] Admission   Per chart review, Home metformin 1000mg BID but has not taken for past 2 months    Plan:  -     #HLD  Per chart review, on a statin (unknown which one) but not taking past 2 months    Plan:  - c/w lipitor 40mg qhs.    Patient was discharged to: home    New medications:   Changes to old medications: None  Medications that were stopped: None    Items to follow up as outpatient: PCP, Pulmonology, Cardiology    Physical exam at the time of discharge:     General: Lying comfortably and in no acute distress  HEENT: NC/AT; EOMI, anicteric sclera; MMM  Neck: supple  Cardiovascular: +S1/S2, RRR  Respiratory: CTA B/L; no W/R/R  Gastrointestinal: soft, NT/ND; +BSx4  Extremities: WWP; no clubbing or cyanosis. Mild bilateral pitting edema  Vascular: 2+ radial pulses B/L  Neurological: AAOx3; no focal deficits  Psychiatric: pleasant mood and affect  Dermatologic: no appreciable wounds or damage to the skin    LABS & STUDIES:  SARS-CoV-2: Adrián (14 Sep 2024 21:35)   #Discharge: do not delete    38-year-old male hypertension, DM2, HLD, hepatitis admitted for treatment of acute hypoxic respiratory failure 2/2 hypersensitivity pneumonitis vs eosinophilic pneumonia, course complicated by cardiogenic shock, now of pressor support and improved oxygenation, admitted to floors for further management of AHRF 2/2 possible hypersensitivity pneumonitis vs. eosinophilic pneumonia.    Problem List/Main Diagnoses (system-based):   #Acute hypoxic respiratory failure.   Possibly 2/2 hypersensitivity pneumonitis vs eosinophilic pneumonia -- etiology is unclear with severe PNA and ARDS. Newly reduced EF with cardiogenic shock at [place] suggesting flash pulmonary edema as possible cause   Initially presented to [facility] on 9/10 for SOB, PERRY, cough x 2 weeks. Found to have increasing O2 requirements initially placed on NC > HFNC > BiPAP, eventually requiring intubation  CXR with multi-focal pneumonia.   Pt improved rapidly after treatment, which was highly concerning for hypersensitivity pneumonitis vs eosinophilic pneumonia  Patient was treated with CTX 9/10-9/14, Azithro 9/10-9/12, Bactrim 9/11-9/12, iv solumedrol 9/11-9/14 at OSH.   s/p Bronchoscopy 9/13; s/p zosyn 7d course (received half of it at [place])  Repeat CT- chest with improved bilateral infiltrates  Fungitell, autoimmune workup negative   Coxsackie A and B titers positive    Plan:  - OP pulmonology follow up in [place]    #Cardiogenic shock  Resolved  While at [facility] pt was found  to have EF 25% on TTE with global left ventricular hypokinesis requiring  levophed and dobutamine  Ddx include ischemic cardiomyopathy vs eosinophilic myocardiosis (given high suspension for eosinophilic pneumonia after improvement with IV steroids) vs alcohol induced cardiomyopathy (given history of alcohol use).   POCUS on admission 9/14 showed moderate to severe LV dysfunction with global hypokinesis  Now off pressor support. A-line removed  Repeat TTE on 9/16, 40-45%, improved reported TTE at [place] 25%.   Cardiac MRA done on 9/20 showing global hypokinesis and evidence of tissue inflammation but no signs of ischemia     Plan:  - OP cardiologist f/u  - c/w spironolactone 25 mg PO qd  - c/w lasix 20 mg PO qd     #Hypertension.   Per chart review, home lisinopril 40mg and HCTZ 25mg but has not taken for past 2 months, however unsure if patient has been taking medications at home   Now Off pressor support     Plan:   - hold lopressor, start coreg 6.25 mg PO BID     #DIANDRA (acute kidney injury).   Resolved  Hess exchanged 9/14  At OSH noted to have increase in BUN and Cr with concern for anuria, making urine at [facility]   Suspect prerenal in setting of cardiogenic shock with possibly septic shock   Cr. 3.49 ----> 2.70 ---> 1.17 -> 1.14  Urine Lytes 9/15: Na<20, Cr:120, Urea: 647, Osm: 396     #Diabetes mellitus.   Poorly controlled; A1c 12% during [place] Admission   Per chart review, Home metformin 1000mg BID but has not taken for past 2 months    Plan:  - d/c home with insulin: lantus 22 U and lispro 11 U before meals  - OP endocrinology follow up     #HLD  Per chart review, on a statin (unknown which one) but not taking past 2 months    Plan:  - c/w lipitor 40mg qhs.    Patient was discharged to: home    New medications: valsartan 160mg qd, coreg 6.35 mg BID  Changes to old medications:   Medications that were stopped: lopressor     Items to follow up as outpatient: PCP, Pulmonology, Cardiology    Physical exam at the time of discharge:     General: Lying comfortably and in no acute distress  HEENT: NC/AT; EOMI, anicteric sclera; MMM  Neck: supple  Cardiovascular: +S1/S2, RRR  Respiratory: CTA B/L; no W/R/R  Gastrointestinal: soft, NT/ND; +BSx4  Extremities: WWP; no clubbing or cyanosis. Mild bilateral pitting edema  Vascular: 2+ radial pulses B/L  Neurological: AAOx3; no focal deficits  Psychiatric: pleasant mood and affect  Dermatologic: no appreciable wounds or damage to the skin    LABS & STUDIES:  SARS-CoV-2: Adrián (14 Sep 2024 21:35)   #Discharge: do not delete    38-year-old male hypertension, DM2, HLD, hepatitis admitted for treatment of acute hypoxic respiratory failure 2/2 hypersensitivity pneumonitis vs eosinophilic pneumonia, course complicated by cardiogenic shock likely 2/2 myocarditis now resolved with improvement in cardiac output and EF.     Problem List/Main Diagnoses (system-based):   #Acute hypoxic respiratory failure.   Initially presented to [facility] on 9/10 for SOB, PERRY, cough x 2 weeks. Found to have increasing O2 requirements initially placed on NC > HFNC > BiPAP, eventually requiring intubation. Pt improved rapidly after treatment, which was highly concerning for hypersensitivity pneumonitis vs eosinophilic pneumonia. Patient was treated with CTX 9/10-9/14, Azithro 9/10-9/12, Bactrim 9/11-9/12, iv solumedrol 9/11-9/14 at OSH.   Possibly 2/2 hypersensitivity pneumonitis vs eosinophilic pneumonia -- etiology is unclear with severe PNA and ARDS. CXR with multi-focal pneumonia. Newly reduced EF with cardiogenic shock at [place] suggesting flash pulmonary edema as possible cause however cardiac workup showing signs of myocarditis.   Now s/p Bronchoscopy 9/13 and Zosyn 7d course with resolution. Off supplemental oxygen. Repeat CT- chest with improved bilateral infiltrates.   Fungitell, autoimmune workup negative. Coxsackie A and B titers positive.   - OP pulmonology follow up in [place] (patient was given information for [facility] in [place])    #Cardiogenic shock (resolved)  While at [facility] pt was found  to have EF 25% on TTE with global left ventricular hypokinesis requiring levophed and dobutamine, now off pressors. POCUS on admission 9/14 showed moderate to severe LV dysfunction with global hypokinesis. Repeat TTE on 9/16, 40-45%, improved reported TTE at [place] 25%.   Ddx include eosinophilic myocardiosis (given high suspension for eosinophilic pneumonia after improvement with IV steroids) vs alcohol induced cardiomyopathy (given history of alcohol use) vs ischemic cardiomyopathy.   Cardiac MRI done on 9/20 showing global hypokinesis and evidence of tissue inflammation ?changes seen in myocarditis, but no signs of ischemia.   - OP cardiologist f/u (patient given appointment for Dr. Newman but would like to follow up with cardiology in [facility])  - c/w Coreg 6.25mg PO twice a day  - c/w Valsartan 160mg PO daily  - c/w Lasix 20mg PO daily x2 weeks  - c/w Spironolactone 25 mg PO daily    #DIANDRA (acute kidney injury) -- resolved  Hess exchanged 9/14. At OSH noted to have increase in BUN and Cr with concern for anuria, making urine at [facility]   Suspect prerenal in setting of cardiogenic shock with possibly septic shock   Cr. 3.49 ----> 2.70 ---> 1.17 -> 1.14  Urine Lytes 9/15: Na<20, Cr:120, Urea: 647, Osm: 396     #Diabetes mellitus.   Poorly controlled; A1c 12% during [place] Admission   Per chart review, Home metformin 1000mg BID but has not taken for past 2 months  - c/w Lantus 22u at bedtime and Lispro 11u before meals  - OP endocrinology follow up     #HLD  Per chart review, on a statin (unknown which one) but not taking past 2 months  - c/w Lipitor 40mg qhs.    Patient was discharged to: home    New medications: valsartan 160mg qd, coreg 6.35 mg BID, Lasix 20mg PO qd x2 weeks, spironolactone 25 mg PO qd, Lantus 22u qhs, Lispro 11u TID  Medications that were stopped: lopressor     Items to follow up as outpatient: PCP, Pulmonology, Cardiology, Heart Failure    Physical exam at the time of discharge:   General: Lying comfortably and in no acute distress  HEENT: NC/AT; EOMI, anicteric sclera; MMM  Neck: supple  Cardiovascular: +S1/S2, RRR  Respiratory: CTA B/L; no W/R/R  Gastrointestinal: soft, NT/ND; +BSx4  Extremities: WWP; no clubbing or cyanosis. Mild bilateral pitting edema  Vascular: 2+ radial pulses B/L  Neurological: AAOx3; no focal deficits  Psychiatric: pleasant mood and affect  Dermatologic: no appreciable wounds or damage to the skin    LABS & STUDIES:  SARS-CoV-2: Adrián (14 Sep 2024 21:35)

## 2024-09-19 NOTE — PROGRESS NOTE ADULT - SUBJECTIVE AND OBJECTIVE BOX
PULMONARY CONSULT SERVICE FOLLOW-UP NOTE    INTERVAL HPI:  Reviewed chart and overnight events; patient seen and examined at bedside. NAD, no acute complaints. States that he had some orthopnea when he lying down flat last night. Reports that his cough has resolved completely. Denies fevers/chills. Noted to be on 5L NC this morning, breathing comfortably while sitting up, weaned down to 3L NC    MEDICATIONS:  Pulmonary:    Antimicrobials:    Anticoagulants:  heparin   Injectable 7500 Unit(s) SubCutaneous every 8 hours    Cardiac:  spironolactone 25 milliGRAM(s) Oral daily  valsartan 20 milliGRAM(s) Oral every 12 hours      Allergies    Cerritos (Stomach Upset)  Hazelnut (Stomach Upset)  No Known Drug Allergies    Intolerances        Vital Signs Last 24 Hrs  T(C): 36.6 (19 Sep 2024 05:23), Max: 37.5 (19 Sep 2024 01:00)  T(F): 97.8 (19 Sep 2024 05:23), Max: 99.5 (19 Sep 2024 01:00)  HR: 80 (19 Sep 2024 08:00) (70 - 90)  BP: 139/83 (19 Sep 2024 08:00) (115/67 - 140/93)  BP(mean): 106 (19 Sep 2024 08:00) (86 - 112)  RR: 16 (19 Sep 2024 08:00) (16 - 20)  SpO2: 94% (19 Sep 2024 08:00) (91% - 96%)    Parameters below as of 19 Sep 2024 08:00  Patient On (Oxygen Delivery Method): nasal cannula w/ humidification  O2 Flow (L/min): 6      09-18 @ 07:01  -  09-19 @ 07:00  --------------------------------------------------------  IN: 0 mL / OUT: 1400 mL / NET: -1400 mL          PHYSICAL EXAM:  Constitutional: WDWN, obese, appeared comfortable on HFNC   Head: NC/AT  EENT: PERRL, anicteric sclera; oropharynx clear, MMM  Neck: supple, no appreciable JVD  Respiratory: (+) decreased breath sounds b/l ; no W/R/R  Cardiovascular: +S1/S2, RRR  Gastrointestinal: soft, NT/ND  Extremities: (+) 1+ pitting edema b/l LEs. WWP; , clubbing or cyanosis  Vascular: 2+ radial pulses B/L  Neurological: awake and alert; JOHN[place]ON      LABS:      CBC Full  -  ( 19 Sep 2024 05:50 )  WBC Count : 11.50 K/uL  RBC Count : 4.86 M/uL  Hemoglobin : 13.8 g/dL  Hematocrit : 40.2 %  Platelet Count - Automated : 273 K/uL  Mean Cell Volume : 82.7 fl  Mean Cell Hemoglobin : 28.4 pg  Mean Cell Hemoglobin Concentration : 34.3 gm/dL  Auto Neutrophil # : 7.46 K/uL  Auto Lymphocyte # : 3.02 K/uL  Auto Monocyte # : 0.40 K/uL  Auto Eosinophil # : 0.40 K/uL  Auto Basophil # : 0.00 K/uL  Auto Neutrophil % : 64.9 %  Auto Lymphocyte % : 26.3 %  Auto Monocyte % : 3.5 %  Auto Eosinophil % : 3.5 %  Auto Basophil % : 0.0 %    09-19    138  |  104  |  28[H]  ----------------------------<  116[H]  3.3[L]   |  25  |  1.14    Ca    8.1[L]      19 Sep 2024 05:50  Phos  3.1     09-19  Mg     1.7     09-19    TPro  5.8[L]  /  Alb  2.7[L]  /  TBili  0.6  /  DBili  x   /  AST  12  /  ALT  18  /  AlkPhos  70  09-18          Urinalysis Basic - ( 19 Sep 2024 05:50 )    Color: x / Appearance: x / SG: x / pH: x  Gluc: 116 mg/dL / Ketone: x  / Bili: x / Urobili: x   Blood: x / Protein: x / Nitrite: x   Leuk Esterase: x / RBC: x / WBC x   Sq Epi: x / Non Sq Epi: x / Bacteria: x                RADIOLOGY & ADDITIONAL STUDIES:

## 2024-09-19 NOTE — PROGRESS NOTE ADULT - SUBJECTIVE AND OBJECTIVE BOX
**INCOMPLETE NOTE    INTERVAL/OVERNIGHT EVENTS: None    SUBJECTIVE:  Patient seen and examined at bedside, comfortable, NAD. Denied fever, chest pain, dyspnea, abdominal pain.     Vital Signs Last 12 Hrs  T(F): 97.8 (09-19-24 @ 05:23), Max: 99.5 (09-19-24 @ 01:00)  HR: 70 (09-19-24 @ 04:04) (70 - 82)  BP: 126/80 (09-19-24 @ 04:04) (122/76 - 140/93)  BP(mean): 99 (09-19-24 @ 04:04) (94 - 112)  RR: 16 (09-19-24 @ 04:04) (16 - 20)  SpO2: 96% (09-19-24 @ 04:04) (91% - 96%)  I&O's Summary    17 Sep 2024 07:01  -  18 Sep 2024 07:00  --------------------------------------------------------  IN: 0 mL / OUT: 1100 mL / NET: -1100 mL    18 Sep 2024 07:01  -  19 Sep 2024 06:20  --------------------------------------------------------  IN: 0 mL / OUT: 1400 mL / NET: -1400 mL        PHYSICAL EXAM:  General: NAD  HEENT: PERRL, EOM intact, sclera anicteric, MMM  Cardiovascular: RRR; no MRG;   Respiratory: CTAB; no WRR  GI/: soft; NTND; BS x4  Extremities: WWP; 2+ peripheral pulses bilaterally; no LE edema  Skin: normal color & turgor; no rash  Neurologic: aox3; no focal deficits    LABS:                        13.3   9.42  )-----------( 294      ( 18 Sep 2024 05:30 )             40.0     09-18    139  |  106  |  37[H]  ----------------------------<  172[H]  3.8   |  24  |  1.17    Ca    8.2[L]      18 Sep 2024 05:30  Phos  3.7     09-18  Mg     1.8     09-18    TPro  5.8[L]  /  Alb  2.7[L]  /  TBili  0.6  /  DBili  x   /  AST  12  /  ALT  18  /  AlkPhos  70  09-18      Urinalysis Basic - ( 18 Sep 2024 05:30 )    Color: x / Appearance: x / SG: x / pH: x  Gluc: 172 mg/dL / Ketone: x  / Bili: x / Urobili: x   Blood: x / Protein: x / Nitrite: x   Leuk Esterase: x / RBC: x / WBC x   Sq Epi: x / Non Sq Epi: x / Bacteria: x          RADIOLOGY & ADDITIONAL TESTS:    MEDICATIONS  (STANDING):  artificial  tears Solution 1 Drop(s) Both EYES four times a day  atorvastatin 40 milliGRAM(s) Oral at bedtime  chlorhexidine 2% Cloths 1 Application(s) Topical <User Schedule>  dextrose 5%. 1000 milliLiter(s) (50 mL/Hr) IV Continuous <Continuous>  dextrose 5%. 1000 milliLiter(s) (100 mL/Hr) IV Continuous <Continuous>  dextrose 50% Injectable 25 Gram(s) IV Push once  dextrose 50% Injectable 12.5 Gram(s) IV Push once  dextrose 50% Injectable 25 Gram(s) IV Push once  glucagon  Injectable 1 milliGRAM(s) IntraMuscular once  heparin   Injectable 7500 Unit(s) SubCutaneous every 8 hours  insulin glargine Injectable (LANTUS) 26 Unit(s) SubCutaneous at bedtime  insulin lispro (ADMELOG) corrective regimen sliding scale   SubCutaneous at bedtime  insulin lispro (ADMELOG) corrective regimen sliding scale   SubCutaneous three times a day before meals  insulin lispro Injectable (ADMELOG) 11 Unit(s) SubCutaneous three times a day before meals  methylPREDNISolone sodium succinate Injectable 60 milliGRAM(s) IV Push daily  pantoprazole    Tablet 40 milliGRAM(s) Oral before breakfast  sodium phosphate 15 milliMole(s)/250 mL IVPB 15 milliMole(s) IV Intermittent once  spironolactone 25 milliGRAM(s) Oral daily  valsartan 20 milliGRAM(s) Oral every 12 hours    MEDICATIONS  (PRN):  dextrose Oral Gel 15 Gram(s) Oral once PRN Blood Glucose LESS THAN 70 milliGRAM(s)/deciliter   -------------------TRANSFER FROM [facility] TO [facility]--------------------------------  Hospital course:  38yoM with a PMHx of HTN, DM2 (uncontrolled, A1c 12), HLD, hepatitis initially presented to [facility] 9/10 ER for cough for 2 weeks. Reported cough productive of yellow sputum, runny nose, subjective fevers as well and PERRY. [facility], tested him for COVID (negative), and sent him to the ER. Patient bought a home pulse oximeter that ranged from 89 to 93% this week. In the ED, the patient was found to have a multifocal pneumonia and started on treatment for CAP. He was initially on 3LNC on 9/10 but on 9/11 O2 req increased > HFNC. CT Chest 9/11 extensive multifocal consolidations > transferred to ICU > BiPAP > intubated 9/12. Treated with ARDS protocol, paralyzed, proned 9/12-9/13. ddx severe CAP vs  vs PJP. Treated with CTX 9/10-9/14, azithro 9/10-9/12, bactrim 9/11-9/12, iv solumedrol 9/11-9/14. Zosyn 4.5g started 9/13. Bronch 9/13, pending results. HIV neg. Reintubated 9/14.  decreased UOP. Course further c/b newly reduced EF 25% and cardiogenic shock req dobutamine. Admitted to MICU for further management. POCUS on admission 9/14: moderate to severe LV dysfunction with global hypokinesis. dobutamine discontinued 9/15. Stepped down to telemetry for management of acute hypoxic respiratory failure. 9/19, tapered methylprednisolone 50BID to 60 qd, also weaned from HFNC to 4L NC, saturating 94%. Started valsartan 20mg BID, spironolactone 25mg qd for GDMT optimization. 9/19, weaned to 2L O2, saturating 92-95% and comfortable. Patient is stable to be transferred to [facility] for management of AHRF 2/2 to possible hypersensitivity pneumonitis vs eosinophilic pneumonia.    INTERVAL/OVERNIGHT EVENTS: None    SUBJECTIVE:  Patient seen and examined at bedside, comfortable, NAD. Denied fever, chest pain, dyspnea, abdominal pain.     Vital Signs Last 12 Hrs  T(F): 97.8 (09-19-24 @ 05:23), Max: 99.5 (09-19-24 @ 01:00)  HR: 70 (09-19-24 @ 04:04) (70 - 82)  BP: 126/80 (09-19-24 @ 04:04) (122/76 - 140/93)  BP(mean): 99 (09-19-24 @ 04:04) (94 - 112)  RR: 16 (09-19-24 @ 04:04) (16 - 20)  SpO2: 96% (09-19-24 @ 04:04) (91% - 96%)  I&O's Summary    17 Sep 2024 07:01  -  18 Sep 2024 07:00  --------------------------------------------------------  IN: 0 mL / OUT: 1100 mL / NET: -1100 mL    18 Sep 2024 07:01  -  19 Sep 2024 06:20  --------------------------------------------------------  IN: 0 mL / OUT: 1400 mL / NET: -1400 mL        PHYSICAL EXAM:  General: NAD  HEENT: PERRL, EOM intact, sclera anicteric, MMM  Cardiovascular: RRR; no MRG;   Respiratory: decreased breath sounds BL  GI/: soft; NTND; BS x4  Extremities: WWP; 2+ peripheral pulses bilaterally  Skin: normal color & turgor; no rash  Neurologic: aox3; no focal deficits    LABS:                        13.3   9.42  )-----------( 294      ( 18 Sep 2024 05:30 )             40.0     09-18    139  |  106  |  37[H]  ----------------------------<  172[H]  3.8   |  24  |  1.17    Ca    8.2[L]      18 Sep 2024 05:30  Phos  3.7     09-18  Mg     1.8     09-18    TPro  5.8[L]  /  Alb  2.7[L]  /  TBili  0.6  /  DBili  x   /  AST  12  /  ALT  18  /  AlkPhos  70  09-18      Urinalysis Basic - ( 18 Sep 2024 05:30 )    Color: x / Appearance: x / SG: x / pH: x  Gluc: 172 mg/dL / Ketone: x  / Bili: x / Urobili: x   Blood: x / Protein: x / Nitrite: x   Leuk Esterase: x / RBC: x / WBC x   Sq Epi: x / Non Sq Epi: x / Bacteria: x          RADIOLOGY & ADDITIONAL TESTS:    MEDICATIONS  (STANDING):  artificial  tears Solution 1 Drop(s) Both EYES four times a day  atorvastatin 40 milliGRAM(s) Oral at bedtime  chlorhexidine 2% Cloths 1 Application(s) Topical <User Schedule>  dextrose 5%. 1000 milliLiter(s) (50 mL/Hr) IV Continuous <Continuous>  dextrose 5%. 1000 milliLiter(s) (100 mL/Hr) IV Continuous <Continuous>  dextrose 50% Injectable 25 Gram(s) IV Push once  dextrose 50% Injectable 12.5 Gram(s) IV Push once  dextrose 50% Injectable 25 Gram(s) IV Push once  glucagon  Injectable 1 milliGRAM(s) IntraMuscular once  heparin   Injectable 7500 Unit(s) SubCutaneous every 8 hours  insulin glargine Injectable (LANTUS) 26 Unit(s) SubCutaneous at bedtime  insulin lispro (ADMELOG) corrective regimen sliding scale   SubCutaneous at bedtime  insulin lispro (ADMELOG) corrective regimen sliding scale   SubCutaneous three times a day before meals  insulin lispro Injectable (ADMELOG) 11 Unit(s) SubCutaneous three times a day before meals  methylPREDNISolone sodium succinate Injectable 60 milliGRAM(s) IV Push daily  pantoprazole    Tablet 40 milliGRAM(s) Oral before breakfast  sodium phosphate 15 milliMole(s)/250 mL IVPB 15 milliMole(s) IV Intermittent once  spironolactone 25 milliGRAM(s) Oral daily  valsartan 20 milliGRAM(s) Oral every 12 hours    MEDICATIONS  (PRN):  dextrose Oral Gel 15 Gram(s) Oral once PRN Blood Glucose LESS THAN 70 milliGRAM(s)/deciliter   -------------------TRANSFER FROM [facility] TO [facility]--------------------------------  Hospital course:  38yoM with a PMHx of HTN, DM2 (uncontrolled, A1c 12), HLD, hepatitis initially presented to [facility] 9/10 ER for cough for 2 weeks. Reported cough productive of yellow sputum, runny nose, subjective fevers as well and PERRY. [facility], tested him for COVID (negative), and sent him to the ER. In the ED, the patient was found to have a multifocal pneumonia and started on treatment for CAP. He was initially on 3LNC on 9/10 but on 9/11 O2 req increased > HFNC. CT Chest 9/11 extensive multifocal consolidations > transferred to ICU > BiPAP > intubated 9/12. Treated with ARDS protocol, paralyzed, proned 9/12-9/13. ddx severe CAP vs  vs PJP. Treated with CTX 9/10-9/14, azithro 9/10-9/12, bactrim 9/11-9/12, iv solumedrol 9/11-9/14. Zosyn 4.5g started 9/13. Bronch 9/13, pending results. HIV neg. Reintubated 9/14.  decreased UOP. Course further c/b newly reduced EF 25% and cardiogenic shock req dobutamine. Admitted to MICU for further management. POCUS on admission 9/14: moderate to severe LV dysfunction with global hypokinesis. dobutamine discontinued 9/15. Stepped down to telemetry for management of acute hypoxic respiratory failure. 9/19, tapered methylprednisolone 50BID to 60 qd, also weaned from HFNC to 4L NC, saturating 94%. Started valsartan 20mg BID, spironolactone 25mg qd for GDMT optimization. 9/19, weaned to 2L O2, saturating 92-95% and comfortable. Patient is stable to be transferred to [facility] for management of AHRF 2/2 to possible hypersensitivity pneumonitis vs eosinophilic pneumonia.    INTERVAL/OVERNIGHT EVENTS: None    SUBJECTIVE:  Patient seen and examined at bedside, comfortable, NAD. Denied fever, chest pain, dyspnea, abdominal pain.     Vital Signs Last 12 Hrs  T(F): 97.8 (09-19-24 @ 05:23), Max: 99.5 (09-19-24 @ 01:00)  HR: 70 (09-19-24 @ 04:04) (70 - 82)  BP: 126/80 (09-19-24 @ 04:04) (122/76 - 140/93)  BP(mean): 99 (09-19-24 @ 04:04) (94 - 112)  RR: 16 (09-19-24 @ 04:04) (16 - 20)  SpO2: 96% (09-19-24 @ 04:04) (91% - 96%)  I&O's Summary    17 Sep 2024 07:01  -  18 Sep 2024 07:00  --------------------------------------------------------  IN: 0 mL / OUT: 1100 mL / NET: -1100 mL    18 Sep 2024 07:01  -  19 Sep 2024 06:20  --------------------------------------------------------  IN: 0 mL / OUT: 1400 mL / NET: -1400 mL        PHYSICAL EXAM:  General: NAD  HEENT: PERRL, EOM intact, sclera anicteric, MMM  Cardiovascular: RRR; no MRG;   Respiratory: decreased breath sounds BL  GI/: soft; NTND; BS x4  Extremities: WWP; 2+ peripheral pulses bilaterally  Skin: normal color & turgor; no rash  Neurologic: aox3; no focal deficits    LABS:                        13.3   9.42  )-----------( 294      ( 18 Sep 2024 05:30 )             40.0     09-18    139  |  106  |  37[H]  ----------------------------<  172[H]  3.8   |  24  |  1.17    Ca    8.2[L]      18 Sep 2024 05:30  Phos  3.7     09-18  Mg     1.8     09-18    TPro  5.8[L]  /  Alb  2.7[L]  /  TBili  0.6  /  DBili  x   /  AST  12  /  ALT  18  /  AlkPhos  70  09-18      Urinalysis Basic - ( 18 Sep 2024 05:30 )    Color: x / Appearance: x / SG: x / pH: x  Gluc: 172 mg/dL / Ketone: x  / Bili: x / Urobili: x   Blood: x / Protein: x / Nitrite: x   Leuk Esterase: x / RBC: x / WBC x   Sq Epi: x / Non Sq Epi: x / Bacteria: x          RADIOLOGY & ADDITIONAL TESTS:    MEDICATIONS  (STANDING):  artificial  tears Solution 1 Drop(s) Both EYES four times a day  atorvastatin 40 milliGRAM(s) Oral at bedtime  chlorhexidine 2% Cloths 1 Application(s) Topical <User Schedule>  dextrose 5%. 1000 milliLiter(s) (50 mL/Hr) IV Continuous <Continuous>  dextrose 5%. 1000 milliLiter(s) (100 mL/Hr) IV Continuous <Continuous>  dextrose 50% Injectable 25 Gram(s) IV Push once  dextrose 50% Injectable 12.5 Gram(s) IV Push once  dextrose 50% Injectable 25 Gram(s) IV Push once  glucagon  Injectable 1 milliGRAM(s) IntraMuscular once  heparin   Injectable 7500 Unit(s) SubCutaneous every 8 hours  insulin glargine Injectable (LANTUS) 26 Unit(s) SubCutaneous at bedtime  insulin lispro (ADMELOG) corrective regimen sliding scale   SubCutaneous at bedtime  insulin lispro (ADMELOG) corrective regimen sliding scale   SubCutaneous three times a day before meals  insulin lispro Injectable (ADMELOG) 11 Unit(s) SubCutaneous three times a day before meals  methylPREDNISolone sodium succinate Injectable 60 milliGRAM(s) IV Push daily  pantoprazole    Tablet 40 milliGRAM(s) Oral before breakfast  sodium phosphate 15 milliMole(s)/250 mL IVPB 15 milliMole(s) IV Intermittent once  spironolactone 25 milliGRAM(s) Oral daily  valsartan 20 milliGRAM(s) Oral every 12 hours    MEDICATIONS  (PRN):  dextrose Oral Gel 15 Gram(s) Oral once PRN Blood Glucose LESS THAN 70 milliGRAM(s)/deciliter   -------------------TRANSFER FROM [facility] TO [facility]--------------------------------  Hospital course:  38yoM with a PMHx of HTN, DM2 (uncontrolled, A1c 12), HLD, hepatitis initially presented to [facility] 9/10 ER for cough for 2 weeks. Reported cough productive of yellow sputum, runny nose, subjective fevers as well and PERRY. [facility], tested him for COVID (negative), and sent him to the ER. In the ED, the patient was found to have a multifocal pneumonia and started on treatment for CAP. He was initially on 3LNC on 9/10 but on 9/11 O2 req increased > HFNC. CT Chest 9/11 extensive multifocal consolidations > transferred to ICU > BiPAP > intubated 9/12. Treated with ARDS protocol, paralyzed, proned 9/12-9/13. ddx severe CAP vs  vs PJP. Treated with CTX 9/10-9/14, azithro 9/10-9/12, bactrim 9/11-9/12, iv solumedrol 9/11-9/14. Zosyn 4.5g started 9/13. Bronch 9/13, pending results. HIV neg. Reintubated 9/14.  decreased UOP. Course further c/b newly reduced EF 25% and cardiogenic shock req dobutamine. Admitted to MICU for further management. POCUS on admission 9/14: moderate to severe LV dysfunction with global hypokinesis. dobutamine discontinued 9/15. Stepped down to telemetry for management of acute hypoxic respiratory failure. 9/19, tapered methylprednisolone 50BID to 60mg qd, also weaned from HFNC to 4L NC, saturating 94%. On valsartan 40mg BID, spironolactone 25mg qd, lopressor 12.5mg BID, lasix 20mg qd for GDMT optimization. 9/19, weaned to 2L O2, saturating 92-95% and comfortable. Patient is stable to be transferred to [facility] for management of AHRF 2/2 to possible hypersensitivity pneumonitis vs eosinophilic pneumonia.    INTERVAL/OVERNIGHT EVENTS: None    SUBJECTIVE:  Patient seen and examined at bedside, comfortable, NAD. Denied fever, chest pain, dyspnea, abdominal pain.     Vital Signs Last 12 Hrs  T(F): 97.8 (09-19-24 @ 05:23), Max: 99.5 (09-19-24 @ 01:00)  HR: 70 (09-19-24 @ 04:04) (70 - 82)  BP: 126/80 (09-19-24 @ 04:04) (122/76 - 140/93)  BP(mean): 99 (09-19-24 @ 04:04) (94 - 112)  RR: 16 (09-19-24 @ 04:04) (16 - 20)  SpO2: 96% (09-19-24 @ 04:04) (91% - 96%)  I&O's Summary    17 Sep 2024 07:01  -  18 Sep 2024 07:00  --------------------------------------------------------  IN: 0 mL / OUT: 1100 mL / NET: -1100 mL    18 Sep 2024 07:01  -  19 Sep 2024 06:20  --------------------------------------------------------  IN: 0 mL / OUT: 1400 mL / NET: -1400 mL        PHYSICAL EXAM:  General: NAD  HEENT: PERRL, EOM intact, sclera anicteric, MMM  Cardiovascular: RRR; no MRG;   Respiratory: decreased breath sounds BL  GI/: soft; NTND; BS x4  Extremities: WWP; 2+ peripheral pulses bilaterally  Skin: normal color & turgor; no rash  Neurologic: aox3; no focal deficits    LABS:                        13.3   9.42  )-----------( 294      ( 18 Sep 2024 05:30 )             40.0     09-18    139  |  106  |  37[H]  ----------------------------<  172[H]  3.8   |  24  |  1.17    Ca    8.2[L]      18 Sep 2024 05:30  Phos  3.7     09-18  Mg     1.8     09-18    TPro  5.8[L]  /  Alb  2.7[L]  /  TBili  0.6  /  DBili  x   /  AST  12  /  ALT  18  /  AlkPhos  70  09-18      Urinalysis Basic - ( 18 Sep 2024 05:30 )    Color: x / Appearance: x / SG: x / pH: x  Gluc: 172 mg/dL / Ketone: x  / Bili: x / Urobili: x   Blood: x / Protein: x / Nitrite: x   Leuk Esterase: x / RBC: x / WBC x   Sq Epi: x / Non Sq Epi: x / Bacteria: x          RADIOLOGY & ADDITIONAL TESTS:    MEDICATIONS  (STANDING):  artificial  tears Solution 1 Drop(s) Both EYES four times a day  atorvastatin 40 milliGRAM(s) Oral at bedtime  chlorhexidine 2% Cloths 1 Application(s) Topical <User Schedule>  dextrose 5%. 1000 milliLiter(s) (50 mL/Hr) IV Continuous <Continuous>  dextrose 5%. 1000 milliLiter(s) (100 mL/Hr) IV Continuous <Continuous>  dextrose 50% Injectable 25 Gram(s) IV Push once  dextrose 50% Injectable 12.5 Gram(s) IV Push once  dextrose 50% Injectable 25 Gram(s) IV Push once  glucagon  Injectable 1 milliGRAM(s) IntraMuscular once  heparin   Injectable 7500 Unit(s) SubCutaneous every 8 hours  insulin glargine Injectable (LANTUS) 26 Unit(s) SubCutaneous at bedtime  insulin lispro (ADMELOG) corrective regimen sliding scale   SubCutaneous at bedtime  insulin lispro (ADMELOG) corrective regimen sliding scale   SubCutaneous three times a day before meals  insulin lispro Injectable (ADMELOG) 11 Unit(s) SubCutaneous three times a day before meals  methylPREDNISolone sodium succinate Injectable 60 milliGRAM(s) IV Push daily  pantoprazole    Tablet 40 milliGRAM(s) Oral before breakfast  sodium phosphate 15 milliMole(s)/250 mL IVPB 15 milliMole(s) IV Intermittent once  spironolactone 25 milliGRAM(s) Oral daily  valsartan 20 milliGRAM(s) Oral every 12 hours    MEDICATIONS  (PRN):  dextrose Oral Gel 15 Gram(s) Oral once PRN Blood Glucose LESS THAN 70 milliGRAM(s)/deciliter   -------------------TRANSFER FROM [facility] TO [facility]--------------------------------  Hospital course:  38yoM with a PMHx of HTN, DM2 (uncontrolled, A1c 12), HLD, hepatitis initially presented to [facility] 9/10 ER for cough for 2 weeks. Reported cough productive of yellow sputum, runny nose, subjective fevers as well and PERRY. [facility], tested him for COVID (negative), and sent him to the ER. In the ED, the patient was found to have a multifocal pneumonia and started on treatment for CAP. He was initially on 3LNC on 9/10 but on 9/11 O2 req increased > HFNC. CT Chest 9/11 extensive multifocal consolidations > transferred to ICU > BiPAP > intubated 9/12. Treated with ARDS protocol, paralyzed, proned 9/12-9/13. ddx severe CAP vs  vs PJP. Treated with CTX 9/10-9/14, azithro 9/10-9/12, bactrim 9/11-9/12, iv solumedrol 9/11-9/14. Zosyn 4.5g for 7d completed. Bronch 9/13, pending results. HIV neg. Reintubated 9/14.  decreased UOP. Course further c/b newly reduced EF 25% and cardiogenic shock req dobutamine. Admitted to MICU for further management. POCUS on admission 9/14: moderate to severe LV dysfunction with global hypokinesis. dobutamine discontinued 9/15. Stepped down to telemetry for management of acute hypoxic respiratory failure. 9/19, tapered methylprednisolone 50BID to 60mg qd, also weaned from HFNC to 4L NC, saturating 94%. On valsartan 40mg BID, spironolactone 25mg qd, lopressor 12.5mg BID, lasix 20mg qd for GDMT optimization. 9/19, weaned to 2L O2, saturating 92-95% and comfortable. Patient is stable to be transferred to [facility] for management of AHRF 2/2 to possible hypersensitivity pneumonitis vs eosinophilic pneumonia.    INTERVAL/OVERNIGHT EVENTS: None    SUBJECTIVE:  Patient seen and examined at bedside, comfortable, NAD. Denied fever, chest pain, dyspnea, abdominal pain.     Vital Signs Last 12 Hrs  T(F): 97.8 (09-19-24 @ 05:23), Max: 99.5 (09-19-24 @ 01:00)  HR: 70 (09-19-24 @ 04:04) (70 - 82)  BP: 126/80 (09-19-24 @ 04:04) (122/76 - 140/93)  BP(mean): 99 (09-19-24 @ 04:04) (94 - 112)  RR: 16 (09-19-24 @ 04:04) (16 - 20)  SpO2: 96% (09-19-24 @ 04:04) (91% - 96%)  I&O's Summary    17 Sep 2024 07:01  -  18 Sep 2024 07:00  --------------------------------------------------------  IN: 0 mL / OUT: 1100 mL / NET: -1100 mL    18 Sep 2024 07:01  -  19 Sep 2024 06:20  --------------------------------------------------------  IN: 0 mL / OUT: 1400 mL / NET: -1400 mL        PHYSICAL EXAM:  General: NAD  HEENT: PERRL, EOM intact, sclera anicteric, MMM  Cardiovascular: RRR; no MRG;   Respiratory: decreased breath sounds BL  GI/: soft; NTND; BS x4  Extremities: WWP; 2+ peripheral pulses bilaterally  Skin: normal color & turgor; no rash  Neurologic: aox3; no focal deficits    LABS:                        13.3   9.42  )-----------( 294      ( 18 Sep 2024 05:30 )             40.0     09-18    139  |  106  |  37[H]  ----------------------------<  172[H]  3.8   |  24  |  1.17    Ca    8.2[L]      18 Sep 2024 05:30  Phos  3.7     09-18  Mg     1.8     09-18    TPro  5.8[L]  /  Alb  2.7[L]  /  TBili  0.6  /  DBili  x   /  AST  12  /  ALT  18  /  AlkPhos  70  09-18      Urinalysis Basic - ( 18 Sep 2024 05:30 )    Color: x / Appearance: x / SG: x / pH: x  Gluc: 172 mg/dL / Ketone: x  / Bili: x / Urobili: x   Blood: x / Protein: x / Nitrite: x   Leuk Esterase: x / RBC: x / WBC x   Sq Epi: x / Non Sq Epi: x / Bacteria: x          RADIOLOGY & ADDITIONAL TESTS:    MEDICATIONS  (STANDING):  artificial  tears Solution 1 Drop(s) Both EYES four times a day  atorvastatin 40 milliGRAM(s) Oral at bedtime  chlorhexidine 2% Cloths 1 Application(s) Topical <User Schedule>  dextrose 5%. 1000 milliLiter(s) (50 mL/Hr) IV Continuous <Continuous>  dextrose 5%. 1000 milliLiter(s) (100 mL/Hr) IV Continuous <Continuous>  dextrose 50% Injectable 25 Gram(s) IV Push once  dextrose 50% Injectable 12.5 Gram(s) IV Push once  dextrose 50% Injectable 25 Gram(s) IV Push once  glucagon  Injectable 1 milliGRAM(s) IntraMuscular once  heparin   Injectable 7500 Unit(s) SubCutaneous every 8 hours  insulin glargine Injectable (LANTUS) 26 Unit(s) SubCutaneous at bedtime  insulin lispro (ADMELOG) corrective regimen sliding scale   SubCutaneous at bedtime  insulin lispro (ADMELOG) corrective regimen sliding scale   SubCutaneous three times a day before meals  insulin lispro Injectable (ADMELOG) 11 Unit(s) SubCutaneous three times a day before meals  methylPREDNISolone sodium succinate Injectable 60 milliGRAM(s) IV Push daily  pantoprazole    Tablet 40 milliGRAM(s) Oral before breakfast  sodium phosphate 15 milliMole(s)/250 mL IVPB 15 milliMole(s) IV Intermittent once  spironolactone 25 milliGRAM(s) Oral daily  valsartan 20 milliGRAM(s) Oral every 12 hours    MEDICATIONS  (PRN):  dextrose Oral Gel 15 Gram(s) Oral once PRN Blood Glucose LESS THAN 70 milliGRAM(s)/deciliter   -------------------TRANSFER FROM [facility] TO [facility]--------------------------------  Hospital course:  38yoM with a PMHx of HTN, DM2 (uncontrolled, A1c 12), HLD, hepatitis initially presented to [facility] 9/10 ER for cough for 2 weeks. Reported cough productive of yellow sputum, runny nose, subjective fevers as well and PERRY. [facility], tested him for COVID (negative), and sent him to the ER. In the ED, the patient was found to have a multifocal pneumonia and started on treatment for CAP. He was initially on 3LNC on 9/10 but on 9/11 O2 req increased > HFNC. CT Chest 9/11 extensive multifocal consolidations > transferred to ICU > BiPAP > intubated 9/12. Treated with ARDS protocol, paralyzed, proned 9/12-9/13. ddx severe CAP vs  vs PJP. Treated with CTX 9/10-9/14, azithro 9/10-9/12, bactrim 9/11-9/12, iv solumedrol 9/11-9/14. Zosyn 4.5g for 7d completed. Bronch 9/13, pending results. HIV neg. Reintubated 9/14.  decreased UOP. Course further c/b newly reduced EF 25% and cardiogenic shock req dobutamine. Admitted to MICU for further management. POCUS on admission 9/14: moderate to severe LV dysfunction with global hypokinesis. dobutamine discontinued 9/15. Stepped down to telemetry for management of acute hypoxic respiratory failure. 9/19, tapered methylprednisolone 50BID to 60mg qd, also weaned from HFNC to 4L NC, saturating 94%. On valsartan 40mg BID, spironolactone 25mg qd, lopressor 12.5mg BID, lasix 20mg qd for GDMT optimization. 9/19, weaned to 2L O2, saturating 92-95% and comfortable. Coxsackie A and B titers positive. Patient is stable to be transferred to [facility] for management of AHRF 2/2 to possible hypersensitivity pneumonitis vs eosinophilic pneumonia.    INTERVAL/OVERNIGHT EVENTS: None    SUBJECTIVE:  Patient seen and examined at bedside, comfortable, NAD. Denied fever, chest pain, dyspnea, abdominal pain.     Vital Signs Last 12 Hrs  T(F): 97.8 (09-19-24 @ 05:23), Max: 99.5 (09-19-24 @ 01:00)  HR: 70 (09-19-24 @ 04:04) (70 - 82)  BP: 126/80 (09-19-24 @ 04:04) (122/76 - 140/93)  BP(mean): 99 (09-19-24 @ 04:04) (94 - 112)  RR: 16 (09-19-24 @ 04:04) (16 - 20)  SpO2: 96% (09-19-24 @ 04:04) (91% - 96%)  I&O's Summary    17 Sep 2024 07:01  -  18 Sep 2024 07:00  --------------------------------------------------------  IN: 0 mL / OUT: 1100 mL / NET: -1100 mL    18 Sep 2024 07:01  -  19 Sep 2024 06:20  --------------------------------------------------------  IN: 0 mL / OUT: 1400 mL / NET: -1400 mL        PHYSICAL EXAM:  General: NAD  HEENT: PERRL, EOM intact, sclera anicteric, MMM  Cardiovascular: RRR; no MRG;   Respiratory: decreased breath sounds BL  GI/: soft; NTND; BS x4  Extremities: WWP; 2+ peripheral pulses bilaterally  Skin: normal color & turgor; no rash  Neurologic: aox3; no focal deficits    LABS:                        13.3   9.42  )-----------( 294      ( 18 Sep 2024 05:30 )             40.0     09-18    139  |  106  |  37[H]  ----------------------------<  172[H]  3.8   |  24  |  1.17    Ca    8.2[L]      18 Sep 2024 05:30  Phos  3.7     09-18  Mg     1.8     09-18    TPro  5.8[L]  /  Alb  2.7[L]  /  TBili  0.6  /  DBili  x   /  AST  12  /  ALT  18  /  AlkPhos  70  09-18      Urinalysis Basic - ( 18 Sep 2024 05:30 )    Color: x / Appearance: x / SG: x / pH: x  Gluc: 172 mg/dL / Ketone: x  / Bili: x / Urobili: x   Blood: x / Protein: x / Nitrite: x   Leuk Esterase: x / RBC: x / WBC x   Sq Epi: x / Non Sq Epi: x / Bacteria: x          RADIOLOGY & ADDITIONAL TESTS:    MEDICATIONS  (STANDING):  artificial  tears Solution 1 Drop(s) Both EYES four times a day  atorvastatin 40 milliGRAM(s) Oral at bedtime  chlorhexidine 2% Cloths 1 Application(s) Topical <User Schedule>  dextrose 5%. 1000 milliLiter(s) (50 mL/Hr) IV Continuous <Continuous>  dextrose 5%. 1000 milliLiter(s) (100 mL/Hr) IV Continuous <Continuous>  dextrose 50% Injectable 25 Gram(s) IV Push once  dextrose 50% Injectable 12.5 Gram(s) IV Push once  dextrose 50% Injectable 25 Gram(s) IV Push once  glucagon  Injectable 1 milliGRAM(s) IntraMuscular once  heparin   Injectable 7500 Unit(s) SubCutaneous every 8 hours  insulin glargine Injectable (LANTUS) 26 Unit(s) SubCutaneous at bedtime  insulin lispro (ADMELOG) corrective regimen sliding scale   SubCutaneous at bedtime  insulin lispro (ADMELOG) corrective regimen sliding scale   SubCutaneous three times a day before meals  insulin lispro Injectable (ADMELOG) 11 Unit(s) SubCutaneous three times a day before meals  methylPREDNISolone sodium succinate Injectable 60 milliGRAM(s) IV Push daily  pantoprazole    Tablet 40 milliGRAM(s) Oral before breakfast  sodium phosphate 15 milliMole(s)/250 mL IVPB 15 milliMole(s) IV Intermittent once  spironolactone 25 milliGRAM(s) Oral daily  valsartan 20 milliGRAM(s) Oral every 12 hours    MEDICATIONS  (PRN):  dextrose Oral Gel 15 Gram(s) Oral once PRN Blood Glucose LESS THAN 70 milliGRAM(s)/deciliter

## 2024-09-19 NOTE — PROGRESS NOTE ADULT - ATTENDING COMMENTS
38M with HTN, poorly-controlled DM2 A1c 11.6%, HLD who was initially admitted at OSH for AHRF requiring intubation, course complicated by mixed septic vs cardiogenic shock. Was intubated at [facility] for severe multifocal PNA, treated with ABx and IV steroids and then later re-intubated seemingly in the setting of cardiogenic shock with echo showing newly reduced LV EF 25%. Was on inotropic support but now weaned off, repeat echo today with improved LV EF 40-45%, GDMT has been started. Patient has been weaned down to 2LNC and transitioned to PO steroids 38M with HTN, poorly-controlled DM2 A1c 11.6%, HLD who was initially admitted at OSH for AHRF requiring intubation, course complicated by mixed septic vs cardiogenic shock.    Was intubated at [facility] for severe multifocal PNA, treated with ABx and IV steroids and then later re-intubated seemingly in the setting of cardiogenic shock with echo showing newly reduced LV EF 25%. Was on inotropic support but now weaned off, repeat echo today with improved LV EF 40-45%, GDMT has been started. Etiology of cardiogenic shock is unclear at this point, and ischemic evaluation is deferred for now due to ATN (which is resolving). Patient is pending a cMRI for further evaluation of myocarditis vs scar.    Patient has been weaned down to 2LNC and transitioned to PO steroids, continued on Zosyn for now for total 7 day course. Clinically well-appearing, ambulated with PT today without issues. Normal WOB on 2LNC, lungs CTAB. Large body habitus. Bilateral LE with 2+ pitting edema up to shins.    #AHRF requiring intubation  #new HFrEF LV EF 20-25% --> 40-45%  #poorly-controlled DM2 #HTN #HLD  - GDMT: valsartan 40 q12h, lopressor 12.5 q12  - starting lasix 20 PO  - cMRI  - prednisone 40 tomorrow, taper by half daily  - zosyn to complete 7 day course (9/13- )  - Appreciate Cards, Endo, Pulm

## 2024-09-20 ENCOUNTER — APPOINTMENT (OUTPATIENT)
Dept: PULMONOLOGY | Facility: CLINIC | Age: 39
End: 2024-09-20

## 2024-09-20 LAB
ALBUMIN SERPL ELPH-MCNC: 2.6 G/DL — LOW (ref 3.3–5)
ALP SERPL-CCNC: 66 U/L — SIGNIFICANT CHANGE UP (ref 40–120)
ALT FLD-CCNC: 18 U/L — SIGNIFICANT CHANGE UP (ref 10–45)
ANION GAP SERPL CALC-SCNC: 12 MMOL/L — SIGNIFICANT CHANGE UP (ref 5–17)
AST SERPL-CCNC: 19 U/L — SIGNIFICANT CHANGE UP (ref 10–40)
BASOPHILS # BLD AUTO: 0.05 K/UL — SIGNIFICANT CHANGE UP (ref 0–0.2)
BASOPHILS NFR BLD AUTO: 0.4 % — SIGNIFICANT CHANGE UP (ref 0–2)
BILIRUB SERPL-MCNC: 0.5 MG/DL — SIGNIFICANT CHANGE UP (ref 0.2–1.2)
BUN SERPL-MCNC: 21 MG/DL — SIGNIFICANT CHANGE UP (ref 7–23)
C PEPTIDE SERPL-MCNC: 3.3 NG/ML — SIGNIFICANT CHANGE UP (ref 1.1–4.4)
CALCIUM SERPL-MCNC: 8.4 MG/DL — SIGNIFICANT CHANGE UP (ref 8.4–10.5)
CHLORIDE SERPL-SCNC: 105 MMOL/L — SIGNIFICANT CHANGE UP (ref 96–108)
CO2 SERPL-SCNC: 19 MMOL/L — LOW (ref 22–31)
CREAT SERPL-MCNC: 0.92 MG/DL — SIGNIFICANT CHANGE UP (ref 0.5–1.3)
EGFR: 109 ML/MIN/1.73M2 — SIGNIFICANT CHANGE UP
EOSINOPHIL # BLD AUTO: 0.16 K/UL — SIGNIFICANT CHANGE UP (ref 0–0.5)
EOSINOPHIL NFR BLD AUTO: 1.3 % — SIGNIFICANT CHANGE UP (ref 0–6)
GLUCOSE BLDC GLUCOMTR-MCNC: 132 MG/DL — HIGH (ref 70–99)
GLUCOSE BLDC GLUCOMTR-MCNC: 157 MG/DL — HIGH (ref 70–99)
GLUCOSE BLDC GLUCOMTR-MCNC: 172 MG/DL — HIGH (ref 70–99)
GLUCOSE BLDC GLUCOMTR-MCNC: 190 MG/DL — HIGH (ref 70–99)
GLUCOSE SERPL-MCNC: 97 MG/DL — SIGNIFICANT CHANGE UP (ref 70–99)
HCT VFR BLD CALC: 43.7 % — SIGNIFICANT CHANGE UP (ref 39–50)
HGB BLD-MCNC: 14.4 G/DL — SIGNIFICANT CHANGE UP (ref 13–17)
IMM GRANULOCYTES NFR BLD AUTO: 2.8 % — HIGH (ref 0–0.9)
LYMPHOCYTES # BLD AUTO: 2.54 K/UL — SIGNIFICANT CHANGE UP (ref 1–3.3)
LYMPHOCYTES # BLD AUTO: 20.6 % — SIGNIFICANT CHANGE UP (ref 13–44)
MAGNESIUM SERPL-MCNC: 1.8 MG/DL — SIGNIFICANT CHANGE UP (ref 1.6–2.6)
MCHC RBC-ENTMCNC: 27.5 PG — SIGNIFICANT CHANGE UP (ref 27–34)
MCHC RBC-ENTMCNC: 33 GM/DL — SIGNIFICANT CHANGE UP (ref 32–36)
MCV RBC AUTO: 83.4 FL — SIGNIFICANT CHANGE UP (ref 80–100)
MONOCYTES # BLD AUTO: 0.93 K/UL — HIGH (ref 0–0.9)
MONOCYTES NFR BLD AUTO: 7.6 % — SIGNIFICANT CHANGE UP (ref 2–14)
NEUTROPHILS # BLD AUTO: 8.29 K/UL — HIGH (ref 1.8–7.4)
NEUTROPHILS NFR BLD AUTO: 67.3 % — SIGNIFICANT CHANGE UP (ref 43–77)
NRBC # BLD: 0 /100 WBCS — SIGNIFICANT CHANGE UP (ref 0–0)
PHOSPHATE SERPL-MCNC: 3.3 MG/DL — SIGNIFICANT CHANGE UP (ref 2.5–4.5)
PLATELET # BLD AUTO: 294 K/UL — SIGNIFICANT CHANGE UP (ref 150–400)
POTASSIUM SERPL-MCNC: 4.3 MMOL/L — SIGNIFICANT CHANGE UP (ref 3.5–5.3)
POTASSIUM SERPL-SCNC: 4.3 MMOL/L — SIGNIFICANT CHANGE UP (ref 3.5–5.3)
PROT SERPL-MCNC: 5.5 G/DL — LOW (ref 6–8.3)
RBC # BLD: 5.24 M/UL — SIGNIFICANT CHANGE UP (ref 4.2–5.8)
RBC # FLD: 11.9 % — SIGNIFICANT CHANGE UP (ref 10.3–14.5)
SODIUM SERPL-SCNC: 136 MMOL/L — SIGNIFICANT CHANGE UP (ref 135–145)
WBC # BLD: 12.31 K/UL — HIGH (ref 3.8–10.5)
WBC # FLD AUTO: 12.31 K/UL — HIGH (ref 3.8–10.5)

## 2024-09-20 PROCEDURE — 75561 CARDIAC MRI FOR MORPH W/DYE: CPT | Mod: 26

## 2024-09-20 PROCEDURE — 99233 SBSQ HOSP IP/OBS HIGH 50: CPT

## 2024-09-20 RX ORDER — PREDNISONE 5 MG/1
10 TABLET ORAL ONCE
Refills: 0 | Status: COMPLETED | OUTPATIENT
Start: 2024-09-22 | End: 2024-09-22

## 2024-09-20 RX ORDER — FUROSEMIDE 10 MG/ML
20 INJECTION INTRAVENOUS ONCE
Refills: 0 | Status: COMPLETED | OUTPATIENT
Start: 2024-09-20 | End: 2024-09-20

## 2024-09-20 RX ORDER — MAGNESIUM SULFATE 500 MG/ML
2 VIAL (ML) INJECTION ONCE
Refills: 0 | Status: COMPLETED | OUTPATIENT
Start: 2024-09-20 | End: 2024-09-20

## 2024-09-20 RX ORDER — METOPROLOL TARTRATE 50 MG
12.5 TABLET ORAL ONCE
Refills: 0 | Status: COMPLETED | OUTPATIENT
Start: 2024-09-20 | End: 2024-09-20

## 2024-09-20 RX ORDER — METOPROLOL TARTRATE 50 MG
25 TABLET ORAL EVERY 24 HOURS
Refills: 0 | Status: DISCONTINUED | OUTPATIENT
Start: 2024-09-21 | End: 2024-09-23

## 2024-09-20 RX ORDER — VALSARTAN 320 MG/1
80 TABLET ORAL EVERY 12 HOURS
Refills: 0 | Status: DISCONTINUED | OUTPATIENT
Start: 2024-09-20 | End: 2024-09-23

## 2024-09-20 RX ORDER — PREDNISONE 5 MG/1
20 TABLET ORAL ONCE
Refills: 0 | Status: COMPLETED | OUTPATIENT
Start: 2024-09-21 | End: 2024-09-21

## 2024-09-20 RX ADMIN — Medication 25 GRAM(S): at 10:01

## 2024-09-20 RX ADMIN — Medication 12.5 MILLIGRAM(S): at 18:10

## 2024-09-20 RX ADMIN — Medication 1 DROP(S): at 06:34

## 2024-09-20 RX ADMIN — VALSARTAN 80 MILLIGRAM(S): 320 TABLET ORAL at 21:48

## 2024-09-20 RX ADMIN — ATORVASTATIN CALCIUM 40 MILLIGRAM(S): 10 TABLET, FILM COATED ORAL at 22:05

## 2024-09-20 RX ADMIN — Medication 11 UNIT(S): at 13:59

## 2024-09-20 RX ADMIN — Medication 7500 UNIT(S): at 06:33

## 2024-09-20 RX ADMIN — Medication 7500 UNIT(S): at 13:59

## 2024-09-20 RX ADMIN — FUROSEMIDE 20 MILLIGRAM(S): 10 INJECTION INTRAVENOUS at 16:25

## 2024-09-20 RX ADMIN — SPIRONOLACTONE 25 MILLIGRAM(S): 100 TABLET, FILM COATED ORAL at 06:33

## 2024-09-20 RX ADMIN — Medication 2: at 13:59

## 2024-09-20 RX ADMIN — PANTOPRAZOLE SODIUM 40 MILLIGRAM(S): 40 TABLET, DELAYED RELEASE ORAL at 06:34

## 2024-09-20 RX ADMIN — Medication 11 UNIT(S): at 10:06

## 2024-09-20 RX ADMIN — CHLORHEXIDINE GLUCONATE ORAL RINSE 1 APPLICATION(S): 1.2 SOLUTION DENTAL at 07:28

## 2024-09-20 RX ADMIN — INSULIN GLARGINE 22 UNIT(S): 300 INJECTION, SOLUTION SUBCUTANEOUS at 22:05

## 2024-09-20 RX ADMIN — FUROSEMIDE 20 MILLIGRAM(S): 10 INJECTION INTRAVENOUS at 06:33

## 2024-09-20 RX ADMIN — Medication 500000 UNIT(S): at 06:33

## 2024-09-20 RX ADMIN — VALSARTAN 40 MILLIGRAM(S): 320 TABLET ORAL at 07:25

## 2024-09-20 RX ADMIN — Medication 1 DROP(S): at 18:11

## 2024-09-20 RX ADMIN — PREDNISONE 40 MILLIGRAM(S): 5 TABLET ORAL at 06:34

## 2024-09-20 RX ADMIN — Medication 12.5 MILLIGRAM(S): at 06:33

## 2024-09-20 RX ADMIN — Medication 7500 UNIT(S): at 22:06

## 2024-09-20 RX ADMIN — Medication 500000 UNIT(S): at 12:00

## 2024-09-20 RX ADMIN — Medication 1 DROP(S): at 11:59

## 2024-09-20 RX ADMIN — Medication 500000 UNIT(S): at 18:10

## 2024-09-20 NOTE — PROGRESS NOTE ADULT - ASSESSMENT
38-year-old male hypertension, DM2, HLD, hepatitis presents with acute hypoxic respiratory failure c/b undifferentiated shock w/ new HFrEF    Review of Studies:  TTE 9/16/24: LVIDd 6, LV mildly dilated and mild concentric LVH w/ LVEF 40-45% w/ RWMA, RV normal size and function, LA mildly dilated, RA borderline dilated, trace AR, insufficient TR for PASP, IVC estimated RAP 8   TTE 9/12/24: Left ventricular systolic function is severely decreased with an ejection fraction of 25 % by Valle's method of disks. Global left ventricular hypokinesis. The right ventricle is not well visualized, probably normal right ventricular systolic function. The cardiac valves are not well seen except for the aortic valve which appears normal. No pericardial effusion seen.  ECG: Sinus tachycardia with left axis deviation, NSST changes     #HFrEF  Etiology: Unclear etiology at this time. Stress CM vs. myocarditis vs. ischemic vs. etoh given hx. Would defer CCTA for ischemic eval until DIANDRA improves more   GDMT: Increase valsartan 40 mg q12 to valsartan 80 mg q12 and convert metoprolol tartrate 12.5 mg q12 with to metoprolol succinate 25 mg q24 strict holding parameters 100/60 BP. Per primary team, there will be financial barriers to entresto for this patient unfortunately due to lack of insurance.  Diuretics: Would give furosemide 20 mg IV x1 today. Start furosemide 20 mg po q24 9/21 AM  - Please obtain urine protein/creatinine. HIV negative, TSH (1.1), iron studies (no iron deficiency noted), lipid panel (), pro-bnp (1806 9/17). A1c noted of 11.6, positive coxsackie A + B titers noted  - Will f/u cMRI w/ renal protective gadolinium. Will plan for CCTA for ischemia eval on Monday 9/23. Please keep patient NPO Sunday Night    #DM  - Consider type 1 DM work up as well as endocrine consult to help establish care as well as discharge recs for uncontrolled DM  - Continue atorvastatin 40 mg q24    #HTN  Denies taking any home meds  - See GDMT above

## 2024-09-20 NOTE — PROGRESS NOTE ADULT - ATTENDING COMMENTS
38M with HTN, poorly-controlled DM2 A1c 11.6%, HLD who was initially admitted at OSH for AHRF requiring intubation x2, course complicated by mixed septic vs cardiogenic shock.    Was intubated and requiring inotropes, now weaned off with LV EF improving 25% --> 40-45%. Completed 7d course of abx. Coxsackie ttiters positive, but low clinical suspicion for myopericarditis, pending cMRI for further evaluation, possibly genetic testing. GDMT being titrated.    Patient overall very well-appearing today, still on 2LNC which can likely be weaned off. He was tearful when discussing that "it's been a hard year" and how overwhelmed he feels about recent events that have transpired, but is motivated to continue medical follow-up and medications.    #AHRF requiring intubation  #new HFrEF LV EF 20-25% --> 40-45%  #poorly-controlled DM2 #HTN #HLD  - continue GDMT as per Cards  - cMRI  - prednisone 20 today, taper by half daily  - Appreciate Renetta, Endo, Pulm  - anticipate d/c on insulin

## 2024-09-20 NOTE — PROGRESS NOTE ADULT - PROBLEM SELECTOR PLAN 2
While at [facility] pt was found  to have EF 25% on TTE with global left ventricular hypokinesis requiring  levophed and dobutamine  Ddx include ischemic cardiomyopathy vs eosinophilic myocardiosis (given high suspension for eosinophilic pneumonia after improvement with IV steroids) vs alcohol induced cardiomyopathy (given history of alcohol use).   POCUS on admission 9/14 showed moderate to severe LV dysfunction with global hypokinesis  Now off pressor support. A-line removed  Repeat TTE on 9/16, 40-45%, improved reported TTE at [place] 25%.     Plan  - increased valsartan to 40mg BID with hold parameters  - start lopressor 12.5mg BID  - start lasix 20mg qd  - c/w spironolactone 25mg qd  - c/w lipitor 40mg qhs  - NPO MN for MR cardiac tomorrow to assess for myocarditis and scar tissue  - Can consider CCTA in future for ischemic evaluation  - Goal MAP >65  - Cardiology following, recs appreciated While at [facility] pt was found  to have EF 25% on TTE with global left ventricular hypokinesis requiring  levophed and dobutamine  Ddx include ischemic cardiomyopathy vs eosinophilic myocardiosis (given high suspension for eosinophilic pneumonia after improvement with IV steroids) vs alcohol induced cardiomyopathy (given history of alcohol use).   POCUS on admission 9/14 showed moderate to severe LV dysfunction with global hypokinesis  Now off pressor support. A-line removed  Repeat TTE on 9/16, 40-45%, improved reported TTE at [place] 25%.     Plan  - increased valsartan to 40mg BID with hold parameters  - c/w lopressor 12.5mg BID  - c/w lasix 20mg qd  - c/w spironolactone 25mg qd  - c/w lipitor 40mg qhs  - NPO for MR cardiac today to assess for myocarditis and scar tissue  - Can consider CCTA in future for ischemic evaluation  - Goal MAP >65  - Cardiology following, recs appreciated

## 2024-09-20 NOTE — PROGRESS NOTE ADULT - ATTENDING COMMENTS
The patient had her cardiac MRI .Steroids are being tapered and ending in 2 days. He continues on 2 L nasal cannula O2. Hgb A1C is 11.6%. Glucose levels yesterday were 161-184 and today 132-157. O agree with continuing treatment with 22 units Lantus Insulin and 11 units pre-meal Lispro Insulin.

## 2024-09-20 NOTE — PROGRESS NOTE ADULT - PROBLEM SELECTOR PLAN 1
Possibly 2/2 hypersensitivity pneumonitis vs eosinophilic pneumonia -- etiology is unclear with severe PNA and ARDS. Newly reduced EF with cardiogenic shock at [place] suggesting flash pulmonary edema as possible cause   Initially presented to [facility] hospital on 9/10 for SOB, PERRY, cough x 2 weeks. Found to have increasing O2 requirements initially placed on NC > HFNC > BiPAP, eventually requiring intubation  CXR with multi-focal pneumonia.   Pt improved rapidly after treatment, which was highly concerning for hypersensitivity pneumonitis vs eosinophilic pneumonia  Patient was treated with CTX 9/10-9/14, Azithro 9/10-9/12, Bactrim 9/11-9/12, iv solumedrol 9/11-9/14 at OSH.   s/p Bronchoscopy 9/13; s/p zosyn 7d course (received half of it at [facility])  Repeat CT- chest with improved bilateral infiltrates  Fungitell, autoimmune workup negative   Coxsackie A and B titers positive    Plan:  - OOB, IS  - tapered to prednisone 40mg today, plan to taper by half every day per pulmonology recs  - wean oxygen as tolerated  - Needs pulmonology outpatient follow up in [place] Possibly 2/2 hypersensitivity pneumonitis vs eosinophilic pneumonia -- etiology is unclear with severe PNA and ARDS. Newly reduced EF with cardiogenic shock at [place] suggesting flash pulmonary edema as possible cause   Initially presented to [facility] hospital on 9/10 for SOB, PERRY, cough x 2 weeks. Found to have increasing O2 requirements initially placed on NC > HFNC > BiPAP, eventually requiring intubation  CXR with multi-focal pneumonia.   Pt improved rapidly after treatment, which was highly concerning for hypersensitivity pneumonitis vs eosinophilic pneumonia  Patient was treated with CTX 9/10-9/14, Azithro 9/10-9/12, Bactrim 9/11-9/12, iv solumedrol 9/11-9/14 at OSH.   s/p Bronchoscopy 9/13; s/p zosyn 7d course (received half of it at [place])  Repeat CT- chest with improved bilateral infiltrates  Fungitell, autoimmune workup negative   Coxsackie A and B titers positive    Plan:  - OOB, IS  - prednisone 20 mg today, taper down to 10mg tomorrow, 5 mg on 9/22  - wean oxygen as tolerated  - Needs pulmonology outpatient follow up in [place]  - c/w PT Possibly 2/2 hypersensitivity pneumonitis vs eosinophilic pneumonia -- etiology is unclear with severe PNA and ARDS. Newly reduced EF with cardiogenic shock at [place] suggesting flash pulmonary edema as possible cause   Initially presented to [facility] hospital on 9/10 for SOB, PERRY, cough x 2 weeks. Found to have increasing O2 requirements initially placed on NC > HFNC > BiPAP, eventually requiring intubation  CXR with multi-focal pneumonia.   Pt improved rapidly after treatment, which was highly concerning for hypersensitivity pneumonitis vs eosinophilic pneumonia  Patient was treated with CTX 9/10-9/14, Azithro 9/10-9/12, Bactrim 9/11-9/12, iv solumedrol 9/11-9/14 at OSH.   s/p Bronchoscopy 9/13; s/p zosyn 7d course (received half of it at [place])  Repeat CT- chest with improved bilateral infiltrates  Fungitell, autoimmune workup negative   Coxsackie A and B titers positive    Plan:  - OOB, IS  - prednisone 40 mg today, then 20 mg tomorrow, then taper down to 10mg on 9/22, then 5mg on 9/23  - wean oxygen as tolerated  - Needs pulmonology outpatient follow up in [place]  - c/w PT

## 2024-09-20 NOTE — PROGRESS NOTE ADULT - ASSESSMENT
38M with HTN, DM, hepatitis initially presented to [facility] with SOB and cough transferred from [facility] for AHRF and intubated to [facility] MICU. Extubated and weaned to [facility] and then NC. Pulmonology consulted for AHRF.    At [place], he was started on a course of antibiotics at [facility] with an initial differential of severe CAP vs Organizing Pneumonia vs PJP. Hospital course prior to transfer was complicated by newly reduced EF of 25% and cardiogenic shock requiring dobutamine and levophed. Bronchoscopy was performed on 9/13 with no BAL, cultures NGTD. HIV was negative.     9/10-9/14 treated with CTX  9/10-9/12 treated with azithromycin   9/11-9/12 treated with bactrim  9/11-9/14 treated with solumedrol  9/13 started on zosyn     Data Review:  CT chest 9/14: Extensive bilateral patchy and consolidative opacities decreased in the upper lobes and increased in the lower lobes compared to prior.   TTE:  EF 40% with mildly reduced LV systolic function, mild dilated Lv and mild symmetric LV hypertrophy  procal elevated to 4.4 --> 0.2 ( on repeat)  Fungitell, c-ANCA, p-ANCA, Atypical ANCA, RF, CCP, DS DNA, Anti-López Ab, Anti-Ribonuclear Protein, Anti Solange-1, Scleroderma Ab, Anti SS-A Ab, Anti SS-B Ab, ANCA Reflex MPO and PROT3 negative     # Acute Hypoxic RF  # Community acquired pneumonia  # Pulmonary edema w/ new HFrEF    With sudden decompensation, AHRF etiology is unclear with severe PNA vs. aspiration event leading to ARDS. Newly reduced EF with cardiogenic shock at [place] suggest that cardiogenic pulmonary also contributed as possible cause of his clinical picture. He was also started on steroids and had rapid improvement temporally so eosinophillic pneumonia was on the differential but difficult to say without BAL cell count prior to steroid administration vs rapid improvement with steroids also supports eosinophilic PNA as a differential dx, however unable to confirm with no recent BAL prior to steroids. He is doing well w/ diuresis and now on nasal cannula. We are titrating steroids fast to see if he has worsening symptoms.    Recommendations  --continue 7 day course of Zosyn  --on prednisone 40 mg today and then can do 20 mg tomorrow  --maintain euvolemia; diurese accordingly  --continue to wean O2 as tolerated  --please organize outpatient f/u with Pulmonary medicine (Dr. Mtz or Dr. Corey in 2 weeks)    Seen and discussed w/ Dr. Mtz

## 2024-09-20 NOTE — PROGRESS NOTE ADULT - PROBLEM SELECTOR PLAN 3
Per chart review, home lisinopril 40mg and HCTZ 25mg but has not taken for past 2 months, however unsure if patient has been taking medications at home   Now Off pressor support   Map: 96    Plan   - c/w valsartan 20mg BID with hold parameters  - lopressor 12.5mg BID  - Goal MAP > 65

## 2024-09-20 NOTE — CHART NOTE - NSCHARTNOTEFT_GEN_A_CORE
ADMITTING DIAGNOSIS:   Patient is a 39y old  Male who presents with a chief complaint of SOB (20 Sep 2024 09:54)    PAST MEDICAL & SURGICAL HISTORY:  Diabetes  HTN (hypertension)  HLD (hyperlipidemia)  Alcohol use    CURRENT DIET ORDER: Diet, Consistent Carbohydrate w/Evening Snack (09-15-24 @ 14:54) [Active]     PO INTAKE: Good (%) [X]  Fair (50-75%) [   ] Poor (<25%) [   ]    GI ISSUES: none reported at this time    PAIN: none reported at this time    SKIN: no pressure injury per RN flowsheets    EDEMA: 2+ L + R foot edema per RN flowsheets    LABS:   09-20    136  |  105  |  21  ----------------------------<  97  4.3   |  19[L]  |  0.92    Ca    8.4      20 Sep 2024 07:29  Phos  3.3     09-20  Mg     1.8     09-20    TPro  5.5[L]  /  Alb  2.6[L]  /  TBili  0.5  /  DBili  x   /  AST  19  /  ALT  18  /  AlkPhos  66  09-20    CAPILLARY BLOOD GLUCOSE  POCT Blood Glucose.: 157 mg/dL (20 Sep 2024 13:31)  POCT Blood Glucose.: 132 mg/dL (20 Sep 2024 09:27)  POCT Blood Glucose.: 184 mg/dL (19 Sep 2024 22:01)  POCT Blood Glucose.: 161 mg/dL (19 Sep 2024 18:05)    MEDICATIONS:  MEDICATIONS  (STANDING):  artificial  tears Solution 1 Drop(s) Both EYES four times a day  atorvastatin 40 milliGRAM(s) Oral at bedtime  chlorhexidine 2% Cloths 1 Application(s) Topical <User Schedule>  dextrose 5%. 1000 milliLiter(s) (100 mL/Hr) IV Continuous <Continuous>  dextrose 5%. 1000 milliLiter(s) (50 mL/Hr) IV Continuous <Continuous>  dextrose 50% Injectable 12.5 Gram(s) IV Push once  dextrose 50% Injectable 25 Gram(s) IV Push once  dextrose 50% Injectable 25 Gram(s) IV Push once  furosemide    Tablet 20 milliGRAM(s) Oral daily  glucagon  Injectable 1 milliGRAM(s) IntraMuscular once  heparin   Injectable 7500 Unit(s) SubCutaneous every 8 hours  insulin glargine Injectable (LANTUS) 22 Unit(s) SubCutaneous at bedtime  insulin lispro (ADMELOG) corrective regimen sliding scale   SubCutaneous at bedtime  insulin lispro (ADMELOG) corrective regimen sliding scale   SubCutaneous three times a day before meals  insulin lispro Injectable (ADMELOG) 11 Unit(s) SubCutaneous three times a day before meals  metoprolol tartrate 12.5 milliGRAM(s) Oral every 12 hours  nystatin    Suspension 410260 Unit(s) Swish and Swallow every 6 hours  pantoprazole    Tablet 40 milliGRAM(s) Oral before breakfast  sodium phosphate 15 milliMole(s)/250 mL IVPB 15 milliMole(s) IV Intermittent once  spironolactone 25 milliGRAM(s) Oral daily  valsartan 40 milliGRAM(s) Oral every 12 hours    MEDICATIONS  (PRN):  dextrose Oral Gel 15 Gram(s) Oral once PRN Blood Glucose LESS THAN 70 milliGRAM(s)/deciliter    WEIGHT:  Height for BMI (CENTIMETERS)	160 Centimeter(s)  Weight for BMI (lbs)	241.8 lb  Weight for BMI (kg)	109.7 kg  Body Mass Index	42.8    WEIGHT CHANGE: no weight change since admission    NUTRITION FOCUSED PHYSICAL EXAM: Not Pertinent at this time    ESTIMATED ENERGY NEEDS:   Weight used for calculations	IBW  Estimated Energy Needs Weight (lbs)	124.3 lb  Estimated Energy Needs Weight (kg)	56.4 kg  Estimated Energy Needs From (lorenzo/kg)	25  Estimated Energy Needs To (lorenzo/kg)	30  Estimated Energy Needs Calculated From (lorenzo/kg)	1410  Estimated Energy Needs Calculated To (lorenzo/kg)	1692    Weight used for calculations	IBW  Estimated Protein Needs Weight (lbs)	124.3 lb  Estimated Protein Needs Weight (kg)	56.4 kg  Estimated Protein Needs From (g/kg)	1  Estimated Protein Needs To (g/kg)	1.2  Estimated Protein Needs Calculated From (g/kg)	56.4  Estimated Protein Needs Calculated To (g/kg)	67.68    Estimated Fluid Needs Weight (lbs)	124.3 lb  Estimated Fluid Needs Weight (kg)	56.4 kg  Estimated Fluid Needs From (ml/kg)	25  Estimated Fluid Needs To (ml/kg)	30  Estimated Fluid Needs Calculated From (ml/kg)	1410  Estimated Fluid Needs Calculated To (ml/kg)	1692  *Needs determined using ideal body weight 56.4 kg (194%IBW) adjusted for maintenance.    SUBJECTIVE:   Per H&P: 38-year-old male hypertension, DM2, HLD, hepatitis presents with acute hypoxic respiratory failure 2/2 hypersensitivity pneumonitis vs eosinophilic pneumonia, cardiogenic shock, and [facility] from [facility] for continued management.     (9/16) Pt care discussed in interdisciplinary care team rounds. Rx and labs reviewed. Pt received resting in bed, alert and participatory in nutrition assessment. Pt reports a fair appetite today and reflective PO intake. Notes a usual body weight of 240 pounds; current body weight of 241 pounds. Pt states he has been losing wt recently on purpose but feels he has lost some wt unintentionally due to illness. Describes an intake of >75% of needs. No complaints of nausea/vomiting/constipation/diarrhea or difficult chew/swallow. Notes an allergy to hazelnuts and almonds; kitchen aware and honoring and ERM updated.     (9/20) Met with pt this AM at bedside. Pt was seated upright and able to articulate his nutrition hx well. Pt reported good appetite and taking adequate POs - RD observed 100% breakfast intake. Pt denied nausea/vomiting/diarrhea/constipation, stated last BM this AM 9/20. RD reviewed Carbohydrate Consistent diet including sources of carbohydrates, portion sizes, pairing protein with carbohydrates, limiting sugar sweetened beverages in diet and the importance of consistent eating pattern to help optimize glycemic control. Written materials on DM management provided for reference (see below). Pt was accepting to RD education and asking appropriate questions.    *DM Written Education Material (source: nutrition care manual):  - Carbohydrate Counting for People with Diabetes  - Plate Method for Diabetes  - Diabetes Label Reading Tips    PREVIOUS NUTRITION DIAGNOSIS: Inadequate Oral Intake related to pt condition as evidenced by recent extubation, limited appetite.    Active [   ]  Resolved [X]    IF RESOLVED, NEW PES: Altered nutrition related lab values related to endocrine dysfunction/DM as evidenced by POCT BG >150 mg/L; A1C 11.6% (9/15)    GOAL: Pt to show improvements in their POCT BG throughout hospitalization    MONITORING AND EVALUATION:   Current diet order is appropriate and is well tolerated, will continue to monitor:  - Food and nutrient intake/POs  - Nutrition related lab value, specifically POCT BG  - Weight/weight trends  - GI functions    NUTRITION RECOMMENDATIONS:   1. Continue with Consistent Carbohydrate (evening snack) diet  - Insulin per team  - Honor food preferences, as medically able  2. Appreciate updated weight and weekly weight trends  - On a standing scale, if able, or zero'd bed scale  3. Ongoing diet education    RISK LEVEL: High [   ] Moderate [X] Low [   ]    Gunjan Skinner RDN also available via TEAMS
Attestation of CVC Checklist review     Patient admitted with L IJ TLC.  At this time, L IJ TLC dressing c/d/i with no signs of infection. Per d/w primary team, decision made to keep this in place.    Cultures drawn - No     Type of CVC: Triple lumen catheter  Location of CVC: left IJ    This patient was admitted with the above CVC.  I reviewed the CVC checklist and determined that the CVC can be maintained and OK to use*.

## 2024-09-20 NOTE — PROGRESS NOTE ADULT - SUBJECTIVE AND OBJECTIVE BOX
Cardiology  Ermelindaaldair Holly | PGY-4    OVERNIGHT EVENTS: No overnight events      SUBJECTIVE: Denies chest pain, palpitations, dyspnea, worsening b/l LE edema, dizziness/syncope.       MEDICATIONS  (STANDING):  artificial  tears Solution 1 Drop(s) Both EYES four times a day  atorvastatin 40 milliGRAM(s) Oral at bedtime  chlorhexidine 2% Cloths 1 Application(s) Topical <User Schedule>  dextrose 5%. 1000 milliLiter(s) (100 mL/Hr) IV Continuous <Continuous>  dextrose 5%. 1000 milliLiter(s) (50 mL/Hr) IV Continuous <Continuous>  dextrose 50% Injectable 12.5 Gram(s) IV Push once  dextrose 50% Injectable 25 Gram(s) IV Push once  dextrose 50% Injectable 25 Gram(s) IV Push once  furosemide    Tablet 20 milliGRAM(s) Oral daily  glucagon  Injectable 1 milliGRAM(s) IntraMuscular once  heparin   Injectable 7500 Unit(s) SubCutaneous every 8 hours  insulin glargine Injectable (LANTUS) 22 Unit(s) SubCutaneous at bedtime  insulin lispro (ADMELOG) corrective regimen sliding scale   SubCutaneous at bedtime  insulin lispro (ADMELOG) corrective regimen sliding scale   SubCutaneous three times a day before meals  insulin lispro Injectable (ADMELOG) 11 Unit(s) SubCutaneous three times a day before meals  metoprolol tartrate 12.5 milliGRAM(s) Oral every 12 hours  nystatin    Suspension 885900 Unit(s) Swish and Swallow every 6 hours  pantoprazole    Tablet 40 milliGRAM(s) Oral before breakfast  sodium phosphate 15 milliMole(s)/250 mL IVPB 15 milliMole(s) IV Intermittent once  spironolactone 25 milliGRAM(s) Oral daily  valsartan 40 milliGRAM(s) Oral every 12 hours    MEDICATIONS  (PRN):  dextrose Oral Gel 15 Gram(s) Oral once PRN Blood Glucose LESS THAN 70 milliGRAM(s)/deciliter        T(F): 97.6 (09-20-24 @ 08:48), Max: 98.5 (09-20-24 @ 06:06)  HR: 78 (09-20-24 @ 08:48) (71 - 85)  BP: 129/86 (09-20-24 @ 08:48) (126/90 - 139/89)  BP(mean): 104 (09-19-24 @ 13:54) (104 - 104)  RR: 18 (09-20-24 @ 08:48) (18 - 18)  SpO2: 94% (09-20-24 @ 08:48) (94% - 96%)    PHYSICAL EXAM:     GENERAL: NAD, lying in bed comfortably  HEAD:  Atraumatic, Normocephalic  EYES: EOMI, PERRLA, conjunctiva and sclera clear, no nystagmus noted  ENT: Moist mucous membranes,   NECK: Supple, No JVD, trachea midline  CHEST/LUNG: Clear to auscultation bilaterally; No rales, rhonchi, wheezing, or rubs. Unlabored respirations  HEART: Regular rate and rhythm; No murmurs, rubs, or gallops, normal S1/S2  ABDOMEN: normal bowel sounds; Soft, nontender, nondistended, no organomegaly   EXTREMITIES:  2+ Peripheral Pulses, brisk capillary refill. 1+ pitting edema  MSK: No gross deformities noted   Neurological:  A&Ox3, no focal deficits   SKIN: No rashes or lesions  PSYCH: Normal mood, affect     TELEMETRY:    LABS:                        14.4   12.31 )-----------( 294      ( 20 Sep 2024 05:30 )             43.7     09-20    136  |  105  |  21  ----------------------------<  97  4.3   |  19[L]  |  0.92    Ca    8.4      20 Sep 2024 07:29  Phos  3.3     09-20  Mg     1.8     09-20    TPro  5.5[L]  /  Alb  2.6[L]  /  TBili  0.5  /  DBili  x   /  AST  19  /  ALT  18  /  AlkPhos  66  09-20            Creatinine Trend: 0.92<--, 1.14<--, 1.17<--, 1.36<--, 1.41<--, 2.70<--  I&O's Summary    BNP    RADIOLOGY & ADDITIONAL STUDIES:

## 2024-09-20 NOTE — PROGRESS NOTE ADULT - ATTENDING COMMENTS
37 yo man PMHx of HTN, DM2, and HLD who was admitted for new HFrEF    Assessment  1. New HFrEF 25-> 40%  Dilated cardiomyopathy likely NICM  Mildly decompensated on exam    Plan  1. GDMT: valsartan 80mg BID and metoprolol succinate 25mg PO QD; will later initiate MRA; will avoid ARNI + SGLT2i given lack of insurance  2. Cardiac MRI  3. Obtain CT coronary IV contrast for ischemic eval on Monday when tolerating BB  4. DM2 and close to 40, statin is ideal option    During non face-to-face time, I reviewed relevant portions of the patient’s medical record. During face-to-face time, I took a relevant history and examined the patient. I also explained differential diagnoses, relevant cardiac diagnoses, workup, and management plan, which required a high level of medical decision making. I answered all questions related to the patient's medical conditions.     Mikey Bullard M.D.  Attending Cardiologist  [facility]

## 2024-09-20 NOTE — PROGRESS NOTE ADULT - SUBJECTIVE AND OBJECTIVE BOX
SUBJECTIVE / INTERVAL HPI: Patient was seen and examined this morning.     Overnight events:    CAPILLARY BLOOD GLUCOSE & INSULIN RECEIVED  98 mg/dL (09-19 @ 06:13)  142 mg/dL (09-19 @ 11:16)  161 mg/dL (09-19 @ 18:05)  184 mg/dL (09-19 @ 22:01)  132 mg/dL (09-20 @ 09:27)      REVIEW OF SYSTEMS  Constitutional:  Negative fever, chills or loss of appetite.  Eyes:  Negative blurry vision or double vision.  Cardiovascular:  Negative for chest pain or palpitations.  Respiratory:  Negative for cough, wheezing, or shortness of breath.    Gastrointestinal:  Negative for nausea, vomiting, diarrhea, constipation, or abdominal pain.  Genitourinary:  Negative frequency, urgency or dysuria.  Neurologic:  No headache, confusion, dizziness, lightheadedness.    PHYSICAL EXAM  Vital Signs Last 24 Hrs  T(C): 36.4 (20 Sep 2024 08:48), Max: 36.9 (20 Sep 2024 06:06)  T(F): 97.6 (20 Sep 2024 08:48), Max: 98.5 (20 Sep 2024 06:06)  HR: 78 (20 Sep 2024 08:48) (71 - 85)  BP: 129/86 (20 Sep 2024 08:48) (126/90 - 139/89)  BP(mean): 104 (19 Sep 2024 13:54) (93 - 104)  RR: 18 (20 Sep 2024 08:48) (16 - 18)  SpO2: 94% (20 Sep 2024 08:48) (92% - 96%)    Parameters below as of 20 Sep 2024 10:24  Patient On (Oxygen Delivery Method): nasal cannula        Constitutional: Awake, alert, in no acute distress.   HEENT: Normocephalic, atraumatic, BONNIE.  Respiratory: Lungs clear to ausculation bilaterally.   Cardiovascular: regular rhythm, normal S1 and S2, no audible murmurs.   GI: soft, non-tender, non-distended, bowel sounds present.  Extremities: No lower extremity edema.  Psychiatric: AAO x 3. Normal affect/mood.     LABS  CBC - WBC/HGB/HTC/PLT: 12.31/14.4/43.7/294 (09-20-24)  BMP - Na/K/Cl/Bicarb/BUN/Cr/Gluc/AG/eGFR: 136/4.3/105/19/21/0.92/97/12/109 (09-20-24)  Ca - 8.4 (09-20-24)  Phos - 3.3 (09-20-24)  Mg - 1.8 (09-20-24)  LFT - Alb/Tprot/Tbili/Dbili/AlkPhos/ALT/AST: 2.6/--/0.5/--/66/18/19 (09-20-24)  PT/aPTT/INR: 12.2/24.7/1.07 (09-14-24)   Thyroid Stimulating Hormone, Serum: 1.100 (09-17-24)          MEDICATIONS  MEDICATIONS  (STANDING):  artificial  tears Solution 1 Drop(s) Both EYES four times a day  atorvastatin 40 milliGRAM(s) Oral at bedtime  chlorhexidine 2% Cloths 1 Application(s) Topical <User Schedule>  dextrose 5%. 1000 milliLiter(s) (100 mL/Hr) IV Continuous <Continuous>  dextrose 5%. 1000 milliLiter(s) (50 mL/Hr) IV Continuous <Continuous>  dextrose 50% Injectable 12.5 Gram(s) IV Push once  dextrose 50% Injectable 25 Gram(s) IV Push once  dextrose 50% Injectable 25 Gram(s) IV Push once  furosemide    Tablet 20 milliGRAM(s) Oral daily  glucagon  Injectable 1 milliGRAM(s) IntraMuscular once  heparin   Injectable 7500 Unit(s) SubCutaneous every 8 hours  insulin glargine Injectable (LANTUS) 22 Unit(s) SubCutaneous at bedtime  insulin lispro (ADMELOG) corrective regimen sliding scale   SubCutaneous at bedtime  insulin lispro (ADMELOG) corrective regimen sliding scale   SubCutaneous three times a day before meals  insulin lispro Injectable (ADMELOG) 11 Unit(s) SubCutaneous three times a day before meals  metoprolol tartrate 12.5 milliGRAM(s) Oral every 12 hours  nystatin    Suspension 897338 Unit(s) Swish and Swallow every 6 hours  pantoprazole    Tablet 40 milliGRAM(s) Oral before breakfast  predniSONE   Tablet 40 milliGRAM(s) Oral daily  sodium phosphate 15 milliMole(s)/250 mL IVPB 15 milliMole(s) IV Intermittent once  spironolactone 25 milliGRAM(s) Oral daily  valsartan 40 milliGRAM(s) Oral every 12 hours    MEDICATIONS  (PRN):  dextrose Oral Gel 15 Gram(s) Oral once PRN Blood Glucose LESS THAN 70 milliGRAM(s)/deciliter    ASSESSMENT / RECOMMENDATIONS  Pt is a 38-year-old male hypertension, DM2, HLD, hepatitis admitted for treatment of acute hypoxic respiratory failure 2/2 hypersensitivity pneumonitis vs eosinophilic pneumonia, course complicated by cardiogenic shock 2/2 new HF, pending cardiac MRI  Endocrinology consulted for DM management    A1C: 11.6 %  BUN: 21  Creatinine: 0.92  GFR: 109  Weight: 109.7  BMI: 42.9    # Type 2 diabetes mellitus with hyperglycemia  - Please keep lantus  units at bedtime.   - keep lispro   units before each meal.  - Continue lispro moderate dose sliding scale before meals and at bedtime.  - Patient's fingerstick glucose goal is 100-180 mg/dL.    - Discharge recommendations to be discussed.   - Patient can follow up at discharge with [facility] by calling [phone] to make an appointment.      Case discussed with Dr. Brantley. Primary team updated.    *************INCOMPLETE NOTE************     SUBJECTIVE / INTERVAL HPI: Patient was seen and examined this morning. Pt reports feeling well this AM. Is amenable to insulin regimen if neccessary. He also reports that his breathing is a lot better and has been getting around on his own. Ate 3/4 dinner last night. For breakfast, he are a bowl of cereal, hard boiled egg and tea. For lunch ate all the beef rigatoni.    Overnight events: none    CAPILLARY BLOOD GLUCOSE & INSULIN RECEIVED  98 mg/dL (09-19 @ 06:13)  142 mg/dL (09-19 @ 11:16)  161 mg/dL (09-19 @ 18:05)  184 mg/dL (09-19 @ 22:01)  132 mg/dL (09-20 @ 09:27)      REVIEW OF SYSTEMS  Constitutional:  Negative fever, chills or loss of appetite.  Eyes:  Negative blurry vision or double vision.  Cardiovascular:  Negative for chest pain or palpitations.  Respiratory:  Negative for cough, wheezing, or shortness of breath.    Gastrointestinal:  Negative for nausea, vomiting, diarrhea, constipation, or abdominal pain.  Genitourinary:  Negative frequency, urgency or dysuria.  Neurologic:  No headache, confusion, dizziness, lightheadedness.    PHYSICAL EXAM  Vital Signs Last 24 Hrs  T(C): 36.4 (20 Sep 2024 08:48), Max: 36.9 (20 Sep 2024 06:06)  T(F): 97.6 (20 Sep 2024 08:48), Max: 98.5 (20 Sep 2024 06:06)  HR: 78 (20 Sep 2024 08:48) (71 - 85)  BP: 129/86 (20 Sep 2024 08:48) (126/90 - 139/89)  BP(mean): 104 (19 Sep 2024 13:54) (93 - 104)  RR: 18 (20 Sep 2024 08:48) (16 - 18)  SpO2: 94% (20 Sep 2024 08:48) (92% - 96%)    Parameters below as of 20 Sep 2024 10:24  Patient On (Oxygen Delivery Method): nasal cannula        Constitutional: Awake, alert, in no acute distress.   HEENT: Normocephalic, atraumatic, BONNIE.  Respiratory: Lungs clear to ausculation bilaterally.   Cardiovascular: regular rhythm, normal S1 and S2, no audible murmurs.   GI: soft, non-tender, non-distended, bowel sounds present.  Extremities: No lower extremity edema.  Psychiatric: AAO x 3. Normal affect/mood.     LABS  CBC - WBC/HGB/HTC/PLT: 12.31/14.4/43.7/294 (09-20-24)  BMP - Na/K/Cl/Bicarb/BUN/Cr/Gluc/AG/eGFR: 136/4.3/105/19/21/0.92/97/12/109 (09-20-24)  Ca - 8.4 (09-20-24)  Phos - 3.3 (09-20-24)  Mg - 1.8 (09-20-24)  LFT - Alb/Tprot/Tbili/Dbili/AlkPhos/ALT/AST: 2.6/--/0.5/--/66/18/19 (09-20-24)  PT/aPTT/INR: 12.2/24.7/1.07 (09-14-24)   Thyroid Stimulating Hormone, Serum: 1.100 (09-17-24)          MEDICATIONS  MEDICATIONS  (STANDING):  artificial  tears Solution 1 Drop(s) Both EYES four times a day  atorvastatin 40 milliGRAM(s) Oral at bedtime  chlorhexidine 2% Cloths 1 Application(s) Topical <User Schedule>  dextrose 5%. 1000 milliLiter(s) (100 mL/Hr) IV Continuous <Continuous>  dextrose 5%. 1000 milliLiter(s) (50 mL/Hr) IV Continuous <Continuous>  dextrose 50% Injectable 12.5 Gram(s) IV Push once  dextrose 50% Injectable 25 Gram(s) IV Push once  dextrose 50% Injectable 25 Gram(s) IV Push once  furosemide    Tablet 20 milliGRAM(s) Oral daily  glucagon  Injectable 1 milliGRAM(s) IntraMuscular once  heparin   Injectable 7500 Unit(s) SubCutaneous every 8 hours  insulin glargine Injectable (LANTUS) 22 Unit(s) SubCutaneous at bedtime  insulin lispro (ADMELOG) corrective regimen sliding scale   SubCutaneous at bedtime  insulin lispro (ADMELOG) corrective regimen sliding scale   SubCutaneous three times a day before meals  insulin lispro Injectable (ADMELOG) 11 Unit(s) SubCutaneous three times a day before meals  metoprolol tartrate 12.5 milliGRAM(s) Oral every 12 hours  nystatin    Suspension 787534 Unit(s) Swish and Swallow every 6 hours  pantoprazole    Tablet 40 milliGRAM(s) Oral before breakfast  predniSONE   Tablet 40 milliGRAM(s) Oral daily  sodium phosphate 15 milliMole(s)/250 mL IVPB 15 milliMole(s) IV Intermittent once  spironolactone 25 milliGRAM(s) Oral daily  valsartan 40 milliGRAM(s) Oral every 12 hours    MEDICATIONS  (PRN):  dextrose Oral Gel 15 Gram(s) Oral once PRN Blood Glucose LESS THAN 70 milliGRAM(s)/deciliter    ASSESSMENT / RECOMMENDATIONS  Pt is a 38-year-old male hypertension, DM2, HLD, hepatitis admitted for treatment of acute hypoxic respiratory failure 2/2 hypersensitivity pneumonitis vs eosinophilic pneumonia, course complicated by cardiogenic shock 2/2 new HF, pending cardiac MRI  Endocrinology consulted for DM management    A1C: 11.6 %  BUN: 21  Creatinine: 0.92  GFR: 109  Weight: 109.7  BMI: 42.9    # Type 2 diabetes mellitus with hyperglycemia  - Please keep lantus  units at bedtime.   - keep lispro   units before each meal.  - Continue lispro moderate dose sliding scale before meals and at bedtime.  - Patient's fingerstick glucose goal is 100-180 mg/dL.    - Discharge recommendations to be discussed.   - Patient can follow up at discharge with [facility] by calling [phone] to make an appointment.      Case discussed with Dr. Brantley. Primary team updated.    *************INCOMPLETE NOTE************     SUBJECTIVE / INTERVAL HPI: Patient was seen and examined this morning. Pt reports feeling well this AM. Is amenable to insulin regimen if neccessary. He also reports that his breathing is a lot better and has been getting around on his own. Ate 3/4 dinner last night. For breakfast, he are a bowl of cereal, hard boiled egg and tea. For lunch ate all the beef rigatoni.    Overnight events: none    CAPILLARY BLOOD GLUCOSE & INSULIN RECEIVED  98 mg/dL (09-19 @ 06:13)  142 mg/dL (09-19 @ 11:16)  161 mg/dL (09-19 @ 18:05)  184 mg/dL (09-19 @ 22:01)  132 mg/dL (09-20 @ 09:27)      REVIEW OF SYSTEMS  Constitutional:  Negative fever, chills or loss of appetite.  Eyes:  Negative blurry vision or double vision.  Cardiovascular:  Negative for chest pain or palpitations.  Respiratory:  Negative for cough, wheezing, or shortness of breath.    Gastrointestinal:  Negative for nausea, vomiting, diarrhea, constipation, or abdominal pain.  Genitourinary:  Negative frequency, urgency or dysuria.  Neurologic:  No headache, confusion, dizziness, lightheadedness.    PHYSICAL EXAM  Vital Signs Last 24 Hrs  T(C): 36.4 (20 Sep 2024 08:48), Max: 36.9 (20 Sep 2024 06:06)  T(F): 97.6 (20 Sep 2024 08:48), Max: 98.5 (20 Sep 2024 06:06)  HR: 78 (20 Sep 2024 08:48) (71 - 85)  BP: 129/86 (20 Sep 2024 08:48) (126/90 - 139/89)  BP(mean): 104 (19 Sep 2024 13:54) (93 - 104)  RR: 18 (20 Sep 2024 08:48) (16 - 18)  SpO2: 94% (20 Sep 2024 08:48) (92% - 96%)    Parameters below as of 20 Sep 2024 10:24  Patient On (Oxygen Delivery Method): nasal cannula        Constitutional: Awake, alert, in no acute distress.   HEENT: Normocephalic, atraumatic, BONNIE.  Respiratory: Lungs clear to ausculation bilaterally.   Cardiovascular: regular rhythm, normal S1 and S2, no audible murmurs.   GI: soft, non-tender, non-distended, bowel sounds present.  Extremities: No lower extremity edema.  Psychiatric: AAO x 3. Normal affect/mood.     LABS  CBC - WBC/HGB/HTC/PLT: 12.31/14.4/43.7/294 (09-20-24)  BMP - Na/K/Cl/Bicarb/BUN/Cr/Gluc/AG/eGFR: 136/4.3/105/19/21/0.92/97/12/109 (09-20-24)  Ca - 8.4 (09-20-24)  Phos - 3.3 (09-20-24)  Mg - 1.8 (09-20-24)  LFT - Alb/Tprot/Tbili/Dbili/AlkPhos/ALT/AST: 2.6/--/0.5/--/66/18/19 (09-20-24)  PT/aPTT/INR: 12.2/24.7/1.07 (09-14-24)   Thyroid Stimulating Hormone, Serum: 1.100 (09-17-24)          MEDICATIONS  MEDICATIONS  (STANDING):  artificial  tears Solution 1 Drop(s) Both EYES four times a day  atorvastatin 40 milliGRAM(s) Oral at bedtime  chlorhexidine 2% Cloths 1 Application(s) Topical <User Schedule>  dextrose 5%. 1000 milliLiter(s) (100 mL/Hr) IV Continuous <Continuous>  dextrose 5%. 1000 milliLiter(s) (50 mL/Hr) IV Continuous <Continuous>  dextrose 50% Injectable 12.5 Gram(s) IV Push once  dextrose 50% Injectable 25 Gram(s) IV Push once  dextrose 50% Injectable 25 Gram(s) IV Push once  furosemide    Tablet 20 milliGRAM(s) Oral daily  glucagon  Injectable 1 milliGRAM(s) IntraMuscular once  heparin   Injectable 7500 Unit(s) SubCutaneous every 8 hours  insulin glargine Injectable (LANTUS) 22 Unit(s) SubCutaneous at bedtime  insulin lispro (ADMELOG) corrective regimen sliding scale   SubCutaneous at bedtime  insulin lispro (ADMELOG) corrective regimen sliding scale   SubCutaneous three times a day before meals  insulin lispro Injectable (ADMELOG) 11 Unit(s) SubCutaneous three times a day before meals  metoprolol tartrate 12.5 milliGRAM(s) Oral every 12 hours  nystatin    Suspension [id number] Unit(s) Swish and Swallow every 6 hours  pantoprazole    Tablet 40 milliGRAM(s) Oral before breakfast  predniSONE   Tablet 40 milliGRAM(s) Oral daily  sodium phosphate 15 milliMole(s)/250 mL IVPB 15 milliMole(s) IV Intermittent once  spironolactone 25 milliGRAM(s) Oral daily  valsartan 40 milliGRAM(s) Oral every 12 hours    MEDICATIONS  (PRN):  dextrose Oral Gel 15 Gram(s) Oral once PRN Blood Glucose LESS THAN 70 milliGRAM(s)/deciliter    ASSESSMENT / RECOMMENDATIONS  Pt is a 38-year-old male hypertension, DM2, HLD, hepatitis admitted for treatment of acute hypoxic respiratory failure 2/2 hypersensitivity pneumonitis vs eosinophilic pneumonia, course complicated by cardiogenic shock 2/2 new HF, pending cardiac MRI  Endocrinology consulted for DM management    A1C: 11.6 %  BUN: 21  Creatinine: 0.92  GFR: 109  Weight: 109.7  BMI: 42.9    # Type 2 diabetes mellitus with hyperglycemia  - Please keep lantus 22 units at bedtime.   - keep lispro 11  units before each meal.  - Continue lispro moderate dose sliding scale before meals and at bedtime.  - Patient's fingerstick glucose goal is 100-180 mg/dL.    - Discharge recommendations to be discussed.   - Patient can follow up at discharge with [facility] by calling [phone] to make an appointment.      Case discussed with Dr. Brantley. Primary team updated.    *************INCOMPLETE NOTE************

## 2024-09-20 NOTE — PROGRESS NOTE ADULT - SUBJECTIVE AND OBJECTIVE BOX
PULMONARY CONSULT SERVICE FOLLOW-UP NOTE    INTERVAL HPI:  Reviewed chart and overnight events; patient seen and examined. He feels great this AM, ambulated w/ PT yesterday and doing incentive melba and sitting in chair. Does not feel worse w/ lower doses of steroids.     MEDICATIONS:  Pulmonary:    Antimicrobials:  nystatin    Suspension 429160 Unit(s) Swish and Swallow every 6 hours    Anticoagulants:  heparin   Injectable 7500 Unit(s) SubCutaneous every 8 hours    Cardiac:  furosemide    Tablet 20 milliGRAM(s) Oral daily  metoprolol tartrate 12.5 milliGRAM(s) Oral every 12 hours  spironolactone 25 milliGRAM(s) Oral daily  valsartan 40 milliGRAM(s) Oral every 12 hours      Allergies    [place] (Stomach Upset)  [place] (Stomach Upset)  No Known Drug Allergies    Intolerances        Vital Signs Last 24 Hrs  T(C): 36.4 (20 Sep 2024 08:48), Max: 36.9 (20 Sep 2024 06:06)  T(F): 97.6 (20 Sep 2024 08:48), Max: 98.5 (20 Sep 2024 06:06)  HR: 78 (20 Sep 2024 08:48) (71 - 85)  BP: 129/86 (20 Sep 2024 08:48) (126/90 - 139/89)  BP(mean): 104 (19 Sep 2024 13:54) (93 - 104)  RR: 18 (20 Sep 2024 08:48) (16 - 18)  SpO2: 94% (20 Sep 2024 08:48) (92% - 96%)    Parameters below as of 20 Sep 2024 08:48  Patient On (Oxygen Delivery Method): nasal cannula            PHYSICAL EXAM:  Constitutional: well-appearing, in no apparent respiratory distress  HEENT: NC/AT; PERRL, anicteric sclera; moist mucous membranes  Neck: supple, no JVD or LAD appreciated  Cardiovascular: +S1/S2, Regular rate and rhythm, no murmurs appreciated   Respiratory: Clear breath sounds bilaterally. No wheezes, rhonchi or rales   Gastrointestinal: soft, non-tender, non-distended. Normoactive bowel sounds.   Extremities: no edema, clubbing or cyanosis  Vascular: 2+ radial pulses B/L  Neurological: awake and alert; oriented x3    LABS:      CBC Full  -  ( 20 Sep 2024 05:30 )  WBC Count : 12.31 K/uL  RBC Count : 5.24 M/uL  Hemoglobin : 14.4 g/dL  Hematocrit : 43.7 %  Platelet Count - Automated : 294 K/uL  Mean Cell Volume : 83.4 fl  Mean Cell Hemoglobin : 27.5 pg  Mean Cell Hemoglobin Concentration : 33.0 gm/dL  Auto Neutrophil # : 8.29 K/uL  Auto Lymphocyte # : 2.54 K/uL  Auto Monocyte # : 0.93 K/uL  Auto Eosinophil # : 0.16 K/uL  Auto Basophil # : 0.05 K/uL  Auto Neutrophil % : 67.3 %  Auto Lymphocyte % : 20.6 %  Auto Monocyte % : 7.6 %  Auto Eosinophil % : 1.3 %  Auto Basophil % : 0.4 %    09-20    136  |  105  |  21  ----------------------------<  97  4.3   |  19[L]  |  0.92    Ca    8.4      20 Sep 2024 07:29  Phos  3.3     09-20  Mg     1.8     09-20    TPro  5.5[L]  /  Alb  2.6[L]  /  TBili  0.5  /  DBili  x   /  AST  19  /  ALT  18  /  AlkPhos  66  09-20          RADIOLOGY & ADDITIONAL STUDIES:    < from: Xray Chest 1 View- PORTABLE-Urgent (Xray Chest 1 View- PORTABLE-Urgent .) (09.19.24 @ 15:39) >  FINDINGS:  Improved aeration compared to prior exam. Improved conspicuity of the   cardiomediastinal silhouette. Significant improvement in bibasilar   predominant opacities with remaining left greater than right bibasilar   opacities.No right pleural effusion. Possible small left pleural   effusion. No pneumothorax. No acute abnormalities of the soft tissues and   osseous structures.    IMPRESSION:  Significant improvement in bibasilar predominant opacities and left   effusion. Improving multifocal pneumonia versus pulmonary edema.    --- End of Report ---    < end of copied text >      < from: CT Chest No Cont (09.14.24 @ 22:44) >    FINDINGS:    LUNGS AND LARGE AIRWAYS: Endotracheal tube terminates above the renetta.   Extensive bilateral patchy and consolidative opacities decreased in the   upper lobes and increased in the lower lobes compared to the prior study.  PLEURA: No pleural effusion.  VESSELS: Left IJ central line terminates at the superior cavoatrial   junction.  HEART: Heart size is normal. No pericardial effusion.  MEDIASTINUM AND ASMITA: No lymphadenopathy. Enteric tube courses through   the esophagus into the stomach.  CHEST WALL AND LOWER NECK: Within normal limits.  VISUALIZED UPPER ABDOMEN: Within normal limits.  BONES: Degenerative changes.    IMPRESSION:  Extensive bilateral patchy and consolidative opacities decreased in the   upper lobes and increased in the lower lobes compared to the prior study.    < end of copied text >

## 2024-09-20 NOTE — PROGRESS NOTE ADULT - SUBJECTIVE AND OBJECTIVE BOX
OVERNIGHT EVENTS:    SUBJECTIVE / INTERVAL HPI: Patient seen and examined at bedside.       PHYSICAL EXAM:    General: Lying comfortably and in no acute distress  HEENT: NC/AT; EOMI, anicteric sclera; MMM  Neck: supple  Cardiovascular: +S1/S2, RRR  Respiratory: CTA B/L; no W/R/R  Gastrointestinal: soft, NT/ND; +BSx4  Extremities: WWP; no edema, clubbing or cyanosis  Vascular: 2+ radial pulses B/L  Neurological: AAOx3; no focal deficits  Psychiatric: pleasant mood and affect  Dermatologic: no appreciable wounds or damage to the skin    VITAL SIGNS:  Vital Signs Last 24 Hrs  T(C): 36.9 (20 Sep 2024 06:06), Max: 36.9 (20 Sep 2024 06:06)  T(F): 98.5 (20 Sep 2024 06:06), Max: 98.5 (20 Sep 2024 06:06)  HR: 81 (20 Sep 2024 06:06) (78 - 85)  BP: 132/89 (20 Sep 2024 06:06) (126/90 - 139/83)  BP(mean): 104 (19 Sep 2024 13:54) (93 - 106)  RR: 18 (20 Sep 2024 06:06) (16 - 18)  SpO2: 94% (20 Sep 2024 06:06) (92% - 96%)    Parameters below as of 19 Sep 2024 21:26  Patient On (Oxygen Delivery Method): nasal cannula          MEDICATIONS:  MEDICATIONS  (STANDING):  artificial  tears Solution 1 Drop(s) Both EYES four times a day  atorvastatin 40 milliGRAM(s) Oral at bedtime  chlorhexidine 2% Cloths 1 Application(s) Topical <User Schedule>  dextrose 5%. 1000 milliLiter(s) (100 mL/Hr) IV Continuous <Continuous>  dextrose 5%. 1000 milliLiter(s) (50 mL/Hr) IV Continuous <Continuous>  dextrose 50% Injectable 12.5 Gram(s) IV Push once  dextrose 50% Injectable 25 Gram(s) IV Push once  dextrose 50% Injectable 25 Gram(s) IV Push once  furosemide    Tablet 20 milliGRAM(s) Oral daily  glucagon  Injectable 1 milliGRAM(s) IntraMuscular once  heparin   Injectable 7500 Unit(s) SubCutaneous every 8 hours  insulin glargine Injectable (LANTUS) 22 Unit(s) SubCutaneous at bedtime  insulin lispro (ADMELOG) corrective regimen sliding scale   SubCutaneous three times a day before meals  insulin lispro (ADMELOG) corrective regimen sliding scale   SubCutaneous at bedtime  insulin lispro Injectable (ADMELOG) 11 Unit(s) SubCutaneous three times a day before meals  metoprolol tartrate 12.5 milliGRAM(s) Oral every 12 hours  nystatin    Suspension 494321 Unit(s) Swish and Swallow every 6 hours  pantoprazole    Tablet 40 milliGRAM(s) Oral before breakfast  predniSONE   Tablet 40 milliGRAM(s) Oral daily  sodium phosphate 15 milliMole(s)/250 mL IVPB 15 milliMole(s) IV Intermittent once  spironolactone 25 milliGRAM(s) Oral daily  valsartan 40 milliGRAM(s) Oral every 12 hours    MEDICATIONS  (PRN):  dextrose Oral Gel 15 Gram(s) Oral once PRN Blood Glucose LESS THAN 70 milliGRAM(s)/deciliter      ALLERGIES:  Allergies    [place] (Stomach Upset)  [place] (Stomach Upset)  No Known Drug Allergies    Intolerances        LABS:                        13.8   11.50 )-----------( 273      ( 19 Sep 2024 05:50 )             40.2     09-19    138  |  104  |  28[H]  ----------------------------<  116[H]  3.3[L]   |  25  |  1.14    Ca    8.1[L]      19 Sep 2024 05:50  Phos  3.1     09-19  Mg     1.7     09-19        Urinalysis Basic - ( 19 Sep 2024 05:50 )    Color: x / Appearance: x / SG: x / pH: x  Gluc: 116 mg/dL / Ketone: x  / Bili: x / Urobili: x   Blood: x / Protein: x / Nitrite: x   Leuk Esterase: x / RBC: x / WBC x   Sq Epi: x / Non Sq Epi: x / Bacteria: x      CAPILLARY BLOOD GLUCOSE      POCT Blood Glucose.: 184 mg/dL (19 Sep 2024 22:01)      RADIOLOGY & ADDITIONAL TESTS: Reviewed. OVERNIGHT EVENTS: STEFFI    SUBJECTIVE / INTERVAL HPI: Patient seen and examined at bedside. Pt reports his breathing has been gradually improving since yesterday, reports he has been OOB to chair and walking with assistance with no PERRY. He denies any CP, F/C, abdominal pain, constipation, or diarrhea but does admit to continued mild LE swelling. Pt was informed of the plan for cardiac MR today and to be NPO 1 hour prior. He reports no concerns at this time and is amendable to the plan.    PHYSICAL EXAM:    General: Lying comfortably and in no acute distress  HEENT: NC/AT; EOMI, anicteric sclera; MMM  Neck: supple  Cardiovascular: +S1/S2, RRR  Respiratory: CTA B/L; no W/R/R  Gastrointestinal: soft, NT/ND; +BSx4  Extremities: WWP; no clubbing or cyanosis. 1+ bilateral pitting edema  Vascular: 2+ radial pulses B/L  Neurological: AAOx3; no focal deficits  Psychiatric: pleasant mood and affect  Dermatologic: no appreciable wounds or damage to the skin    VITAL SIGNS:  Vital Signs Last 24 Hrs  T(C): 36.9 (20 Sep 2024 06:06), Max: 36.9 (20 Sep 2024 06:06)  T(F): 98.5 (20 Sep 2024 06:06), Max: 98.5 (20 Sep 2024 06:06)  HR: 81 (20 Sep 2024 06:06) (78 - 85)  BP: 132/89 (20 Sep 2024 06:06) (126/90 - 139/83)  BP(mean): 104 (19 Sep 2024 13:54) (93 - 106)  RR: 18 (20 Sep 2024 06:06) (16 - 18)  SpO2: 94% (20 Sep 2024 06:06) (92% - 96%)    Parameters below as of 19 Sep 2024 21:26  Patient On (Oxygen Delivery Method): nasal cannula          MEDICATIONS:  MEDICATIONS  (STANDING):  artificial  tears Solution 1 Drop(s) Both EYES four times a day  atorvastatin 40 milliGRAM(s) Oral at bedtime  chlorhexidine 2% Cloths 1 Application(s) Topical <User Schedule>  dextrose 5%. 1000 milliLiter(s) (100 mL/Hr) IV Continuous <Continuous>  dextrose 5%. 1000 milliLiter(s) (50 mL/Hr) IV Continuous <Continuous>  dextrose 50% Injectable 12.5 Gram(s) IV Push once  dextrose 50% Injectable 25 Gram(s) IV Push once  dextrose 50% Injectable 25 Gram(s) IV Push once  furosemide    Tablet 20 milliGRAM(s) Oral daily  glucagon  Injectable 1 milliGRAM(s) IntraMuscular once  heparin   Injectable 7500 Unit(s) SubCutaneous every 8 hours  insulin glargine Injectable (LANTUS) 22 Unit(s) SubCutaneous at bedtime  insulin lispro (ADMELOG) corrective regimen sliding scale   SubCutaneous three times a day before meals  insulin lispro (ADMELOG) corrective regimen sliding scale   SubCutaneous at bedtime  insulin lispro Injectable (ADMELOG) 11 Unit(s) SubCutaneous three times a day before meals  metoprolol tartrate 12.5 milliGRAM(s) Oral every 12 hours  nystatin    Suspension 725166 Unit(s) Swish and Swallow every 6 hours  pantoprazole    Tablet 40 milliGRAM(s) Oral before breakfast  predniSONE   Tablet 40 milliGRAM(s) Oral daily  sodium phosphate 15 milliMole(s)/250 mL IVPB 15 milliMole(s) IV Intermittent once  spironolactone 25 milliGRAM(s) Oral daily  valsartan 40 milliGRAM(s) Oral every 12 hours    MEDICATIONS  (PRN):  dextrose Oral Gel 15 Gram(s) Oral once PRN Blood Glucose LESS THAN 70 milliGRAM(s)/deciliter      ALLERGIES:  Allergies    [place] (Stomach Upset)  [place] (Stomach Upset)  No Known Drug Allergies    Intolerances        LABS:                        13.8   11.50 )-----------( 273      ( 19 Sep 2024 05:50 )             40.2     09-19    138  |  104  |  28[H]  ----------------------------<  116[H]  3.3[L]   |  25  |  1.14    Ca    8.1[L]      19 Sep 2024 05:50  Phos  3.1     09-19  Mg     1.7     09-19        Urinalysis Basic - ( 19 Sep 2024 05:50 )    Color: x / Appearance: x / SG: x / pH: x  Gluc: 116 mg/dL / Ketone: x  / Bili: x / Urobili: x   Blood: x / Protein: x / Nitrite: x   Leuk Esterase: x / RBC: x / WBC x   Sq Epi: x / Non Sq Epi: x / Bacteria: x      CAPILLARY BLOOD GLUCOSE      POCT Blood Glucose.: 184 mg/dL (19 Sep 2024 22:01)      RADIOLOGY & ADDITIONAL TESTS: Reviewed.

## 2024-09-21 LAB
A FLAVUS AB SER QL ID: SIGNIFICANT CHANGE UP
A FUMIGATUS1 AB SER QL ID: SIGNIFICANT CHANGE UP
A FUMIGATUS2 AB SER QL ID: SIGNIFICANT CHANGE UP
A FUMIGATUS3 AB SER QL ID: SIGNIFICANT CHANGE UP
A FUMIGATUS3 AB SER QL ID: SIGNIFICANT CHANGE UP
A FUMIGATUS6 AB SER QL ID: SIGNIFICANT CHANGE UP
A PULLULANS AB SER QL ID: SIGNIFICANT CHANGE UP
ALBUMIN SERPL ELPH-MCNC: 2.9 G/DL — LOW (ref 3.3–5)
ALP SERPL-CCNC: 89 U/L — SIGNIFICANT CHANGE UP (ref 40–120)
ALT FLD-CCNC: 25 U/L — SIGNIFICANT CHANGE UP (ref 10–45)
ANION GAP SERPL CALC-SCNC: 8 MMOL/L — SIGNIFICANT CHANGE UP (ref 5–17)
ANNOTATION COMMENT IMP: SIGNIFICANT CHANGE UP
AST SERPL-CCNC: 19 U/L — SIGNIFICANT CHANGE UP (ref 10–40)
BASOPHILS # BLD AUTO: 0.04 K/UL — SIGNIFICANT CHANGE UP (ref 0–0.2)
BASOPHILS NFR BLD AUTO: 0.3 % — SIGNIFICANT CHANGE UP (ref 0–2)
BEEF IGE QN: <0.1 KU/L — SIGNIFICANT CHANGE UP
BILIRUB SERPL-MCNC: 0.4 MG/DL — SIGNIFICANT CHANGE UP (ref 0.2–1.2)
BUN SERPL-MCNC: 23 MG/DL — SIGNIFICANT CHANGE UP (ref 7–23)
CALCIUM SERPL-MCNC: 8.6 MG/DL — SIGNIFICANT CHANGE UP (ref 8.4–10.5)
CHLORIDE SERPL-SCNC: 107 MMOL/L — SIGNIFICANT CHANGE UP (ref 96–108)
CO2 SERPL-SCNC: 24 MMOL/L — SIGNIFICANT CHANGE UP (ref 22–31)
CREAT SERPL-MCNC: 1.14 MG/DL — SIGNIFICANT CHANGE UP (ref 0.5–1.3)
EGFR: 84 ML/MIN/1.73M2 — SIGNIFICANT CHANGE UP
EOSINOPHIL # BLD AUTO: 0.17 K/UL — SIGNIFICANT CHANGE UP (ref 0–0.5)
EOSINOPHIL NFR BLD AUTO: 1.3 % — SIGNIFICANT CHANGE UP (ref 0–6)
EPID ALLERG MIX73 IGE QN: NEGATIVE KU/L — SIGNIFICANT CHANGE UP
GLUCOSE BLDC GLUCOMTR-MCNC: 118 MG/DL — HIGH (ref 70–99)
GLUCOSE BLDC GLUCOMTR-MCNC: 127 MG/DL — HIGH (ref 70–99)
GLUCOSE BLDC GLUCOMTR-MCNC: 159 MG/DL — HIGH (ref 70–99)
GLUCOSE BLDC GLUCOMTR-MCNC: 173 MG/DL — HIGH (ref 70–99)
GLUCOSE SERPL-MCNC: 156 MG/DL — HIGH (ref 70–99)
HAV IGM SER-ACNC: SIGNIFICANT CHANGE UP
HBV CORE AB SER-ACNC: SIGNIFICANT CHANGE UP
HBV CORE IGM SER-ACNC: SIGNIFICANT CHANGE UP
HBV SURFACE AB SER-ACNC: REACTIVE
HBV SURFACE AG SER-ACNC: SIGNIFICANT CHANGE UP
HCT VFR BLD CALC: 41.5 % — SIGNIFICANT CHANGE UP (ref 39–50)
HCV AB S/CO SERPL IA: 0.05 S/CO — SIGNIFICANT CHANGE UP
HCV AB SERPL-IMP: SIGNIFICANT CHANGE UP
HGB BLD-MCNC: 13.9 G/DL — SIGNIFICANT CHANGE UP (ref 13–17)
IMM GRANULOCYTES NFR BLD AUTO: 3.9 % — HIGH (ref 0–0.9)
LYMPHOCYTES # BLD AUTO: 2.9 K/UL — SIGNIFICANT CHANGE UP (ref 1–3.3)
LYMPHOCYTES # BLD AUTO: 22.1 % — SIGNIFICANT CHANGE UP (ref 13–44)
MAGNESIUM SERPL-MCNC: 1.8 MG/DL — SIGNIFICANT CHANGE UP (ref 1.6–2.6)
MCHC RBC-ENTMCNC: 28.6 PG — SIGNIFICANT CHANGE UP (ref 27–34)
MCHC RBC-ENTMCNC: 33.5 GM/DL — SIGNIFICANT CHANGE UP (ref 32–36)
MCV RBC AUTO: 85.4 FL — SIGNIFICANT CHANGE UP (ref 80–100)
MONOCYTES # BLD AUTO: 1.28 K/UL — HIGH (ref 0–0.9)
MONOCYTES NFR BLD AUTO: 9.8 % — SIGNIFICANT CHANGE UP (ref 2–14)
NEUTROPHILS # BLD AUTO: 8.2 K/UL — HIGH (ref 1.8–7.4)
NEUTROPHILS NFR BLD AUTO: 62.6 % — SIGNIFICANT CHANGE UP (ref 43–77)
NRBC # BLD: 0 /100 WBCS — SIGNIFICANT CHANGE UP (ref 0–0)
P BETAE IGE QN: 0.22 KU/L — SIGNIFICANT CHANGE UP
PHOSPHATE SERPL-MCNC: 3.7 MG/DL — SIGNIFICANT CHANGE UP (ref 2.5–4.5)
PIGEON SERUM AB QL ID: SIGNIFICANT CHANGE UP
PLATELET # BLD AUTO: 276 K/UL — SIGNIFICANT CHANGE UP (ref 150–400)
PORK IGE QN: <0.1 KU/L — SIGNIFICANT CHANGE UP
POTASSIUM SERPL-MCNC: 3.9 MMOL/L — SIGNIFICANT CHANGE UP (ref 3.5–5.3)
POTASSIUM SERPL-SCNC: 3.9 MMOL/L — SIGNIFICANT CHANGE UP (ref 3.5–5.3)
PROT SERPL-MCNC: 5.8 G/DL — LOW (ref 6–8.3)
RBC # BLD: 4.86 M/UL — SIGNIFICANT CHANGE UP (ref 4.2–5.8)
RBC # FLD: 12.1 % — SIGNIFICANT CHANGE UP (ref 10.3–14.5)
S RECTIVIRGULA AB SER QL ID: SIGNIFICANT CHANGE UP
SODIUM SERPL-SCNC: 139 MMOL/L — SIGNIFICANT CHANGE UP (ref 135–145)
T CANDIDUS AB SER QL: SIGNIFICANT CHANGE UP
WBC # BLD: 13.1 K/UL — HIGH (ref 3.8–10.5)
WBC # FLD AUTO: 13.1 K/UL — HIGH (ref 3.8–10.5)

## 2024-09-21 PROCEDURE — 99233 SBSQ HOSP IP/OBS HIGH 50: CPT | Mod: GC

## 2024-09-21 RX ADMIN — Medication 500000 UNIT(S): at 01:46

## 2024-09-21 RX ADMIN — Medication 7500 UNIT(S): at 22:06

## 2024-09-21 RX ADMIN — PANTOPRAZOLE SODIUM 40 MILLIGRAM(S): 40 TABLET, DELAYED RELEASE ORAL at 07:15

## 2024-09-21 RX ADMIN — Medication 11 UNIT(S): at 18:40

## 2024-09-21 RX ADMIN — Medication 7500 UNIT(S): at 05:26

## 2024-09-21 RX ADMIN — Medication 25 MILLIGRAM(S): at 07:15

## 2024-09-21 RX ADMIN — Medication 11 UNIT(S): at 13:48

## 2024-09-21 RX ADMIN — VALSARTAN 80 MILLIGRAM(S): 320 TABLET ORAL at 08:57

## 2024-09-21 RX ADMIN — Medication 7500 UNIT(S): at 13:48

## 2024-09-21 RX ADMIN — Medication 1 DROP(S): at 18:42

## 2024-09-21 RX ADMIN — Medication 500000 UNIT(S): at 18:40

## 2024-09-21 RX ADMIN — Medication 2: at 13:48

## 2024-09-21 RX ADMIN — Medication 2: at 09:49

## 2024-09-21 RX ADMIN — ATORVASTATIN CALCIUM 40 MILLIGRAM(S): 10 TABLET, FILM COATED ORAL at 22:05

## 2024-09-21 RX ADMIN — INSULIN GLARGINE 22 UNIT(S): 300 INJECTION, SOLUTION SUBCUTANEOUS at 22:25

## 2024-09-21 RX ADMIN — SPIRONOLACTONE 25 MILLIGRAM(S): 100 TABLET, FILM COATED ORAL at 07:15

## 2024-09-21 RX ADMIN — FUROSEMIDE 20 MILLIGRAM(S): 10 INJECTION INTRAVENOUS at 05:30

## 2024-09-21 RX ADMIN — PREDNISONE 20 MILLIGRAM(S): 5 TABLET ORAL at 05:24

## 2024-09-21 RX ADMIN — Medication 1 DROP(S): at 01:00

## 2024-09-21 RX ADMIN — Medication 11 UNIT(S): at 09:50

## 2024-09-21 RX ADMIN — VALSARTAN 80 MILLIGRAM(S): 320 TABLET ORAL at 22:04

## 2024-09-21 RX ADMIN — CHLORHEXIDINE GLUCONATE ORAL RINSE 1 APPLICATION(S): 1.2 SOLUTION DENTAL at 05:28

## 2024-09-21 RX ADMIN — Medication 500000 UNIT(S): at 11:35

## 2024-09-21 RX ADMIN — Medication 500000 UNIT(S): at 05:24

## 2024-09-21 RX ADMIN — Medication 1 DROP(S): at 11:35

## 2024-09-21 RX ADMIN — Medication 1 DROP(S): at 05:27

## 2024-09-21 NOTE — PROGRESS NOTE ADULT - PROBLEM SELECTOR PLAN 1
Possibly 2/2 hypersensitivity pneumonitis vs eosinophilic pneumonia -- etiology is unclear with severe PNA and ARDS. Newly reduced EF with cardiogenic shock at [place] suggesting flash pulmonary edema as possible cause   Initially presented to [place] hospital on 9/10 for SOB, PERRY, cough x 2 weeks. Found to have increasing O2 requirements initially placed on NC > HFNC > BiPAP, eventually requiring intubation  CXR with multi-focal pneumonia.   Pt improved rapidly after treatment, which was highly concerning for hypersensitivity pneumonitis vs eosinophilic pneumonia  Patient was treated with CTX 9/10-9/14, Azithro 9/10-9/12, Bactrim 9/11-9/12, iv solumedrol 9/11-9/14 at OSH.   s/p Bronchoscopy 9/13; s/p zosyn 7d course (received half of it at [facility])  Repeat CT- chest with improved bilateral infiltrates  Fungitell, autoimmune workup negative   Coxsackie A and B titers positive    Plan:  - OOB, IS  - prednisone 40 mg today, then 20 mg tomorrow, then taper down to 10mg on 9/22, then 5mg on 9/23  - wean oxygen as tolerated  - Needs pulmonology outpatient follow up in [place]  - c/w PT Possibly 2/2 hypersensitivity pneumonitis vs eosinophilic pneumonia -- etiology is unclear with severe PNA and ARDS. Newly reduced EF with cardiogenic shock at [facility] suggesting flash pulmonary edema as possible cause   Initially presented to [facility] hospital on 9/10 for SOB, PERRY, cough x 2 weeks. Found to have increasing O2 requirements initially placed on NC > HFNC > BiPAP, eventually requiring intubation  CXR with multi-focal pneumonia.   Pt improved rapidly after treatment, which was highly concerning for hypersensitivity pneumonitis vs eosinophilic pneumonia  Patient was treated with CTX 9/10-9/14, Azithro 9/10-9/12, Bactrim 9/11-9/12, iv solumedrol 9/11-9/14 at OSH.   s/p Bronchoscopy 9/13; s/p zosyn 7d course (received half of it at [facility])  Repeat CT- chest with improved bilateral infiltrates  Fungitell, autoimmune workup negative   Coxsackie A and B titers positive    Plan:  - OOB, IS  - prednisone 20 mg today, then taper down to 10mg on 9/22, then 5mg on 9/23  - wean oxygen as tolerated  - Needs pulmonology outpatient follow up in [place]  - c/w PT

## 2024-09-21 NOTE — PROGRESS NOTE ADULT - PROBLEM SELECTOR PLAN 2
While at [facility] pt was found  to have EF 25% on TTE with global left ventricular hypokinesis requiring  levophed and dobutamine  Ddx include ischemic cardiomyopathy vs eosinophilic myocardiosis (given high suspension for eosinophilic pneumonia after improvement with IV steroids) vs alcohol induced cardiomyopathy (given history of alcohol use).   POCUS on admission 9/14 showed moderate to severe LV dysfunction with global hypokinesis  Now off pressor support. A-line removed  Repeat TTE on 9/16, 40-45%, improved reported TTE at [place] 25%.     Plan  - increased valsartan to 40mg BID with hold parameters  - c/w lopressor 12.5mg BID  - c/w lasix 20mg qd  - c/w spironolactone 25mg qd  - c/w lipitor 40mg qhs  - NPO for MR cardiac today to assess for myocarditis and scar tissue  - Can consider CCTA in future for ischemic evaluation  - Goal MAP >65  - Cardiology following, recs appreciated While at [place] hospital pt was found  to have EF 25% on TTE with global left ventricular hypokinesis requiring  levophed and dobutamine  Ddx include ischemic cardiomyopathy vs eosinophilic myocardiosis (given high suspension for eosinophilic pneumonia after improvement with IV steroids) vs alcohol induced cardiomyopathy (given history of alcohol use).   POCUS on admission 9/14 showed moderate to severe LV dysfunction with global hypokinesis  Now off pressor support. A-line removed  Repeat TTE on 9/16, 40-45%, improved reported TTE at [place] 25%.   Cardiac MRA done on 9/20 pending official read    Plan  - c/w valsartan to 40mg BID with hold parameters  - c/w lopressor 12.5mg BID  - c/w lasix 20mg qd  - c/w spironolactone 25mg qd  - c/w lipitor 40mg qhs  - Goal MAP >65  - Cardiology following, recs appreciated -- plans for CCTA on Monday, 9/23 pending official MRA read

## 2024-09-21 NOTE — PROGRESS NOTE ADULT - SUBJECTIVE AND OBJECTIVE BOX
OVERNIGHT EVENTS:    SUBJECTIVE / INTERVAL HPI: Patient seen and examined at bedside.       PHYSICAL EXAM:    General: Lying comfortably and in no acute distress  HEENT: NC/AT; EOMI, anicteric sclera; MMM  Neck: supple  Cardiovascular: +S1/S2, RRR  Respiratory: CTA B/L; no W/R/R  Gastrointestinal: soft, NT/ND; +BSx4  Extremities: WWP; no edema, clubbing or cyanosis  Vascular: 2+ radial pulses B/L  Neurological: AAOx3; no focal deficits  Psychiatric: pleasant mood and affect  Dermatologic: no appreciable wounds or damage to the skin    VITAL SIGNS:  Vital Signs Last 24 Hrs  T(C): 37.2 (21 Sep 2024 05:13), Max: 37.2 (21 Sep 2024 05:13)  T(F): 98.9 (21 Sep 2024 05:13), Max: 98.9 (21 Sep 2024 05:13)  HR: 95 (21 Sep 2024 05:13) (71 - 95)  BP: 128/81 (21 Sep 2024 05:13) (124/84 - 139/89)  BP(mean): --  RR: 18 (21 Sep 2024 05:13) (18 - 18)  SpO2: 94% (21 Sep 2024 05:13) (94% - 95%)    Parameters below as of 21 Sep 2024 05:13  Patient On (Oxygen Delivery Method): room air          MEDICATIONS:  MEDICATIONS  (STANDING):  artificial  tears Solution 1 Drop(s) Both EYES four times a day  atorvastatin 40 milliGRAM(s) Oral at bedtime  chlorhexidine 2% Cloths 1 Application(s) Topical <User Schedule>  dextrose 5%. 1000 milliLiter(s) (100 mL/Hr) IV Continuous <Continuous>  dextrose 5%. 1000 milliLiter(s) (50 mL/Hr) IV Continuous <Continuous>  dextrose 50% Injectable 12.5 Gram(s) IV Push once  dextrose 50% Injectable 25 Gram(s) IV Push once  dextrose 50% Injectable 25 Gram(s) IV Push once  furosemide    Tablet 20 milliGRAM(s) Oral daily  glucagon  Injectable 1 milliGRAM(s) IntraMuscular once  heparin   Injectable 7500 Unit(s) SubCutaneous every 8 hours  insulin glargine Injectable (LANTUS) 22 Unit(s) SubCutaneous at bedtime  insulin lispro (ADMELOG) corrective regimen sliding scale   SubCutaneous three times a day before meals  insulin lispro (ADMELOG) corrective regimen sliding scale   SubCutaneous at bedtime  insulin lispro Injectable (ADMELOG) 11 Unit(s) SubCutaneous three times a day before meals  metoprolol succinate ER 25 milliGRAM(s) Oral every 24 hours  nystatin    Suspension [id number] Unit(s) Swish and Swallow every 6 hours  pantoprazole    Tablet 40 milliGRAM(s) Oral before breakfast  sodium phosphate 15 milliMole(s)/250 mL IVPB 15 milliMole(s) IV Intermittent once  spironolactone 25 milliGRAM(s) Oral daily  valsartan 80 milliGRAM(s) Oral every 12 hours    MEDICATIONS  (PRN):  dextrose Oral Gel 15 Gram(s) Oral once PRN Blood Glucose LESS THAN 70 milliGRAM(s)/deciliter      ALLERGIES:  Allergies    [place] (Stomach Upset)  [place] (Stomach Upset)  No Known Drug Allergies    Intolerances        LABS:                        14.4   12.31 )-----------( 294      ( 20 Sep 2024 05:30 )             43.7     09-20    136  |  105  |  21  ----------------------------<  97  4.3   |  19[L]  |  0.92    Ca    8.4      20 Sep 2024 07:29  Phos  3.3     09-20  Mg     1.8     09-20    TPro  5.5[L]  /  Alb  2.6[L]  /  TBili  0.5  /  DBili  x   /  AST  19  /  ALT  18  /  AlkPhos  66  09-20      Urinalysis Basic - ( 20 Sep 2024 07:29 )    Color: x / Appearance: x / SG: x / pH: x  Gluc: 97 mg/dL / Ketone: x  / Bili: x / Urobili: x   Blood: x / Protein: x / Nitrite: x   Leuk Esterase: x / RBC: x / WBC x   Sq Epi: x / Non Sq Epi: x / Bacteria: x      CAPILLARY BLOOD GLUCOSE      POCT Blood Glucose.: 172 mg/dL (20 Sep 2024 22:03)      RADIOLOGY & ADDITIONAL TESTS: Reviewed. OVERNIGHT EVENTS: On RA for a couple hours overnight, saturating 92-94%    SUBJECTIVE / INTERVAL HPI: Patient seen and examined at bedside. Pt denies any new complaints at this time, reports he does not feel SOB at rest but does develop some dyspnea with ambulation. He denies     PHYSICAL EXAM:    General: Lying comfortably and in no acute distress  HEENT: NC/AT; EOMI, anicteric sclera; MMM  Neck: supple  Cardiovascular: +S1/S2, RRR  Respiratory: CTA B/L; no W/R/R  Gastrointestinal: soft, NT/ND; +BSx4  Extremities: WWP; no edema, clubbing or cyanosis  Vascular: 2+ radial pulses B/L  Neurological: AAOx3; no focal deficits  Psychiatric: pleasant mood and affect  Dermatologic: no appreciable wounds or damage to the skin    VITAL SIGNS:  Vital Signs Last 24 Hrs  T(C): 37.2 (21 Sep 2024 05:13), Max: 37.2 (21 Sep 2024 05:13)  T(F): 98.9 (21 Sep 2024 05:13), Max: 98.9 (21 Sep 2024 05:13)  HR: 95 (21 Sep 2024 05:13) (71 - 95)  BP: 128/81 (21 Sep 2024 05:13) (124/84 - 139/89)  BP(mean): --  RR: 18 (21 Sep 2024 05:13) (18 - 18)  SpO2: 94% (21 Sep 2024 05:13) (94% - 95%)    Parameters below as of 21 Sep 2024 05:13  Patient On (Oxygen Delivery Method): room air          MEDICATIONS:  MEDICATIONS  (STANDING):  artificial  tears Solution 1 Drop(s) Both EYES four times a day  atorvastatin 40 milliGRAM(s) Oral at bedtime  chlorhexidine 2% Cloths 1 Application(s) Topical <User Schedule>  dextrose 5%. 1000 milliLiter(s) (100 mL/Hr) IV Continuous <Continuous>  dextrose 5%. 1000 milliLiter(s) (50 mL/Hr) IV Continuous <Continuous>  dextrose 50% Injectable 12.5 Gram(s) IV Push once  dextrose 50% Injectable 25 Gram(s) IV Push once  dextrose 50% Injectable 25 Gram(s) IV Push once  furosemide    Tablet 20 milliGRAM(s) Oral daily  glucagon  Injectable 1 milliGRAM(s) IntraMuscular once  heparin   Injectable 7500 Unit(s) SubCutaneous every 8 hours  insulin glargine Injectable (LANTUS) 22 Unit(s) SubCutaneous at bedtime  insulin lispro (ADMELOG) corrective regimen sliding scale   SubCutaneous three times a day before meals  insulin lispro (ADMELOG) corrective regimen sliding scale   SubCutaneous at bedtime  insulin lispro Injectable (ADMELOG) 11 Unit(s) SubCutaneous three times a day before meals  metoprolol succinate ER 25 milliGRAM(s) Oral every 24 hours  nystatin    Suspension 421717 Unit(s) Swish and Swallow every 6 hours  pantoprazole    Tablet 40 milliGRAM(s) Oral before breakfast  sodium phosphate 15 milliMole(s)/250 mL IVPB 15 milliMole(s) IV Intermittent once  spironolactone 25 milliGRAM(s) Oral daily  valsartan 80 milliGRAM(s) Oral every 12 hours    MEDICATIONS  (PRN):  dextrose Oral Gel 15 Gram(s) Oral once PRN Blood Glucose LESS THAN 70 milliGRAM(s)/deciliter      ALLERGIES:  Allergies    [place] (Stomach Upset)  [place] (Stomach Upset)  No Known Drug Allergies    Intolerances        LABS:                        14.4   12.31 )-----------( 294      ( 20 Sep 2024 05:30 )             43.7     09-20    136  |  105  |  21  ----------------------------<  97  4.3   |  19[L]  |  0.92    Ca    8.4      20 Sep 2024 07:29  Phos  3.3     09-20  Mg     1.8     09-20    TPro  5.5[L]  /  Alb  2.6[L]  /  TBili  0.5  /  DBili  x   /  AST  19  /  ALT  18  /  AlkPhos  66  09-20      Urinalysis Basic - ( 20 Sep 2024 07:29 )    Color: x / Appearance: x / SG: x / pH: x  Gluc: 97 mg/dL / Ketone: x  / Bili: x / Urobili: x   Blood: x / Protein: x / Nitrite: x   Leuk Esterase: x / RBC: x / WBC x   Sq Epi: x / Non Sq Epi: x / Bacteria: x      CAPILLARY BLOOD GLUCOSE      POCT Blood Glucose.: 172 mg/dL (20 Sep 2024 22:03)      RADIOLOGY & ADDITIONAL TESTS: Reviewed. OVERNIGHT EVENTS: On RA for a couple hours overnight, saturating 92-94%    SUBJECTIVE / INTERVAL HPI: Patient seen and examined at bedside. Pt denies any new complaints at this time, reports he does not feel SOB at rest but does continue to develop some dyspnea with ambulation. He denies any F/C, N/V, CP, abdominal pain, urinary symptoms, or worsening LE swelling.    PHYSICAL EXAM:    General: Lying comfortably and in no acute distress  HEENT: NC/AT; EOMI, anicteric sclera; MMM  Neck: supple  Cardiovascular: +S1/S2, RRR  Respiratory: CTA B/L; no W/R/R  Gastrointestinal: soft, NT/ND; +BSx4  Extremities: WWP; no clubbing or cyanosis. 1+ bilateral pitting edema  Vascular: 2+ radial pulses B/L  Neurological: AAOx3; no focal deficits  Psychiatric: pleasant mood and affect  Dermatologic: no appreciable wounds or damage to the skin    VITAL SIGNS:  Vital Signs Last 24 Hrs  T(C): 37.2 (21 Sep 2024 05:13), Max: 37.2 (21 Sep 2024 05:13)  T(F): 98.9 (21 Sep 2024 05:13), Max: 98.9 (21 Sep 2024 05:13)  HR: 95 (21 Sep 2024 05:13) (71 - 95)  BP: 128/81 (21 Sep 2024 05:13) (124/84 - 139/89)  BP(mean): --  RR: 18 (21 Sep 2024 05:13) (18 - 18)  SpO2: 94% (21 Sep 2024 05:13) (94% - 95%)    Parameters below as of 21 Sep 2024 05:13  Patient On (Oxygen Delivery Method): room air          MEDICATIONS:  MEDICATIONS  (STANDING):  artificial  tears Solution 1 Drop(s) Both EYES four times a day  atorvastatin 40 milliGRAM(s) Oral at bedtime  chlorhexidine 2% Cloths 1 Application(s) Topical <User Schedule>  dextrose 5%. 1000 milliLiter(s) (100 mL/Hr) IV Continuous <Continuous>  dextrose 5%. 1000 milliLiter(s) (50 mL/Hr) IV Continuous <Continuous>  dextrose 50% Injectable 12.5 Gram(s) IV Push once  dextrose 50% Injectable 25 Gram(s) IV Push once  dextrose 50% Injectable 25 Gram(s) IV Push once  furosemide    Tablet 20 milliGRAM(s) Oral daily  glucagon  Injectable 1 milliGRAM(s) IntraMuscular once  heparin   Injectable 7500 Unit(s) SubCutaneous every 8 hours  insulin glargine Injectable (LANTUS) 22 Unit(s) SubCutaneous at bedtime  insulin lispro (ADMELOG) corrective regimen sliding scale   SubCutaneous three times a day before meals  insulin lispro (ADMELOG) corrective regimen sliding scale   SubCutaneous at bedtime  insulin lispro Injectable (ADMELOG) 11 Unit(s) SubCutaneous three times a day before meals  metoprolol succinate ER 25 milliGRAM(s) Oral every 24 hours  nystatin    Suspension [id number] Unit(s) Swish and Swallow every 6 hours  pantoprazole    Tablet 40 milliGRAM(s) Oral before breakfast  sodium phosphate 15 milliMole(s)/250 mL IVPB 15 milliMole(s) IV Intermittent once  spironolactone 25 milliGRAM(s) Oral daily  valsartan 80 milliGRAM(s) Oral every 12 hours    MEDICATIONS  (PRN):  dextrose Oral Gel 15 Gram(s) Oral once PRN Blood Glucose LESS THAN 70 milliGRAM(s)/deciliter      ALLERGIES:  Allergies    [place] (Stomach Upset)  [place] (Stomach Upset)  No Known Drug Allergies    Intolerances        LABS:                        14.4   12.31 )-----------( 294      ( 20 Sep 2024 05:30 )             43.7     09-20    136  |  105  |  21  ----------------------------<  97  4.3   |  19[L]  |  0.92    Ca    8.4      20 Sep 2024 07:29  Phos  3.3     09-20  Mg     1.8     09-20    TPro  5.5[L]  /  Alb  2.6[L]  /  TBili  0.5  /  DBili  x   /  AST  19  /  ALT  18  /  AlkPhos  66  09-20      Urinalysis Basic - ( 20 Sep 2024 07:29 )    Color: x / Appearance: x / SG: x / pH: x  Gluc: 97 mg/dL / Ketone: x  / Bili: x / Urobili: x   Blood: x / Protein: x / Nitrite: x   Leuk Esterase: x / RBC: x / WBC x   Sq Epi: x / Non Sq Epi: x / Bacteria: x      CAPILLARY BLOOD GLUCOSE      POCT Blood Glucose.: 172 mg/dL (20 Sep 2024 22:03)      RADIOLOGY & ADDITIONAL TESTS: Reviewed.

## 2024-09-21 NOTE — PROGRESS NOTE ADULT - ATTENDING COMMENTS
38-year-old male hypertension, DM2, HLD, hepatitis admitted for treatment of acute hypoxic respiratory failure 2/2 hypersensitivity pneumonitis vs eosinophilic pneumonia, course complicated by cardiogenic shock, now of pressor support, stepped down to medicine telemetry for further management of AHRF 2/2 possible hypersensitivity pneumonitis vs. eosinophilic pneumonia, currently on 2L, saturating 94-95%, now stable for RMF     Labs and imaging reviewed    Problem List  #Cardiogenic Shock  #Cardiomyopathy  #Acute HYpoxic Resp FAilure   #Eosinophilic PNA    Plan  -cMRI done, will need official read, if no sign of scarring would defer cardiac cath. Given contrast load given to patient during this admission would defer CCTA in favor of planned cardiac cath for eval     Patient would be medically ready pending etiology of cardiomyopathy from cMRI

## 2024-09-22 LAB
ALBUMIN SERPL ELPH-MCNC: 3.1 G/DL — LOW (ref 3.3–5)
ALP SERPL-CCNC: 101 U/L — SIGNIFICANT CHANGE UP (ref 40–120)
ALT FLD-CCNC: 26 U/L — SIGNIFICANT CHANGE UP (ref 10–45)
ANION GAP SERPL CALC-SCNC: 8 MMOL/L — SIGNIFICANT CHANGE UP (ref 5–17)
AST SERPL-CCNC: 18 U/L — SIGNIFICANT CHANGE UP (ref 10–40)
BASOPHILS # BLD AUTO: 0.06 K/UL — SIGNIFICANT CHANGE UP (ref 0–0.2)
BASOPHILS NFR BLD AUTO: 0.5 % — SIGNIFICANT CHANGE UP (ref 0–2)
BILIRUB SERPL-MCNC: 0.3 MG/DL — SIGNIFICANT CHANGE UP (ref 0.2–1.2)
BUN SERPL-MCNC: 20 MG/DL — SIGNIFICANT CHANGE UP (ref 7–23)
CALCIUM SERPL-MCNC: 8.9 MG/DL — SIGNIFICANT CHANGE UP (ref 8.4–10.5)
CHLORIDE SERPL-SCNC: 107 MMOL/L — SIGNIFICANT CHANGE UP (ref 96–108)
CO2 SERPL-SCNC: 26 MMOL/L — SIGNIFICANT CHANGE UP (ref 22–31)
CREAT SERPL-MCNC: 1.1 MG/DL — SIGNIFICANT CHANGE UP (ref 0.5–1.3)
EGFR: 88 ML/MIN/1.73M2 — SIGNIFICANT CHANGE UP
EOSINOPHIL # BLD AUTO: 0.14 K/UL — SIGNIFICANT CHANGE UP (ref 0–0.5)
EOSINOPHIL NFR BLD AUTO: 1.1 % — SIGNIFICANT CHANGE UP (ref 0–6)
GLUCOSE BLDC GLUCOMTR-MCNC: 115 MG/DL — HIGH (ref 70–99)
GLUCOSE BLDC GLUCOMTR-MCNC: 120 MG/DL — HIGH (ref 70–99)
GLUCOSE BLDC GLUCOMTR-MCNC: 169 MG/DL — HIGH (ref 70–99)
GLUCOSE BLDC GLUCOMTR-MCNC: 178 MG/DL — HIGH (ref 70–99)
GLUCOSE SERPL-MCNC: 153 MG/DL — HIGH (ref 70–99)
H CAPSUL AG SPEC-ACNC: SIGNIFICANT CHANGE UP
H CAPSUL AG UR QL IA: SIGNIFICANT CHANGE UP
HCT VFR BLD CALC: 41.5 % — SIGNIFICANT CHANGE UP (ref 39–50)
HGB BLD-MCNC: 13.5 G/DL — SIGNIFICANT CHANGE UP (ref 13–17)
IMM GRANULOCYTES NFR BLD AUTO: 5.5 % — HIGH (ref 0–0.9)
LYMPHOCYTES # BLD AUTO: 2.82 K/UL — SIGNIFICANT CHANGE UP (ref 1–3.3)
LYMPHOCYTES # BLD AUTO: 22.1 % — SIGNIFICANT CHANGE UP (ref 13–44)
MAGNESIUM SERPL-MCNC: 1.6 MG/DL — SIGNIFICANT CHANGE UP (ref 1.6–2.6)
MCHC RBC-ENTMCNC: 27.3 PG — SIGNIFICANT CHANGE UP (ref 27–34)
MCHC RBC-ENTMCNC: 32.5 GM/DL — SIGNIFICANT CHANGE UP (ref 32–36)
MCV RBC AUTO: 84 FL — SIGNIFICANT CHANGE UP (ref 80–100)
MONOCYTES # BLD AUTO: 1.09 K/UL — HIGH (ref 0–0.9)
MONOCYTES NFR BLD AUTO: 8.6 % — SIGNIFICANT CHANGE UP (ref 2–14)
NEUTROPHILS # BLD AUTO: 7.93 K/UL — HIGH (ref 1.8–7.4)
NEUTROPHILS NFR BLD AUTO: 62.2 % — SIGNIFICANT CHANGE UP (ref 43–77)
NRBC # BLD: 0 /100 WBCS — SIGNIFICANT CHANGE UP (ref 0–0)
PHOSPHATE SERPL-MCNC: 5 MG/DL — HIGH (ref 2.5–4.5)
PLATELET # BLD AUTO: 282 K/UL — SIGNIFICANT CHANGE UP (ref 150–400)
POTASSIUM SERPL-MCNC: 4.1 MMOL/L — SIGNIFICANT CHANGE UP (ref 3.5–5.3)
POTASSIUM SERPL-SCNC: 4.1 MMOL/L — SIGNIFICANT CHANGE UP (ref 3.5–5.3)
PROT SERPL-MCNC: 5.9 G/DL — LOW (ref 6–8.3)
RBC # BLD: 4.94 M/UL — SIGNIFICANT CHANGE UP (ref 4.2–5.8)
RBC # FLD: 12 % — SIGNIFICANT CHANGE UP (ref 10.3–14.5)
SODIUM SERPL-SCNC: 141 MMOL/L — SIGNIFICANT CHANGE UP (ref 135–145)
WBC # BLD: 12.74 K/UL — HIGH (ref 3.8–10.5)
WBC # FLD AUTO: 12.74 K/UL — HIGH (ref 3.8–10.5)

## 2024-09-22 PROCEDURE — 99232 SBSQ HOSP IP/OBS MODERATE 35: CPT

## 2024-09-22 PROCEDURE — 99233 SBSQ HOSP IP/OBS HIGH 50: CPT

## 2024-09-22 RX ADMIN — Medication 7500 UNIT(S): at 06:39

## 2024-09-22 RX ADMIN — Medication 500000 UNIT(S): at 12:44

## 2024-09-22 RX ADMIN — SPIRONOLACTONE 25 MILLIGRAM(S): 100 TABLET, FILM COATED ORAL at 06:38

## 2024-09-22 RX ADMIN — Medication 11 UNIT(S): at 10:11

## 2024-09-22 RX ADMIN — Medication 2: at 10:11

## 2024-09-22 RX ADMIN — Medication 1 DROP(S): at 12:44

## 2024-09-22 RX ADMIN — Medication 500000 UNIT(S): at 06:39

## 2024-09-22 RX ADMIN — PANTOPRAZOLE SODIUM 40 MILLIGRAM(S): 40 TABLET, DELAYED RELEASE ORAL at 06:39

## 2024-09-22 RX ADMIN — VALSARTAN 80 MILLIGRAM(S): 320 TABLET ORAL at 10:18

## 2024-09-22 RX ADMIN — Medication 11 UNIT(S): at 14:07

## 2024-09-22 RX ADMIN — Medication 500000 UNIT(S): at 00:53

## 2024-09-22 RX ADMIN — Medication 1 DROP(S): at 18:30

## 2024-09-22 RX ADMIN — PREDNISONE 10 MILLIGRAM(S): 5 TABLET ORAL at 06:43

## 2024-09-22 RX ADMIN — Medication 500000 UNIT(S): at 18:20

## 2024-09-22 RX ADMIN — Medication 11 UNIT(S): at 18:20

## 2024-09-22 RX ADMIN — INSULIN GLARGINE 22 UNIT(S): 300 INJECTION, SOLUTION SUBCUTANEOUS at 22:19

## 2024-09-22 RX ADMIN — VALSARTAN 80 MILLIGRAM(S): 320 TABLET ORAL at 21:58

## 2024-09-22 RX ADMIN — FUROSEMIDE 20 MILLIGRAM(S): 10 INJECTION INTRAVENOUS at 06:39

## 2024-09-22 RX ADMIN — Medication 7500 UNIT(S): at 21:59

## 2024-09-22 RX ADMIN — Medication 2: at 14:08

## 2024-09-22 RX ADMIN — ATORVASTATIN CALCIUM 40 MILLIGRAM(S): 10 TABLET, FILM COATED ORAL at 21:59

## 2024-09-22 RX ADMIN — CHLORHEXIDINE GLUCONATE ORAL RINSE 1 APPLICATION(S): 1.2 SOLUTION DENTAL at 06:44

## 2024-09-22 RX ADMIN — Medication 7500 UNIT(S): at 14:09

## 2024-09-22 RX ADMIN — Medication 25 MILLIGRAM(S): at 06:38

## 2024-09-22 NOTE — PROGRESS NOTE ADULT - SUBJECTIVE AND OBJECTIVE BOX
Weekend PN:  No acute events overnight or over the weekend. Tolerating PO. No new complaints.    CAPILLARY BLOOD GLUCOSE      POCT Blood Glucose.: 115 mg/dL (22 Sep 2024 17:54)  POCT Blood Glucose.: 169 mg/dL (22 Sep 2024 13:42)  POCT Blood Glucose.: 178 mg/dL (22 Sep 2024 09:12)  POCT Blood Glucose.: 127 mg/dL (21 Sep 2024 22:13)        REVIEW OF SYSTEMS  Constitutional:  Negative fever, chills or loss of appetite.  Eyes:  Negative blurry vision or double vision.  Cardiovascular:  Negative for chest pain or palpitations.  Respiratory:  Negative for cough, wheezing, or shortness of breath.    Gastrointestinal:  Negative for nausea, vomiting, diarrhea, constipation, or abdominal pain.  Genitourinary:  Negative frequency, urgency or dysuria.  Neurologic:  No headache, confusion, dizziness, lightheadedness.    PHYSICAL EXAM  Vital Signs Last 24 Hrs  T(C): 36.8 (22 Sep 2024 21:14), Max: 36.9 (22 Sep 2024 05:45)  T(F): 98.2 (22 Sep 2024 21:14), Max: 98.4 (22 Sep 2024 05:45)  HR: 100 (22 Sep 2024 21:14) (91 - 100)  BP: 135/80 (22 Sep 2024 21:14) (127/88 - 137/91)  BP(mean): --  RR: 17 (22 Sep 2024 21:14) (17 - 18)  SpO2: 93% (22 Sep 2024 21:14) (92% - 94%)    Parameters below as of 22 Sep 2024 21:14  Patient On (Oxygen Delivery Method): room air            Constitutional: Awake, alert, in no acute distress.   HEENT: Normocephalic, atraumatic, BONNIE.  Respiratory: Lungs clear to ausculation bilaterally.   Cardiovascular: regular rhythm, normal S1 and S2, no audible murmurs.   GI: soft, non-tender, non-distended, bowel sounds present.  Extremities: No lower extremity edema.  Psychiatric: AAO x 3. Normal affect/mood.     LABS  CBC - WBC/HGB/HTC/PLT: 12.31/14.4/43.7/294 (09-20-24)  BMP - Na/K/Cl/Bicarb/BUN/Cr/Gluc/AG/eGFR: 136/4.3/105/19/21/0.92/97/12/109 (09-20-24)  Ca - 8.4 (09-20-24)  Phos - 3.3 (09-20-24)  Mg - 1.8 (09-20-24)  LFT - Alb/Tprot/Tbili/Dbili/AlkPhos/ALT/AST: 2.6/--/0.5/--/66/18/19 (09-20-24)  PT/aPTT/INR: 12.2/24.7/1.07 (09-14-24)   Thyroid Stimulating Hormone, Serum: 1.100 (09-17-24)          MEDICATIONS  MEDICATIONS  (STANDING):  artificial  tears Solution 1 Drop(s) Both EYES four times a day  atorvastatin 40 milliGRAM(s) Oral at bedtime  chlorhexidine 2% Cloths 1 Application(s) Topical <User Schedule>  dextrose 5%. 1000 milliLiter(s) (100 mL/Hr) IV Continuous <Continuous>  dextrose 5%. 1000 milliLiter(s) (50 mL/Hr) IV Continuous <Continuous>  dextrose 50% Injectable 12.5 Gram(s) IV Push once  dextrose 50% Injectable 25 Gram(s) IV Push once  dextrose 50% Injectable 25 Gram(s) IV Push once  furosemide    Tablet 20 milliGRAM(s) Oral daily  glucagon  Injectable 1 milliGRAM(s) IntraMuscular once  heparin   Injectable 7500 Unit(s) SubCutaneous every 8 hours  insulin glargine Injectable (LANTUS) 22 Unit(s) SubCutaneous at bedtime  insulin lispro (ADMELOG) corrective regimen sliding scale   SubCutaneous at bedtime  insulin lispro (ADMELOG) corrective regimen sliding scale   SubCutaneous three times a day before meals  insulin lispro Injectable (ADMELOG) 11 Unit(s) SubCutaneous three times a day before meals  metoprolol tartrate 12.5 milliGRAM(s) Oral every 12 hours  nystatin    Suspension 054029 Unit(s) Swish and Swallow every 6 hours  pantoprazole    Tablet 40 milliGRAM(s) Oral before breakfast  predniSONE   Tablet 40 milliGRAM(s) Oral daily  sodium phosphate 15 milliMole(s)/250 mL IVPB 15 milliMole(s) IV Intermittent once  spironolactone 25 milliGRAM(s) Oral daily  valsartan 40 milliGRAM(s) Oral every 12 hours    MEDICATIONS  (PRN):  dextrose Oral Gel 15 Gram(s) Oral once PRN Blood Glucose LESS THAN 70 milliGRAM(s)/deciliter    ASSESSMENT / RECOMMENDATIONS  Pt is a 38-year-old male hypertension, DM2, HLD, hepatitis admitted for treatment of acute hypoxic respiratory failure 2/2 hypersensitivity pneumonitis vs eosinophilic pneumonia, course complicated by cardiogenic shock 2/2 new HF, pending cardiac MRI  Endocrinology consulted for DM management    A1C: 11.6 %  BUN: 21  Creatinine: 0.92  GFR: 109  Weight: 109.7  BMI: 42.9    # Type 2 diabetes mellitus with hyperglycemia  - Please keep lantus 22 units at bedtime.   - keep lispro 11  units before each meal.  - Continue lispro moderate dose sliding scale before meals and at bedtime.  - Patient's fingerstick glucose goal is 100-180 mg/dL.    - Discharge recommendations to be discussed.   - Patient can follow up at discharge with [facility] by calling [phone] to make an appointment.

## 2024-09-22 NOTE — PROGRESS NOTE ADULT - SUBJECTIVE AND OBJECTIVE BOX
O/N Events:  Subjective/ROS: Denies HA, CP, SOB, n/v, changes in bowel/urinary habits.  12pt ROS otherwise negative.    VITALS  Vital Signs Last 24 Hrs  T(C): 36.9 (22 Sep 2024 05:45), Max: 37 (21 Sep 2024 21:05)  T(F): 98.4 (22 Sep 2024 05:45), Max: 98.6 (21 Sep 2024 21:05)  HR: 99 (22 Sep 2024 05:45) (88 - 99)  BP: 137/91 (22 Sep 2024 05:45) (127/86 - 137/91)  BP(mean): --  RR: 18 (22 Sep 2024 05:45) (18 - 18)  SpO2: 92% (22 Sep 2024 05:45) (92% - 95%)    Parameters below as of 21 Sep 2024 21:05  Patient On (Oxygen Delivery Method): room air        I&O's Summary      CAPILLARY BLOOD GLUCOSE      POCT Blood Glucose.: 178 mg/dL (22 Sep 2024 09:12)  POCT Blood Glucose.: 127 mg/dL (21 Sep 2024 22:13)  POCT Blood Glucose.: 118 mg/dL (21 Sep 2024 18:00)  POCT Blood Glucose.: 159 mg/dL (21 Sep 2024 13:34)      PHYSICAL EXAM  General: Lying comfortably and in no acute distress  HEENT: NC/AT; EOMI, anicteric sclera; MMM  Neck: supple  Cardiovascular: +S1/S2, RRR  Respiratory: CTA B/L; no W/R/R  Gastrointestinal: soft, NT/ND; +BSx4  Extremities: WWP; no clubbing or cyanosis. 1+ bilateral pitting edema  Vascular: 2+ radial pulses B/L  Neurological: AAOx3; no focal deficits  Psychiatric: pleasant mood and affect  Dermatologic: no appreciable wounds or damage to the skin      MEDICATIONS  (STANDING):  artificial  tears Solution 1 Drop(s) Both EYES four times a day  atorvastatin 40 milliGRAM(s) Oral at bedtime  chlorhexidine 2% Cloths 1 Application(s) Topical <User Schedule>  dextrose 5%. 1000 milliLiter(s) (100 mL/Hr) IV Continuous <Continuous>  dextrose 5%. 1000 milliLiter(s) (50 mL/Hr) IV Continuous <Continuous>  dextrose 50% Injectable 12.5 Gram(s) IV Push once  dextrose 50% Injectable 25 Gram(s) IV Push once  dextrose 50% Injectable 25 Gram(s) IV Push once  furosemide    Tablet 20 milliGRAM(s) Oral daily  glucagon  Injectable 1 milliGRAM(s) IntraMuscular once  heparin   Injectable 7500 Unit(s) SubCutaneous every 8 hours  insulin glargine Injectable (LANTUS) 22 Unit(s) SubCutaneous at bedtime  insulin lispro (ADMELOG) corrective regimen sliding scale   SubCutaneous at bedtime  insulin lispro (ADMELOG) corrective regimen sliding scale   SubCutaneous three times a day before meals  insulin lispro Injectable (ADMELOG) 11 Unit(s) SubCutaneous three times a day before meals  metoprolol succinate ER 25 milliGRAM(s) Oral every 24 hours  nystatin    Suspension 367515 Unit(s) Swish and Swallow every 6 hours  pantoprazole    Tablet 40 milliGRAM(s) Oral before breakfast  sodium phosphate 15 milliMole(s)/250 mL IVPB 15 milliMole(s) IV Intermittent once  spironolactone 25 milliGRAM(s) Oral daily  valsartan 80 milliGRAM(s) Oral every 12 hours    MEDICATIONS  (PRN):  dextrose Oral Gel 15 Gram(s) Oral once PRN Blood Glucose LESS THAN 70 milliGRAM(s)/deciliter      [place] (Stomach Upset)  [place] (Stomach Upset)  No Known Drug Allergies      LABS                        13.5   12.74 )-----------( 282      ( 22 Sep 2024 05:30 )             41.5     09-22    141  |  107  |  20  ----------------------------<  153[H]  4.1   |  26  |  1.10    Ca    8.9      22 Sep 2024 05:30  Phos  5.0     09-22  Mg     1.6     09-22    TPro  5.9[L]  /  Alb  3.1[L]  /  TBili  0.3  /  DBili  x   /  AST  18  /  ALT  26  /  AlkPhos  101  09-22      Urinalysis Basic - ( 22 Sep 2024 05:30 )    Color: x / Appearance: x / SG: x / pH: x  Gluc: 153 mg/dL / Ketone: x  / Bili: x / Urobili: x   Blood: x / Protein: x / Nitrite: x   Leuk Esterase: x / RBC: x / WBC x   Sq Epi: x / Non Sq Epi: x / Bacteria: x            IMAGING/EKG/ETC: reviewed

## 2024-09-22 NOTE — PROGRESS NOTE ADULT - ATTENDING COMMENTS
38-year-old male hypertension, DM2, HLD, hepatitis admitted for treatment of acute hypoxic respiratory failure 2/2 hypersensitivity pneumonitis vs eosinophilic pneumonia, course complicated by cardiogenic shock, now of pressor support, stepped down to medicine telemetry for further management of AHRF 2/2 possible hypersensitivity pneumonitis vs. eosinophilic pneumonia, currently on 2L, saturating 94-95%, now stable for RMF     Labs and imaging reviewed    Problem List  #Cardiogenic Shock  #Cardiomyopathy  #Acute HYpoxic Resp FAilure   #Eosinophilic PNA    Plan  -cMRI done, will need official read, if no sign of scarring would defer cardiac cath. Given contrast load given to patient during this admission would defer CCTA in favor of planned cardiac cath for eval     Patient would be medically ready pending etiology of cardiomyopathy from cMRI, if no sign of scarring would transition rest of work up to outpatient. No indication to perform CCTA inpatient for ischemic eval if no active scarring seen. Would also defer CCTA in favor of cardiac cath if scarring seen.

## 2024-09-23 ENCOUNTER — TRANSCRIPTION ENCOUNTER (OUTPATIENT)
Age: 39
End: 2024-09-23

## 2024-09-23 VITALS
RESPIRATION RATE: 18 BRPM | HEART RATE: 98 BPM | TEMPERATURE: 98 F | SYSTOLIC BLOOD PRESSURE: 132 MMHG | OXYGEN SATURATION: 96 % | DIASTOLIC BLOOD PRESSURE: 88 MMHG

## 2024-09-23 PROBLEM — Z78.9 OTHER SPECIFIED HEALTH STATUS: Chronic | Status: ACTIVE | Noted: 2024-09-14

## 2024-09-23 PROBLEM — I10 ESSENTIAL (PRIMARY) HYPERTENSION: Chronic | Status: ACTIVE | Noted: 2024-09-14

## 2024-09-23 PROBLEM — E78.5 HYPERLIPIDEMIA, UNSPECIFIED: Chronic | Status: ACTIVE | Noted: 2024-09-14

## 2024-09-23 PROBLEM — E11.9 TYPE 2 DIABETES MELLITUS WITHOUT COMPLICATIONS: Chronic | Status: ACTIVE | Noted: 2024-09-14

## 2024-09-23 LAB
GLUCOSE BLDC GLUCOMTR-MCNC: 119 MG/DL — HIGH (ref 70–99)
GLUCOSE BLDC GLUCOMTR-MCNC: 153 MG/DL — HIGH (ref 70–99)
GLUCOSE BLDC GLUCOMTR-MCNC: 368 MG/DL — HIGH (ref 70–99)

## 2024-09-23 PROCEDURE — 86900 BLOOD TYPING SEROLOGIC ABO: CPT

## 2024-09-23 PROCEDURE — 87385 HISTOPLASMA CAPSUL AG IA: CPT

## 2024-09-23 PROCEDURE — 80061 LIPID PANEL: CPT

## 2024-09-23 PROCEDURE — 86003 ALLG SPEC IGE CRUDE XTRC EA: CPT

## 2024-09-23 PROCEDURE — 99233 SBSQ HOSP IP/OBS HIGH 50: CPT

## 2024-09-23 PROCEDURE — 85610 PROTHROMBIN TIME: CPT

## 2024-09-23 PROCEDURE — 87207 SMEAR SPECIAL STAIN: CPT

## 2024-09-23 PROCEDURE — 80053 COMPREHEN METABOLIC PANEL: CPT

## 2024-09-23 PROCEDURE — 85730 THROMBOPLASTIN TIME PARTIAL: CPT

## 2024-09-23 PROCEDURE — 82728 ASSAY OF FERRITIN: CPT

## 2024-09-23 PROCEDURE — 36415 COLL VENOUS BLD VENIPUNCTURE: CPT

## 2024-09-23 PROCEDURE — 71250 CT THORAX DX C-: CPT | Mod: MC

## 2024-09-23 PROCEDURE — 94002 VENT MGMT INPAT INIT DAY: CPT

## 2024-09-23 PROCEDURE — 86803 HEPATITIS C AB TEST: CPT

## 2024-09-23 PROCEDURE — 86704 HEP B CORE ANTIBODY TOTAL: CPT

## 2024-09-23 PROCEDURE — 84540 ASSAY OF URINE/UREA-N: CPT

## 2024-09-23 PROCEDURE — 86706 HEP B SURFACE ANTIBODY: CPT

## 2024-09-23 PROCEDURE — 86753 PROTOZOA ANTIBODY NOS: CPT

## 2024-09-23 PROCEDURE — 86606 ASPERGILLUS ANTIBODY: CPT

## 2024-09-23 PROCEDURE — 82607 VITAMIN B-12: CPT

## 2024-09-23 PROCEDURE — 86200 CCP ANTIBODY: CPT

## 2024-09-23 PROCEDURE — 84484 ASSAY OF TROPONIN QUANT: CPT

## 2024-09-23 PROCEDURE — 83036 HEMOGLOBIN GLYCOSYLATED A1C: CPT

## 2024-09-23 PROCEDURE — 86709 HEPATITIS A IGM ANTIBODY: CPT

## 2024-09-23 PROCEDURE — 97530 THERAPEUTIC ACTIVITIES: CPT

## 2024-09-23 PROCEDURE — 83935 ASSAY OF URINE OSMOLALITY: CPT

## 2024-09-23 PROCEDURE — 83880 ASSAY OF NATRIURETIC PEPTIDE: CPT

## 2024-09-23 PROCEDURE — 86005 ALLG SPEC IGE MULTIALLG SCR: CPT

## 2024-09-23 PROCEDURE — 86850 RBC ANTIBODY SCREEN: CPT

## 2024-09-23 PROCEDURE — 83735 ASSAY OF MAGNESIUM: CPT

## 2024-09-23 PROCEDURE — 84300 ASSAY OF URINE SODIUM: CPT

## 2024-09-23 PROCEDURE — 87340 HEPATITIS B SURFACE AG IA: CPT

## 2024-09-23 PROCEDURE — 87449 NOS EACH ORGANISM AG IA: CPT

## 2024-09-23 PROCEDURE — 86001 ALLERGEN SPECIFIC IGG: CPT

## 2024-09-23 PROCEDURE — 82962 GLUCOSE BLOOD TEST: CPT

## 2024-09-23 PROCEDURE — 86225 DNA ANTIBODY NATIVE: CPT

## 2024-09-23 PROCEDURE — 86235 NUCLEAR ANTIGEN ANTIBODY: CPT

## 2024-09-23 PROCEDURE — 84100 ASSAY OF PHOSPHORUS: CPT

## 2024-09-23 PROCEDURE — 83550 IRON BINDING TEST: CPT

## 2024-09-23 PROCEDURE — A9585: CPT

## 2024-09-23 PROCEDURE — 82550 ASSAY OF CK (CPK): CPT

## 2024-09-23 PROCEDURE — 0225U NFCT DS DNA&RNA 21 SARSCOV2: CPT

## 2024-09-23 PROCEDURE — 99239 HOSP IP/OBS DSCHRG MGMT >30: CPT

## 2024-09-23 PROCEDURE — 71045 X-RAY EXAM CHEST 1 VIEW: CPT

## 2024-09-23 PROCEDURE — 82085 ASSAY OF ALDOLASE: CPT

## 2024-09-23 PROCEDURE — 86658 ENTEROVIRUS ANTIBODY: CPT

## 2024-09-23 PROCEDURE — 97161 PT EVAL LOW COMPLEX 20 MIN: CPT

## 2024-09-23 PROCEDURE — 87389 HIV-1 AG W/HIV-1&-2 AB AG IA: CPT

## 2024-09-23 PROCEDURE — 86038 ANTINUCLEAR ANTIBODIES: CPT

## 2024-09-23 PROCEDURE — 86431 RHEUMATOID FACTOR QUANT: CPT

## 2024-09-23 PROCEDURE — 84443 ASSAY THYROID STIM HORMONE: CPT

## 2024-09-23 PROCEDURE — 87899 AGENT NOS ASSAY W/OPTIC: CPT

## 2024-09-23 PROCEDURE — 85045 AUTOMATED RETICULOCYTE COUNT: CPT

## 2024-09-23 PROCEDURE — 83540 ASSAY OF IRON: CPT

## 2024-09-23 PROCEDURE — 82785 ASSAY OF IGE: CPT

## 2024-09-23 PROCEDURE — 83516 IMMUNOASSAY NONANTIBODY: CPT

## 2024-09-23 PROCEDURE — 82570 ASSAY OF URINE CREATININE: CPT

## 2024-09-23 PROCEDURE — 82803 BLOOD GASES ANY COMBINATION: CPT

## 2024-09-23 PROCEDURE — 80048 BASIC METABOLIC PNL TOTAL CA: CPT

## 2024-09-23 PROCEDURE — 86705 HEP B CORE ANTIBODY IGM: CPT

## 2024-09-23 PROCEDURE — 86682 HELMINTH ANTIBODY: CPT

## 2024-09-23 PROCEDURE — 85025 COMPLETE CBC W/AUTO DIFF WBC: CPT

## 2024-09-23 PROCEDURE — 86036 ANCA SCREEN EACH ANTIBODY: CPT

## 2024-09-23 PROCEDURE — 84145 PROCALCITONIN (PCT): CPT

## 2024-09-23 PROCEDURE — 82553 CREATINE MB FRACTION: CPT

## 2024-09-23 PROCEDURE — 86635 COCCIDIOIDES ANTIBODY: CPT

## 2024-09-23 PROCEDURE — C8929: CPT

## 2024-09-23 PROCEDURE — 94003 VENT MGMT INPAT SUBQ DAY: CPT

## 2024-09-23 PROCEDURE — 97116 GAIT TRAINING THERAPY: CPT

## 2024-09-23 PROCEDURE — 86331 IMMUNODIFFUSION OUCHTERLONY: CPT

## 2024-09-23 PROCEDURE — 82746 ASSAY OF FOLIC ACID SERUM: CPT

## 2024-09-23 PROCEDURE — 84681 ASSAY OF C-PEPTIDE: CPT

## 2024-09-23 PROCEDURE — 75561 CARDIAC MRI FOR MORPH W/DYE: CPT

## 2024-09-23 PROCEDURE — 86901 BLOOD TYPING SEROLOGIC RH(D): CPT

## 2024-09-23 PROCEDURE — 83605 ASSAY OF LACTIC ACID: CPT

## 2024-09-23 RX ORDER — INSULIN ASPART INJECTION 100 [IU]/ML
11 INJECTION, SOLUTION SUBCUTANEOUS
Qty: 1 | Refills: 3
Start: 2024-09-23 | End: 2025-01-20

## 2024-09-23 RX ORDER — INSULIN DETEMIR 100 [IU]/ML
22 INJECTION, SOLUTION SUBCUTANEOUS
Qty: 1 | Refills: 0
Start: 2024-09-23 | End: 2024-10-22

## 2024-09-23 RX ORDER — SPIRONOLACTONE 100 MG/1
1 TABLET, FILM COATED ORAL
Qty: 30 | Refills: 3
Start: 2024-09-23 | End: 2025-01-20

## 2024-09-23 RX ORDER — PREDNISONE 5 MG/1
5 TABLET ORAL ONCE
Refills: 0 | Status: COMPLETED | OUTPATIENT
Start: 2024-09-23 | End: 2024-09-23

## 2024-09-23 RX ORDER — BENZOCAINE .06; .7 ML/1; ML/1
0 SWAB TOPICAL
Qty: 100 | Refills: 1
Start: 2024-09-23

## 2024-09-23 RX ORDER — METFORMIN HCL 500 MG
1 TABLET ORAL
Refills: 0 | DISCHARGE

## 2024-09-23 RX ORDER — ATORVASTATIN CALCIUM 10 MG/1
1 TABLET, FILM COATED ORAL
Qty: 30 | Refills: 3
Start: 2024-09-23 | End: 2025-01-20

## 2024-09-23 RX ORDER — FUROSEMIDE 10 MG/ML
1 INJECTION INTRAVENOUS
Qty: 14 | Refills: 0
Start: 2024-09-23 | End: 2024-10-06

## 2024-09-23 RX ORDER — CARVEDILOL 3.125 MG
1 TABLET ORAL
Qty: 60 | Refills: 3
Start: 2024-09-23 | End: 2025-01-20

## 2024-09-23 RX ORDER — VALSARTAN 320 MG/1
1 TABLET ORAL
Qty: 30 | Refills: 3
Start: 2024-09-23 | End: 2025-01-20

## 2024-09-23 RX ORDER — HYDROCHLOROTHIAZIDE 50 MG/1
1 TABLET ORAL
Refills: 0 | DISCHARGE

## 2024-09-23 RX ORDER — CARVEDILOL 3.125 MG
6.25 TABLET ORAL EVERY 12 HOURS
Refills: 0 | Status: DISCONTINUED | OUTPATIENT
Start: 2024-09-24 | End: 2024-09-23

## 2024-09-23 RX ORDER — INSULIN DEGLUDEC 100 U/ML
22 INJECTION, SOLUTION SUBCUTANEOUS
Qty: 1 | Refills: 0
Start: 2024-09-23 | End: 2024-10-22

## 2024-09-23 RX ADMIN — Medication 1 DROP(S): at 06:05

## 2024-09-23 RX ADMIN — Medication 11 UNIT(S): at 18:09

## 2024-09-23 RX ADMIN — PANTOPRAZOLE SODIUM 40 MILLIGRAM(S): 40 TABLET, DELAYED RELEASE ORAL at 06:37

## 2024-09-23 RX ADMIN — Medication 10: at 18:08

## 2024-09-23 RX ADMIN — Medication 1 DROP(S): at 18:09

## 2024-09-23 RX ADMIN — Medication 500000 UNIT(S): at 06:03

## 2024-09-23 RX ADMIN — Medication 2: at 09:47

## 2024-09-23 RX ADMIN — Medication 1 DROP(S): at 12:11

## 2024-09-23 RX ADMIN — Medication 7500 UNIT(S): at 13:32

## 2024-09-23 RX ADMIN — Medication 500000 UNIT(S): at 12:43

## 2024-09-23 RX ADMIN — Medication 1 DROP(S): at 00:13

## 2024-09-23 RX ADMIN — VALSARTAN 80 MILLIGRAM(S): 320 TABLET ORAL at 09:46

## 2024-09-23 RX ADMIN — Medication 500000 UNIT(S): at 00:13

## 2024-09-23 RX ADMIN — Medication 11 UNIT(S): at 09:47

## 2024-09-23 RX ADMIN — Medication 7500 UNIT(S): at 06:04

## 2024-09-23 RX ADMIN — SPIRONOLACTONE 25 MILLIGRAM(S): 100 TABLET, FILM COATED ORAL at 06:02

## 2024-09-23 RX ADMIN — Medication 25 MILLIGRAM(S): at 06:38

## 2024-09-23 RX ADMIN — PREDNISONE 5 MILLIGRAM(S): 5 TABLET ORAL at 17:09

## 2024-09-23 RX ADMIN — FUROSEMIDE 20 MILLIGRAM(S): 10 INJECTION INTRAVENOUS at 06:02

## 2024-09-23 NOTE — PROGRESS NOTE ADULT - TIME BILLING
Bedside exam and interview   Reviewed vitals, labs   Discussed patient's plan of care with housestaff   Documentation of encounter    Time documented on encounter excludes teaching and separately reported services
As above
As above
case complexity
As above
As above
case complexity
Bedside exam and interview   Reviewed vitals, labs   Discussed patient's plan of care with housestaff   Documentation of encounter    Time documented on encounter excludes teaching and separately reported services
Time-based billing (NON-critical care).     More than 50% of the visit was spent counseling and / or coordinating care by the attending physician.      The necessity of the time spent during the encounter on this date of service was due to: documentation in [place], reviewing chart and coordinating care with patient/resident and interdisciplinary staff (such as , social workers, etc) as well as reviewing vitals, laboratory data, radiology, medication list, consultants' recommendations and prior records. Interventions were performed as documented above.
Bedside exam and interview   Reviewed vitals, labs   Discussed patient's plan of care with house staff   Documentation of encounter  Excludes teaching time and/or separately reported services
Bedside exam and interview   Reviewed vitals, labs   Discussed patient's plan of care with housestaff   Documentation of encounter  Excludes teaching time and/or separately reported services

## 2024-09-23 NOTE — PROGRESS NOTE ADULT - PROBLEM SELECTOR PLAN 6
#HLD  Per chart review, on a statin (unknown which one) but not taking past 2 months    Plan:  - c/w lipitor 40mg qhs

## 2024-09-23 NOTE — PROGRESS NOTE ADULT - ATTENDING COMMENTS
The patient hadc a cardiac MRI and no surgery is planned. Glucose levels 115-120 last night and [id number]/ I agree with discharge on 22 units Lantus Insulin and 11 units Novolog Insulin pre-meals

## 2024-09-23 NOTE — PROGRESS NOTE ADULT - PROBLEM SELECTOR PLAN 5
Poorly controlled   A1c 12% during [place] Admission   Per chart review, Home metformin 1000mg BID but has not taken for past 2 months    Plan:  - discontinue insulin gtt, no indication for DKA  - c/w hISS  - c/w basal insulin (26U Lantus bedtime)  - c/w lispro 11U premeal  - FSG q4hrs
Poorly controlled   A1c 12% during [place] Admission   Per chart review, Home metformin 1000mg BID but has not taken for past 2 months    Plan:  - discontinue insulin gtt, no indication for DKA  - c/w hISS  - c/w basal insulin (26U Lantus bedtime)  - c/w lispro 11U premeal  - FSG q4hrs
Poorly controlled   A1c 12% during [place] Admission   Per chart review, Home metformin 1000mg BID but has not taken for past 2 months    Plan:  - discontinue insulin gtt, no indication for DKA  - c/w hISS  - c/w basal insulin (26U Lantus bedtime)  - c/w lispro 11U premeal  - FSG q4hrs
Poorly controlled   A1c 12% during [place] Admission   Per chart review, Home metformin 1000mg BID but has not taken for past 2 months    Plan:  - discontinue insulin gtt, no indication for DKA  - c/w hISS  - c/w basal insulin (26U Lantus bedtime)  - c/w lispro 11U premeal  - FSG q4hrs

## 2024-09-23 NOTE — PROGRESS NOTE ADULT - SUBJECTIVE AND OBJECTIVE BOX
OVERNIGHT EVENTS:    SUBJECTIVE / INTERVAL HPI: Patient seen and examined at bedside.       PHYSICAL EXAM:    General: Lying comfortably and in no acute distress  HEENT: NC/AT; EOMI, anicteric sclera; MMM  Neck: supple  Cardiovascular: +S1/S2, RRR  Respiratory: CTA B/L; no W/R/R  Gastrointestinal: soft, NT/ND; +BSx4  Extremities: WWP; no edema, clubbing or cyanosis  Vascular: 2+ radial pulses B/L  Neurological: AAOx3; no focal deficits  Psychiatric: pleasant mood and affect  Dermatologic: no appreciable wounds or damage to the skin    VITAL SIGNS:  Vital Signs Last 24 Hrs  T(C): 36.5 (23 Sep 2024 05:43), Max: 36.8 (22 Sep 2024 15:31)  T(F): 97.7 (23 Sep 2024 05:43), Max: 98.2 (22 Sep 2024 15:31)  HR: 101 (23 Sep 2024 05:43) (91 - 101)  BP: 148/94 (23 Sep 2024 05:43) (127/88 - 148/94)  BP(mean): --  RR: 16 (23 Sep 2024 05:43) (16 - 18)  SpO2: 94% (23 Sep 2024 05:43) (93% - 94%)    Parameters below as of 23 Sep 2024 05:43  Patient On (Oxygen Delivery Method): room air          MEDICATIONS:  MEDICATIONS  (STANDING):  artificial  tears Solution 1 Drop(s) Both EYES four times a day  atorvastatin 40 milliGRAM(s) Oral at bedtime  chlorhexidine 2% Cloths 1 Application(s) Topical <User Schedule>  dextrose 5%. 1000 milliLiter(s) (100 mL/Hr) IV Continuous <Continuous>  dextrose 5%. 1000 milliLiter(s) (50 mL/Hr) IV Continuous <Continuous>  dextrose 50% Injectable 12.5 Gram(s) IV Push once  dextrose 50% Injectable 25 Gram(s) IV Push once  dextrose 50% Injectable 25 Gram(s) IV Push once  furosemide    Tablet 20 milliGRAM(s) Oral daily  glucagon  Injectable 1 milliGRAM(s) IntraMuscular once  heparin   Injectable 7500 Unit(s) SubCutaneous every 8 hours  insulin glargine Injectable (LANTUS) 22 Unit(s) SubCutaneous at bedtime  insulin lispro (ADMELOG) corrective regimen sliding scale   SubCutaneous three times a day before meals  insulin lispro (ADMELOG) corrective regimen sliding scale   SubCutaneous at bedtime  insulin lispro Injectable (ADMELOG) 11 Unit(s) SubCutaneous three times a day before meals  metoprolol succinate ER 25 milliGRAM(s) Oral every 24 hours  nystatin    Suspension 612053 Unit(s) Swish and Swallow every 6 hours  pantoprazole    Tablet 40 milliGRAM(s) Oral before breakfast  sodium phosphate 15 milliMole(s)/250 mL IVPB 15 milliMole(s) IV Intermittent once  spironolactone 25 milliGRAM(s) Oral daily  valsartan 80 milliGRAM(s) Oral every 12 hours    MEDICATIONS  (PRN):  dextrose Oral Gel 15 Gram(s) Oral once PRN Blood Glucose LESS THAN 70 milliGRAM(s)/deciliter      ALLERGIES:  Allergies    [place] (Stomach Upset)  [place] (Stomach Upset)  No Known Drug Allergies    Intolerances        LABS:                        13.5   12.74 )-----------( 282      ( 22 Sep 2024 05:30 )             41.5     09-22    141  |  107  |  20  ----------------------------<  153[H]  4.1   |  26  |  1.10    Ca    8.9      22 Sep 2024 05:30  Phos  5.0     09-22  Mg     1.6     09-22    TPro  5.9[L]  /  Alb  3.1[L]  /  TBili  0.3  /  DBili  x   /  AST  18  /  ALT  26  /  AlkPhos  101  09-22      Urinalysis Basic - ( 22 Sep 2024 05:30 )    Color: x / Appearance: x / SG: x / pH: x  Gluc: 153 mg/dL / Ketone: x  / Bili: x / Urobili: x   Blood: x / Protein: x / Nitrite: x   Leuk Esterase: x / RBC: x / WBC x   Sq Epi: x / Non Sq Epi: x / Bacteria: x      CAPILLARY BLOOD GLUCOSE      POCT Blood Glucose.: 120 mg/dL (22 Sep 2024 22:04)      RADIOLOGY & ADDITIONAL TESTS: Reviewed. OVERNIGHT EVENTS: STEFFI    SUBJECTIVE / INTERVAL HPI: Patient seen and examined at bedside. Pt denies any new complaints including SOB, PERRY, CP, or cough. Pt reports he does not follow with a PCP, cardiologist, or pulmonologist, but can coordinate care in his hometown ([place]) upon discharge.     PHYSICAL EXAM:    General: Lying comfortably and in no acute distress  HEENT: NC/AT; EOMI, anicteric sclera; MMM  Neck: supple  Cardiovascular: +S1/S2, RRR  Respiratory: CTA B/L; no W/R/R  Gastrointestinal: soft, NT/ND; +BSx4  Extremities: WWP; no clubbing or cyanosis. Mild bilateral pitting edema  Vascular: 2+ radial pulses B/L  Neurological: AAOx3; no focal deficits  Psychiatric: pleasant mood and affect  Dermatologic: no appreciable wounds or damage to the skin    VITAL SIGNS:  Vital Signs Last 24 Hrs  T(C): 36.5 (23 Sep 2024 05:43), Max: 36.8 (22 Sep 2024 15:31)  T(F): 97.7 (23 Sep 2024 05:43), Max: 98.2 (22 Sep 2024 15:31)  HR: 101 (23 Sep 2024 05:43) (91 - 101)  BP: 148/94 (23 Sep 2024 05:43) (127/88 - 148/94)  BP(mean): --  RR: 16 (23 Sep 2024 05:43) (16 - 18)  SpO2: 94% (23 Sep 2024 05:43) (93% - 94%)    Parameters below as of 23 Sep 2024 05:43  Patient On (Oxygen Delivery Method): room air          MEDICATIONS:  MEDICATIONS  (STANDING):  artificial  tears Solution 1 Drop(s) Both EYES four times a day  atorvastatin 40 milliGRAM(s) Oral at bedtime  chlorhexidine 2% Cloths 1 Application(s) Topical <User Schedule>  dextrose 5%. 1000 milliLiter(s) (100 mL/Hr) IV Continuous <Continuous>  dextrose 5%. 1000 milliLiter(s) (50 mL/Hr) IV Continuous <Continuous>  dextrose 50% Injectable 12.5 Gram(s) IV Push once  dextrose 50% Injectable 25 Gram(s) IV Push once  dextrose 50% Injectable 25 Gram(s) IV Push once  furosemide    Tablet 20 milliGRAM(s) Oral daily  glucagon  Injectable 1 milliGRAM(s) IntraMuscular once  heparin   Injectable 7500 Unit(s) SubCutaneous every 8 hours  insulin glargine Injectable (LANTUS) 22 Unit(s) SubCutaneous at bedtime  insulin lispro (ADMELOG) corrective regimen sliding scale   SubCutaneous three times a day before meals  insulin lispro (ADMELOG) corrective regimen sliding scale   SubCutaneous at bedtime  insulin lispro Injectable (ADMELOG) 11 Unit(s) SubCutaneous three times a day before meals  metoprolol succinate ER 25 milliGRAM(s) Oral every 24 hours  nystatin    Suspension 203001 Unit(s) Swish and Swallow every 6 hours  pantoprazole    Tablet 40 milliGRAM(s) Oral before breakfast  sodium phosphate 15 milliMole(s)/250 mL IVPB 15 milliMole(s) IV Intermittent once  spironolactone 25 milliGRAM(s) Oral daily  valsartan 80 milliGRAM(s) Oral every 12 hours    MEDICATIONS  (PRN):  dextrose Oral Gel 15 Gram(s) Oral once PRN Blood Glucose LESS THAN 70 milliGRAM(s)/deciliter      ALLERGIES:  Allergies    [place] (Stomach Upset)  [place] (Stomach Upset)  No Known Drug Allergies    Intolerances        LABS:                        13.5   12.74 )-----------( 282      ( 22 Sep 2024 05:30 )             41.5     09-22    141  |  107  |  20  ----------------------------<  153[H]  4.1   |  26  |  1.10    Ca    8.9      22 Sep 2024 05:30  Phos  5.0     09-22  Mg     1.6     09-22    TPro  5.9[L]  /  Alb  3.1[L]  /  TBili  0.3  /  DBili  x   /  AST  18  /  ALT  26  /  AlkPhos  101  09-22      Urinalysis Basic - ( 22 Sep 2024 05:30 )    Color: x / Appearance: x / SG: x / pH: x  Gluc: 153 mg/dL / Ketone: x  / Bili: x / Urobili: x   Blood: x / Protein: x / Nitrite: x   Leuk Esterase: x / RBC: x / WBC x   Sq Epi: x / Non Sq Epi: x / Bacteria: x      CAPILLARY BLOOD GLUCOSE      POCT Blood Glucose.: 120 mg/dL (22 Sep 2024 22:04)      RADIOLOGY & ADDITIONAL TESTS: Reviewed.

## 2024-09-23 NOTE — PROGRESS NOTE ADULT - ATTENDING SUPERVISION STATEMENT
Fellow
Resident
Fellow
Resident
Fellow
Resident

## 2024-09-23 NOTE — DISCHARGE NOTE NURSING/CASE MANAGEMENT/SOCIAL WORK - PATIENT PORTAL LINK FT
You can access the FollowMyHealth Patient Portal offered by [facility] by registering at the following website: [email or web address]. By joining [email or web address] FollowMyHealth portal, you will also be able to view your health information using other applications (apps) compatible with our system.

## 2024-09-23 NOTE — PROGRESS NOTE ADULT - PROBLEM SELECTOR PLAN 1
Possibly 2/2 hypersensitivity pneumonitis vs eosinophilic pneumonia -- etiology is unclear with severe PNA and ARDS. Newly reduced EF with cardiogenic shock at [place] suggesting flash pulmonary edema as possible cause   Initially presented to [place] hospital on 9/10 for SOB, PERRY, cough x 2 weeks. Found to have increasing O2 requirements initially placed on NC > HFNC > BiPAP, eventually requiring intubation  CXR with multi-focal pneumonia.   Pt improved rapidly after treatment, which was highly concerning for hypersensitivity pneumonitis vs eosinophilic pneumonia  Patient was treated with CTX 9/10-9/14, Azithro 9/10-9/12, Bactrim 9/11-9/12, iv solumedrol 9/11-9/14 at OSH.   s/p Bronchoscopy 9/13; s/p zosyn 7d course (received half of it at [place])  Repeat CT- chest with improved bilateral infiltrates  Fungitell, autoimmune workup negative   Coxsackie A and B titers positive    Plan:  - OOB, IS  - prednisone 20 mg today, then taper down to 10mg on 9/22, then 5mg on 9/23  - wean oxygen as tolerated  - Needs pulmonology outpatient follow up in [place]  - c/w PT Possibly 2/2 hypersensitivity pneumonitis vs eosinophilic pneumonia -- etiology is unclear with severe PNA and ARDS. Newly reduced EF with cardiogenic shock at [place] suggesting flash pulmonary edema as possible cause   Initially presented to [facility] hospital on 9/10 for SOB, PERRY, cough x 2 weeks. Found to have increasing O2 requirements initially placed on NC > HFNC > BiPAP, eventually requiring intubation  CXR with multi-focal pneumonia.   Pt improved rapidly after treatment, which was highly concerning for hypersensitivity pneumonitis vs eosinophilic pneumonia  Patient was treated with CTX 9/10-9/14, Azithro 9/10-9/12, Bactrim 9/11-9/12, iv solumedrol 9/11-9/14 at OSH.   s/p Bronchoscopy 9/13; s/p zosyn 7d course (received half of it at [place])  Repeat CT- chest with improved bilateral infiltrates  Fungitell, autoimmune workup negative   Coxsackie A and B titers positive    Plan:  - OOB, IS  - prednisone taper to 5mg today  - wean oxygen as tolerated  - Needs pulmonology outpatient follow up in [place]  - c/w PT

## 2024-09-23 NOTE — PROGRESS NOTE ADULT - SUBJECTIVE AND OBJECTIVE BOX
PULMONARY CONSULT SERVICE FOLLOW-UP NOTE    INTERVAL HPI:  Reviewed chart and overnight events; patient seen and examined. Reports feeling well and on room air. ambulated w/o oxygen few days prior reportedly never dropped below 90%. He feels great w/o complaints.    MEDICATIONS:  Pulmonary:    Antimicrobials:  nystatin    Suspension 508219 Unit(s) Swish and Swallow every 6 hours    Anticoagulants:  heparin   Injectable 7500 Unit(s) SubCutaneous every 8 hours    Cardiac:  furosemide    Tablet 20 milliGRAM(s) Oral daily  metoprolol succinate ER 25 milliGRAM(s) Oral every 24 hours  spironolactone 25 milliGRAM(s) Oral daily  valsartan 80 milliGRAM(s) Oral every 12 hours      Allergies    [place] (Stomach Upset)  [place] (Stomach Upset)  No Known Drug Allergies    Intolerances        Vital Signs Last 24 Hrs  T(C): 36.7 (23 Sep 2024 09:02), Max: 36.8 (22 Sep 2024 15:31)  T(F): 98 (23 Sep 2024 09:02), Max: 98.2 (22 Sep 2024 15:31)  HR: 90 (23 Sep 2024 09:02) (90 - 101)  BP: 123/83 (23 Sep 2024 09:02) (123/83 - 148/94)  BP(mean): --  RR: 16 (23 Sep 2024 09:02) (16 - 17)  SpO2: 93% (23 Sep 2024 09:02) (93% - 94%)    Parameters below as of 23 Sep 2024 09:02  Patient On (Oxygen Delivery Method): room air            PHYSICAL EXAM:  Constitutional: well-appearing, in no apparent respiratory distress  HEENT: NC/AT; PERRL, anicteric sclera; moist mucous membranes  Neck: supple, no JVD or LAD appreciated  Cardiovascular: +S1/S2, Regular rate and rhythm, no murmurs appreciated   Respiratory: Clear breath sounds bilaterally with faint crackles in the right posterior base. No wheezes, rhonchi or rales   Gastrointestinal: soft, non-tender, non-distended. Normoactive bowel sounds.   Extremities: no edema, clubbing or cyanosis  Vascular: 2+ radial pulses B/L  Neurological: awake and alert; oriented x3    LABS:      CBC Full  -  ( 22 Sep 2024 05:30 )  WBC Count : 12.74 K/uL  RBC Count : 4.94 M/uL  Hemoglobin : 13.5 g/dL  Hematocrit : 41.5 %  Platelet Count - Automated : 282 K/uL  Mean Cell Volume : 84.0 fl  Mean Cell Hemoglobin : 27.3 pg  Mean Cell Hemoglobin Concentration : 32.5 gm/dL  Auto Neutrophil # : 7.93 K/uL  Auto Lymphocyte # : 2.82 K/uL  Auto Monocyte # : 1.09 K/uL  Auto Eosinophil # : 0.14 K/uL  Auto Basophil # : 0.06 K/uL  Auto Neutrophil % : 62.2 %  Auto Lymphocyte % : 22.1 %  Auto Monocyte % : 8.6 %  Auto Eosinophil % : 1.1 %  Auto Basophil % : 0.5 %    09-22    141  |  107  |  20  ----------------------------<  153[H]  4.1   |  26  |  1.10    Ca    8.9      22 Sep 2024 05:30  Phos  5.0     09-22  Mg     1.6     09-22    TPro  5.9[L]  /  Alb  3.1[L]  /  TBili  0.3  /  DBili  x   /  AST  18  /  ALT  26  /  AlkPhos  101  09-22                    RADIOLOGY & ADDITIONAL STUDIES:    cardiac mri pending

## 2024-09-23 NOTE — PROGRESS NOTE ADULT - ATTENDING COMMENTS
Patient is a 39-yo old male with Pmhx of Hypertension, DM2, HLD, Etoh use, non compliant with Medical therapy, transferred from [facility], for management of acute hypoxic respiratory failure with concern for Eosinophilic pneumonitis, found to be in Shock, with ATN and severe LV dysfunction for which cardiology was originally consulted     Review of Studies:  - Cardiac MRI 09/20/2024: The left ventricle (LV) is dilated. There is no evidence of LV hypertrophy . Left ventricular global systolic function is reduced. There is global hypokinesis. The LV ejection fraction is 43 %. The right ventricle (RV) is normal in size. RV global systolic function is normal. The RV ejection fraction is 61 %. Mild mitral regurgitation. No significant abnormalities of the visualized portions of the great vessels. On delayed enhancement imaging,  there is mid myocardia  l hyperenhancement of the mid fabrice septum and mid infero septum in a non ischemic pattern. This can be seen in an infectious or inflammatory process.  - TTE 09/16/2024: Dilated LV, EF 40% Global Hypokinesis; Mild inferior wal hypokinesis; LA borderline dilated; Mildly dilated RA  - TTE 9/12/24: Left ventricular systolic function is severely decreased with an ejection fraction of 25 % by Valle's method of disks. Global left ventricular hypokinesis. The right ventricle is not well visualized, probably normal right ventricular systolic function. The cardiac valves are not well seen except for the aortic valve which appears normal. No pericardial effusion seen.    # Systolic Heart Failure, likely Acute on Chronic  - Patient has known prior Hx of HTN and DM, previously on Lisinopril. It appears he has not taken his medications '' for a while'' after his Rx ran out  - Patient works in the [employer] industry and as such reports consuming Alchohol on the daily, at times tasting over a dozen drinks a day. He denies history of DTs or ETOH intoxication requiring hospitalization. He denies hx of Drug use or family Hx of Heart Failure  - In the past year he has noticed progressive worsening shortness of breath, and weight gain. When prompted further about symptoms and history patient is not entirely forthcoming or direct about his symptoms, time of onset and progression etc  - Echo reviewed. EF better visualized on Definity pictures. LV function is mildly reduced around 40%. The LV is dilated. There is borderline biatrial enlargement and the IVC is dilated consistent with elevated RAP.   - Cardiac MRI revealed dilated LV with EF around 43% with RWMA suggestive of Myocarditis  - Clinically patient is warm, well perfused and compensated. He has been weaned off oxygen  - ATN has resolved with continued improvement in UO and renal function  - At this time Etiology of Cardiomyopathy likely Non ischemic in nature with MRI findings suggestive of Possible Myocarditis with suspicion for Etoh use effect on LV  - As patient not insured with High Co pay for Entresto will maintain on Valsartan  -In term of GDMT, would DC on Valsartan 160 mg po Qd  - To aid in BP control would switch Lopressor to Coreg 6.25 mg po BID  - Would maintain on spironolactone 25 mg po daily and Lasix 20 mg po daily (for 2 more weeks)  - Endocrine following for A1c of 12.   - TSH WNL. Patient with Marked Hypertriglyceridemia and HLD with LDL of 120. Given concurrent DM, cont Lipitor 40 mg po Qd  - Patient will need close outpatient cardiology follow up. Extensive conversation had about medication compliance and importance of follow up. Patient will also need genetic counseling as an outpatient  - Please ensure close outpatient cardiology follow up with Dr Nunez from HF and Dr Aba Newman (Cardiology) upon DC.

## 2024-09-23 NOTE — PROGRESS NOTE ADULT - PROBLEM SELECTOR PROBLEM 4
DIANDRA (acute kidney injury)

## 2024-09-23 NOTE — PROGRESS NOTE ADULT - SUBJECTIVE AND OBJECTIVE BOX
INTERVAL EVENTS:    PAST MEDICAL & SURGICAL HISTORY:  Diabetes    HTN (hypertension)    HLD (hyperlipidemia)    Alcohol use        MEDICATIONS  (STANDING):  artificial  tears Solution 1 Drop(s) Both EYES four times a day  atorvastatin 40 milliGRAM(s) Oral at bedtime  chlorhexidine 2% Cloths 1 Application(s) Topical <User Schedule>  dextrose 5%. 1000 milliLiter(s) (100 mL/Hr) IV Continuous <Continuous>  dextrose 5%. 1000 milliLiter(s) (50 mL/Hr) IV Continuous <Continuous>  dextrose 50% Injectable 12.5 Gram(s) IV Push once  dextrose 50% Injectable 25 Gram(s) IV Push once  dextrose 50% Injectable 25 Gram(s) IV Push once  furosemide    Tablet 20 milliGRAM(s) Oral daily  glucagon  Injectable 1 milliGRAM(s) IntraMuscular once  heparin   Injectable 7500 Unit(s) SubCutaneous every 8 hours  insulin glargine Injectable (LANTUS) 22 Unit(s) SubCutaneous at bedtime  insulin lispro (ADMELOG) corrective regimen sliding scale   SubCutaneous at bedtime  insulin lispro (ADMELOG) corrective regimen sliding scale   SubCutaneous three times a day before meals  insulin lispro Injectable (ADMELOG) 11 Unit(s) SubCutaneous three times a day before meals  metoprolol succinate ER 25 milliGRAM(s) Oral every 24 hours  nystatin    Suspension 850255 Unit(s) Swish and Swallow every 6 hours  pantoprazole    Tablet 40 milliGRAM(s) Oral before breakfast  predniSONE   Tablet 5 milliGRAM(s) Oral once  sodium phosphate 15 milliMole(s)/250 mL IVPB 15 milliMole(s) IV Intermittent once  spironolactone 25 milliGRAM(s) Oral daily  valsartan 80 milliGRAM(s) Oral every 12 hours    MEDICATIONS  (PRN):  dextrose Oral Gel 15 Gram(s) Oral once PRN Blood Glucose LESS THAN 70 milliGRAM(s)/deciliter    T(F): 98 (09-23-24 @ 09:02), Max: 98.2 (09-22-24 @ 15:31)  HR: 90 (09-23-24 @ 09:02) (90 - 101)  BP: 123/83 (09-23-24 @ 09:02) (123/83 - 148/94)  BP(mean): --  ABP: --  ABP(mean): --  RR: 16 (09-23-24 @ 09:02) (16 - 17)  SpO2: 93% (09-23-24 @ 09:02) (93% - 94%)  CVP(mm Hg): --    I/O Detail 24H      PHYSICAL EXAM:  GEN: NAD  HEENT: EOMI   RESP: CTA b/l  CV: RRR. Normal S1/S2. No m/r/g.  ABD: soft, non-distended  EXT: No edema   NEURO: alert and attentive    LABS:  CBC 09-22-24 @ 05:30                        13.5   12.74 )-----------( 282                   41.5     Hgb trend: 13.5 <-- , 13.9 <--   WBC trend: 12.74 <-- , 13.10 <--       CMP 09-22-24 @ 05:30    141  |  107  |  20  ----------------------------<  153[H]  4.1   |  26  |  1.10    Phos  5.0     09-22  Mg     1.6     09-22    TPro  5.9[L]  /  Alb  3.1[L]  /  TBili  0.3  /  DBili  x   /  AST  18  /  ALT  26  /  AlkPhos  101     09-22    Serum Cr (eGFR) trend: 1.10 (88) <-- , 1.14 (84) <--           Cardiac Markers         STUDIES:           INTERVAL EVENTS: Seen & examined at bedside this morning. Feeling well, no complaint of shortness of breath, chest pain, palpitations. ROS otherwise negative. Discussed plan for outpatient medications and follow up.     PAST MEDICAL & SURGICAL HISTORY:  Diabetes  HTN (hypertension)  HLD (hyperlipidemia)  Alcohol use    MEDICATIONS  (STANDING):  artificial  tears Solution 1 Drop(s) Both EYES four times a day  atorvastatin 40 milliGRAM(s) Oral at bedtime  chlorhexidine 2% Cloths 1 Application(s) Topical <User Schedule>  dextrose 5%. 1000 milliLiter(s) (100 mL/Hr) IV Continuous <Continuous>  dextrose 5%. 1000 milliLiter(s) (50 mL/Hr) IV Continuous <Continuous>  dextrose 50% Injectable 12.5 Gram(s) IV Push once  dextrose 50% Injectable 25 Gram(s) IV Push once  dextrose 50% Injectable 25 Gram(s) IV Push once  furosemide    Tablet 20 milliGRAM(s) Oral daily  glucagon  Injectable 1 milliGRAM(s) IntraMuscular once  heparin   Injectable 7500 Unit(s) SubCutaneous every 8 hours  insulin glargine Injectable (LANTUS) 22 Unit(s) SubCutaneous at bedtime  insulin lispro (ADMELOG) corrective regimen sliding scale   SubCutaneous at bedtime  insulin lispro (ADMELOG) corrective regimen sliding scale   SubCutaneous three times a day before meals  insulin lispro Injectable (ADMELOG) 11 Unit(s) SubCutaneous three times a day before meals  metoprolol succinate ER 25 milliGRAM(s) Oral every 24 hours  nystatin    Suspension 440289 Unit(s) Swish and Swallow every 6 hours  pantoprazole    Tablet 40 milliGRAM(s) Oral before breakfast  predniSONE   Tablet 5 milliGRAM(s) Oral once  sodium phosphate 15 milliMole(s)/250 mL IVPB 15 milliMole(s) IV Intermittent once  spironolactone 25 milliGRAM(s) Oral daily  valsartan 80 milliGRAM(s) Oral every 12 hours    MEDICATIONS  (PRN):  dextrose Oral Gel 15 Gram(s) Oral once PRN Blood Glucose LESS THAN 70 milliGRAM(s)/deciliter    T(F): 98 (09-23-24 @ 09:02), Max: 98.2 (09-22-24 @ 15:31)  HR: 90 (09-23-24 @ 09:02) (90 - 101)  BP: 123/83 (09-23-24 @ 09:02) (123/83 - 148/94)  BP(mean): --  ABP: --  ABP(mean): --  RR: 16 (09-23-24 @ 09:02) (16 - 17)  SpO2: 93% (09-23-24 @ 09:02) (93% - 94%)  CVP(mm Hg): --    I/O Detail 24H      PHYSICAL EXAM:  GEN: No acute distress   HEENT: EOMI   RESP: CTA b/l  CV: RRR. Normal S1/S2. No m/r/g. No JVP elevation.  ABD: soft, non-distended  EXT: No edema   NEURO: alert and attentive    LABS:  CBC 09-22-24 @ 05:30                        13.5   12.74 )-----------( 282                   41.5     Hgb trend: 13.5 <-- , 13.9 <--   WBC trend: 12.74 <-- , 13.10 <--     CMP 09-22-24 @ 05:30    141  |  107  |  20  ----------------------------<  153[H]  4.1   |  26  |  1.10    Phos  5.0     09-22  Mg     1.6     09-22    TPro  5.9[L]  /  Alb  3.1[L]  /  TBili  0.3  /  DBili  x   /  AST  18  /  ALT  26  /  AlkPhos  101     09-22    Serum Cr (eGFR) trend: 1.10 (88) <-- , 1.14 (84) <--       Cardiac Markers         STUDIES:

## 2024-09-23 NOTE — DISCHARGE NOTE NURSING/CASE MANAGEMENT/SOCIAL WORK - NSDCFUADDAPPT_GEN_ALL_CORE_FT
Please call [facility] located at [address] [place] [address], phone: [phone] to schedule an appointment with your primary care provider.     Please call the office of Dr. Steven Mtz to schedule an appointment within 1-2 weeks of discharge.

## 2024-09-23 NOTE — PROGRESS NOTE ADULT - PROVIDER SPECIALTY LIST ADULT
Cardiology
Endocrinology
Endocrinology
Internal Medicine
Internal Medicine
Pulmonology
Endocrinology
Internal Medicine
MICU
Pulmonology
MICU
Pulmonology
Internal Medicine
Pulmonology
Hospitalist
Internal Medicine

## 2024-09-23 NOTE — PROGRESS NOTE ADULT - PROBLEM SELECTOR PLAN 4
Resolved  Hess exchanged 9/14  At OSH noted to have increase in BUN and Cr with concern for anuria, making urine at [facility]   Suspect prerenal in setting of cardiogenic shock with possibly septic shock   Cr. 3.49 ----> 2.70 ---> 1.17 -> 1.14  Urine Lytes 9/15: Na<20, Cr:120, Urea: 647, Osm: 396     Plan:  - trend BMP

## 2024-09-23 NOTE — PROGRESS NOTE ADULT - ASSESSMENT
38M with HTN, DM2, hepatitis initially presented to [facility] with SOB and cough transferred from [facility] for AHRF and intubated to [facility] MICU. Extubated and weaned to [facility] and then [place]. Pulmonology consulted for AHRF.    At [place], he was started on a course of antibiotics at [facility] with an initial differential of severe CAP vs Organizing Pneumonia vs PJP. Hospital course prior to transfer was complicated by newly reduced EF of 25% and cardiogenic shock requiring dobutamine and levophed. Bronchoscopy was performed on 9/13 with no BAL, cultures NGTD. HIV was negative.     9/10-9/14 treated with CTX  9/10-9/12 treated with azithromycin   9/11-9/12 treated with bactrim  9/11-9/14 treated with solumedrol  9/13 started on zosyn     Data Review:  CT chest 9/14: Extensive bilateral patchy and consolidative opacities decreased in the upper lobes and increased in the lower lobes compared to prior.   TTE:  EF 40% with mildly reduced LV systolic function, mild dilated Lv and mild symmetric LV hypertrophy  procal elevated to 4.4 --> 0.2 ( on repeat)  Fungitell, c-ANCA, p-ANCA, Atypical ANCA, RF, CCP, DS DNA, Anti-López Ab, Anti-Ribonuclear Protein, Anti Solange-1, Scleroderma Ab, Anti SS-A Ab, Anti SS-B Ab, ANCA Reflex MPO and PROT3 negative     # Acute Hypoxic RF  # Community acquired pneumonia  # Pulmonary edema w/ new HFrEF    With sudden decompensation, AHRF etiology is unclear with severe PNA vs. aspiration event leading to ARDS. Newly reduced EF with cardiogenic shock at [place] suggest that cardiogenic pulmonary also contributed as possible cause of his clinical picture. He was also started on steroids and had rapid improvement temporally so eosinophillic pneumonia was on the differential but difficult to say without BAL cell count prior to steroid administration vs rapid improvement with steroids also supports eosinophilic PNA as a differential dx, however unable to confirm with no recent BAL prior to steroids. He is doing well w/ diuresis and now on nasal cannula. We are titrating steroids fast to see if he has worsening symptoms.    Recommendations  --s/p 7 day course of Zosyn  --now off prednisone after rapid taper doing well  --maintain euvolemia; diurese accordingly  --continue to wean O2 as tolerated  --please organize outpatient f/u with Pulmonary medicine (Dr. Mtz or Dr. Corey in 2 weeks)    Seen and discussed w/ Dr. Mtz     38M with HTN, DM2, hepatitis initially presented to [facility] with SOB and cough transferred from [facility] for AHRF and intubated to [facility] MICU. Extubated and weaned to [facility] and then [place]. Pulmonology consulted for AHRF.    At [place], he was started on a course of antibiotics at [facility] with an initial differential of severe CAP vs Organizing Pneumonia vs PJP. Hospital course prior to transfer was complicated by newly reduced EF of 25% and cardiogenic shock requiring dobutamine and levophed. Bronchoscopy was performed on 9/13 with no BAL, cultures NGTD. HIV was negative.     9/10-9/14 treated with CTX  9/10-9/12 treated with azithromycin   9/11-9/12 treated with bactrim  9/11-9/14 treated with solumedrol  9/13 started on zosyn     Data Review:  CT chest 9/14: Extensive bilateral patchy and consolidative opacities decreased in the upper lobes and increased in the lower lobes compared to prior.   TTE:  EF 40% with mildly reduced LV systolic function, mild dilated Lv and mild symmetric LV hypertrophy  procal elevated to 4.4 --> 0.2 ( on repeat)  Fungitell, c-ANCA, p-ANCA, Atypical ANCA, RF, CCP, DS DNA, Anti-López Ab, Anti-Ribonuclear Protein, Anti Solange-1, Scleroderma Ab, Anti SS-A Ab, Anti SS-B Ab, ANCA Reflex MPO and PROT3 negative     # Acute Hypoxic RF  # Community acquired pneumonia  # Pulmonary edema w/ new HFrEF    With sudden decompensation, AHRF etiology is unclear with severe PNA vs. aspiration event leading to ARDS. Newly reduced EF with cardiogenic shock at [place] suggest that cardiogenic pulmonary also contributed as possible cause of his clinical picture. He was also started on steroids and had rapid improvement temporally so eosinophillic pneumonia was on the differential but difficult to say without BAL cell count prior to steroid administration vs rapid improvement with steroids also supports eosinophilic PNA as a differential dx, however unable to confirm with no recent BAL prior to steroids. He is doing well w/ diuresis and now on nasal cannula. We are titrating steroids fast to see if he has worsening symptoms.    Recommendations  --s/p 7 day course of Zosyn  --tolerated rapid taper off steroids, prednisone 5 mg daily today and then can be off therafter  --maintain euvolemia; diurese accordingly  --continue to wean O2 as tolerated  --please organize outpatient f/u with Pulmonary medicine (Dr. Mtz or Dr. Corey in 2 weeks)    Seen and discussed w/ Dr. Mtz

## 2024-09-23 NOTE — PROGRESS NOTE ADULT - PROBLEM SELECTOR PLAN 7
F: as needed  E: Replete if K<4, Mg<2  N: CCB  GI: PPI  DVT: heparin subq  DISPO: [facility] [place]
F: as needed  E: Replete if K<4, Mg<2  N: CCB  GI: PPI  DVT: heparin subq  DISPO: [facility]
F: as needed  E: Replete if K<4, Mg<2  N: CCB  GI: PPI  DVT: heparin subq  DISPO: [facility]
F: as needed  E: Replete if K<4, Mg<2  N: CCB  GI: PPI  DVT: heparin subq  DISPO: [facility]

## 2024-09-23 NOTE — PROGRESS NOTE ADULT - NUTRITIONAL ASSESSMENT
This patient has been assessed with a concern for Malnutrition and has been determined to have a diagnosis/diagnoses of Morbid obesity (BMI > 40).    This patient is being managed with:   Diet Consistent Carbohydrate w/Evening Snack-  Entered: Sep 15 2024  2:54PM  
This patient has been assessed with a concern for Malnutrition and has been determined to have a diagnosis/diagnoses of Morbid obesity (BMI > 40).    This patient is being managed with:   Diet NPO after Midnight-     NPO Start Date: 19-Sep-2024   NPO Start Time: 23:59  Except Medications  Entered: Sep 19 2024 11:58AM    Diet NPO after Midnight-     NPO Start Date: 19-Sep-2024   NPO Start Time: 23:59  Entered: Sep 19 2024 11:35AM    Diet Consistent Carbohydrate w/Evening Snack-  Entered: Sep 15 2024  2:54PM  
This patient has been assessed with a concern for Malnutrition and has been determined to have a diagnosis/diagnoses of Morbid obesity (BMI > 40).    This patient is being managed with:   Diet Consistent Carbohydrate w/Evening Snack-  Entered: Sep 15 2024  2:54PM  
This patient has been assessed with a concern for Malnutrition and has been determined to have a diagnosis/diagnoses of Morbid obesity (BMI > 40).    This patient is being managed with:   Diet Consistent Carbohydrate w/Evening Snack-  Entered: Sep 15 2024  2:54PM

## 2024-09-23 NOTE — PROGRESS NOTE ADULT - ASSESSMENT
38-year-old male hypertension, DM2, HLD, hepatitis initially presented with acute hypoxic respiratory failure c/b undifferentiated shock w/ new HFrEF. cardiology consulted for new diagnosis of heart failure.     Review of Studies:  MRI 9/20/24: LV dilated. No evidence of hypertrophy. Global systolic function reduced with global hypokinesis. LVEF 43%. RV normal. Global systolic RV function normal RVEF 61%. Mild MR. On delayed enhancement imaging, there is mid myocardial   hyperenhancement of the mid fabrice septum and mid infero septum in a non   ischemic pattern. This can be seen in an infectious or inflammatory process.  TTE 9/16/24: LVIDd 6, LV mildly dilated and mild concentric LVH w/ LVEF 40-45% w/ RWMA, RV normal size and function, LA mildly dilated, RA borderline dilated, trace AR, insufficient TR for PASP, IVC estimated RAP 8   TTE 9/12/24: Left ventricular systolic function is severely decreased with an ejection fraction of 25 % by Valle's method of disks. Global left ventricular hypokinesis. The right ventricle is not well visualized, probably normal right ventricular systolic function. The cardiac valves are not well seen except for the aortic valve which appears normal. No pericardial effusion seen.  ECG: Sinus tachycardia with left axis deviation, NSST changes     #HFrEF (EF 40-45% 9/2024)  Etiology: Given evident dilation likely acute on chronic process. Acute stressor with infection/coxsackie positive consistent with myocarditis picture with underlying dilated cardiomyopathy possible due to etoh use. Iron studies nml, HIV neg, TSH nml.   GDMT:   - Continue Spironolactone 25 mg q24  - Increase to Valsartan 160mg q24  - STOP metoprolol succinate 25 q24, START Coreg 6.25mg BID  Diuresis:   - No SGLT2i/ARNI due to insurance coverage.   - Currently euvolemic, discharge on two weeks of lasix 20mg po q24  - Close follow up with heart failure, can make an appointment with Dr. Kirti Burris     #DM  - Consider type 1 DM work up as well as endocrine consult to help establish care as well as discharge recs for uncontrolled     #HLD  - Continue atorvastatin 40 mg q24    #HTN  - See GDMT above

## 2024-09-23 NOTE — DISCHARGE NOTE NURSING/CASE MANAGEMENT/SOCIAL WORK - NSDCPEFALRISK_GEN_ALL_CORE
For information on Fall & Injury Prevention, visit: [email or web address] OR  [email or web address] OR  https://www.cdc.gov/steadi/patient.html

## 2024-09-23 NOTE — PROGRESS NOTE ADULT - ATTENDING COMMENTS
Patient seen and examined by me. Case discussed with resident and agree with the resident's findings and plan as documented in the resident's note.    -MRI reviewed  -per cardiology, CCTA is likely not needed anymore in-house  -patient to f/u closely with PCP who lives by his home    -see discharge summary in sunrise   -time spent discharging patient = 39min  -D/C home.

## 2024-09-23 NOTE — PROGRESS NOTE ADULT - SUBJECTIVE AND OBJECTIVE BOX
SUBJECTIVE / INTERVAL HPI: Patient was seen and examined this morning.     Overnight events: none    CAPILLARY BLOOD GLUCOSE & INSULIN RECEIVED  178 mg/dL (09-22 @ 09:12)  169 mg/dL (09-22 @ 13:42)  115 mg/dL (09-22 @ 17:54)  120 mg/dL (09-22 @ 22:04)  153 mg/dL (09-23 @ 09:22)      REVIEW OF SYSTEMS  Constitutional:  Negative fever, chills or loss of appetite.  Eyes:  Negative blurry vision or double vision.  Cardiovascular:  Negative for chest pain or palpitations.  Respiratory:  Negative for cough, wheezing, or shortness of breath.    Gastrointestinal:  Negative for nausea, vomiting, diarrhea, constipation, or abdominal pain.  Genitourinary:  Negative frequency, urgency or dysuria.  Neurologic:  No headache, confusion, dizziness, lightheadedness.    PHYSICAL EXAM  Vital Signs Last 24 Hrs  T(C): 36.7 (23 Sep 2024 09:02), Max: 36.8 (22 Sep 2024 15:31)  T(F): 98 (23 Sep 2024 09:02), Max: 98.2 (22 Sep 2024 15:31)  HR: 90 (23 Sep 2024 09:02) (90 - 101)  BP: 123/83 (23 Sep 2024 09:02) (123/83 - 148/94)  BP(mean): --  RR: 16 (23 Sep 2024 09:02) (16 - 17)  SpO2: 93% (23 Sep 2024 09:02) (93% - 94%)    Parameters below as of 23 Sep 2024 09:02  Patient On (Oxygen Delivery Method): room air        Constitutional: Awake, alert, in no acute distress.   HEENT: Normocephalic, atraumatic, BONNIE.  Respiratory: Lungs clear to ausculation bilaterally.   Cardiovascular: regular rhythm, normal S1 and S2, no audible murmurs.   GI: soft, non-tender, non-distended, bowel sounds present.  Extremities: No lower extremity edema.  Psychiatric: AAO x 3. Normal affect/mood.     LABS  CBC - WBC/HGB/HTC/PLT: 12.74/13.5/41.5/282 (09-22-24)  BMP - Na/K/Cl/Bicarb/BUN/Cr/Gluc/AG/eGFR: 141/4.1/107/26/20/1.10/153/8/88 (09-22-24)  Ca - 8.9 (09-22-24)  Phos - 5.0 (09-22-24)  Mg - 1.6 (09-22-24)  LFT - Alb/Tprot/Tbili/Dbili/AlkPhos/ALT/AST: 3.1/--/0.3/--/101/26/18 (09-22-24)    Thyroid Stimulating Hormone, Serum: 1.100 (09-17-24)          MEDICATIONS  MEDICATIONS  (STANDING):  artificial  tears Solution 1 Drop(s) Both EYES four times a day  atorvastatin 40 milliGRAM(s) Oral at bedtime  chlorhexidine 2% Cloths 1 Application(s) Topical <User Schedule>  dextrose 5%. 1000 milliLiter(s) (50 mL/Hr) IV Continuous <Continuous>  dextrose 5%. 1000 milliLiter(s) (100 mL/Hr) IV Continuous <Continuous>  dextrose 50% Injectable 25 Gram(s) IV Push once  dextrose 50% Injectable 25 Gram(s) IV Push once  dextrose 50% Injectable 12.5 Gram(s) IV Push once  furosemide    Tablet 20 milliGRAM(s) Oral daily  glucagon  Injectable 1 milliGRAM(s) IntraMuscular once  heparin   Injectable 7500 Unit(s) SubCutaneous every 8 hours  insulin glargine Injectable (LANTUS) 22 Unit(s) SubCutaneous at bedtime  insulin lispro (ADMELOG) corrective regimen sliding scale   SubCutaneous at bedtime  insulin lispro (ADMELOG) corrective regimen sliding scale   SubCutaneous three times a day before meals  insulin lispro Injectable (ADMELOG) 11 Unit(s) SubCutaneous three times a day before meals  metoprolol succinate ER 25 milliGRAM(s) Oral every 24 hours  nystatin    Suspension 681745 Unit(s) Swish and Swallow every 6 hours  pantoprazole    Tablet 40 milliGRAM(s) Oral before breakfast  sodium phosphate 15 milliMole(s)/250 mL IVPB 15 milliMole(s) IV Intermittent once  spironolactone 25 milliGRAM(s) Oral daily  valsartan 80 milliGRAM(s) Oral every 12 hours    MEDICATIONS  (PRN):  dextrose Oral Gel 15 Gram(s) Oral once PRN Blood Glucose LESS THAN 70 milliGRAM(s)/deciliter    ASSESSMENT / RECOMMENDATIONS  Pt is a 38-year-old male hypertension, DM2, HLD, hepatitis admitted for treatment of acute hypoxic respiratory failure 2/2 hypersensitivity pneumonitis vs eosinophilic pneumonia, course complicated by cardiogenic shock 2/2 new HF, pending possible CCTA on 9/23.  Endocrinology consulted for DM management    A1C: 11.6 %  BUN: 20  Creatinine: 1.10  GFR: 88  Weight: 109.7  BMI: 42.9    # Type 2 diabetes mellitus with hyperglycemia  Pt was kept NPO after MN for possible CCTA 9/23.  - Please keep lantus  units at bedtime.   - keep lispro   units before each meal.  - Continue lispro moderate dose sliding scale before meals and at bedtime.  - Patient's fingerstick glucose goal is 100-180 mg/dL.    - Discharge recommendations to be discussed.   - Patient can follow up at discharge with [facility] by calling [phone] to make an appointment.      Case discussed with Dr. Brantley. Primary team updated.      *************INCOMPLETE NOTE****************   SUBJECTIVE / INTERVAL HPI: Patient was seen and examined this morning. Pt reports being frustrated that he is just sitting and waiting in the hospital for cardiac MR results. Otherwise, feels fine on RA and feels ready to go home. Pt ate all of his dinner last night and ate all of his breakfast this AM. Has not had lunch yet.    Overnight events: none    CAPILLARY BLOOD GLUCOSE & INSULIN RECEIVED  178 mg/dL (09-22 @ 09:12)  169 mg/dL (09-22 @ 13:42)  115 mg/dL (09-22 @ 17:54)  120 mg/dL (09-22 @ 22:04)  153 mg/dL (09-23 @ 09:22)      REVIEW OF SYSTEMS  Constitutional:  Negative fever, chills or loss of appetite.  Eyes:  Negative blurry vision or double vision.  Cardiovascular:  Negative for chest pain or palpitations.  Respiratory:  Negative for cough, wheezing, or shortness of breath.    Gastrointestinal:  Negative for nausea, vomiting, diarrhea, constipation, or abdominal pain.  Genitourinary:  Negative frequency, urgency or dysuria.  Neurologic:  No headache, confusion, dizziness, lightheadedness.    PHYSICAL EXAM  Vital Signs Last 24 Hrs  T(C): 36.7 (23 Sep 2024 09:02), Max: 36.8 (22 Sep 2024 15:31)  T(F): 98 (23 Sep 2024 09:02), Max: 98.2 (22 Sep 2024 15:31)  HR: 90 (23 Sep 2024 09:02) (90 - 101)  BP: 123/83 (23 Sep 2024 09:02) (123/83 - 148/94)  BP(mean): --  RR: 16 (23 Sep 2024 09:02) (16 - 17)  SpO2: 93% (23 Sep 2024 09:02) (93% - 94%)    Parameters below as of 23 Sep 2024 09:02  Patient On (Oxygen Delivery Method): room air        Constitutional: Awake, alert, in no acute distress.   HEENT: Normocephalic, atraumatic, BONNIE.  Respiratory: Lungs clear to ausculation bilaterally.   Cardiovascular: regular rhythm, normal S1 and S2, no audible murmurs.   GI: soft, non-tender, non-distended, bowel sounds present.  Extremities: No lower extremity edema.  Psychiatric: AAO x 3. Normal affect/mood.     LABS  CBC - WBC/HGB/HTC/PLT: 12.74/13.5/41.5/282 (09-22-24)  BMP - Na/K/Cl/Bicarb/BUN/Cr/Gluc/AG/eGFR: 141/4.1/107/26/20/1.10/153/8/88 (09-22-24)  Ca - 8.9 (09-22-24)  Phos - 5.0 (09-22-24)  Mg - 1.6 (09-22-24)  LFT - Alb/Tprot/Tbili/Dbili/AlkPhos/ALT/AST: 3.1/--/0.3/--/101/26/18 (09-22-24)    Thyroid Stimulating Hormone, Serum: 1.100 (09-17-24)          MEDICATIONS  MEDICATIONS  (STANDING):  artificial  tears Solution 1 Drop(s) Both EYES four times a day  atorvastatin 40 milliGRAM(s) Oral at bedtime  chlorhexidine 2% Cloths 1 Application(s) Topical <User Schedule>  dextrose 5%. 1000 milliLiter(s) (50 mL/Hr) IV Continuous <Continuous>  dextrose 5%. 1000 milliLiter(s) (100 mL/Hr) IV Continuous <Continuous>  dextrose 50% Injectable 25 Gram(s) IV Push once  dextrose 50% Injectable 25 Gram(s) IV Push once  dextrose 50% Injectable 12.5 Gram(s) IV Push once  furosemide    Tablet 20 milliGRAM(s) Oral daily  glucagon  Injectable 1 milliGRAM(s) IntraMuscular once  heparin   Injectable 7500 Unit(s) SubCutaneous every 8 hours  insulin glargine Injectable (LANTUS) 22 Unit(s) SubCutaneous at bedtime  insulin lispro (ADMELOG) corrective regimen sliding scale   SubCutaneous at bedtime  insulin lispro (ADMELOG) corrective regimen sliding scale   SubCutaneous three times a day before meals  insulin lispro Injectable (ADMELOG) 11 Unit(s) SubCutaneous three times a day before meals  metoprolol succinate ER 25 milliGRAM(s) Oral every 24 hours  nystatin    Suspension [id number] Unit(s) Swish and Swallow every 6 hours  pantoprazole    Tablet 40 milliGRAM(s) Oral before breakfast  sodium phosphate 15 milliMole(s)/250 mL IVPB 15 milliMole(s) IV Intermittent once  spironolactone 25 milliGRAM(s) Oral daily  valsartan 80 milliGRAM(s) Oral every 12 hours    MEDICATIONS  (PRN):  dextrose Oral Gel 15 Gram(s) Oral once PRN Blood Glucose LESS THAN 70 milliGRAM(s)/deciliter    ASSESSMENT / RECOMMENDATIONS  Pt is a 38-year-old male hypertension, DM2, HLD, hepatitis admitted for treatment of acute hypoxic respiratory failure 2/2 hypersensitivity pneumonitis vs eosinophilic pneumonia, course complicated by cardiogenic shock 2/2 new HF, pending possible CCTA on 9/23.  Endocrinology consulted for DM management    A1C: 11.6 %  BUN: 20  Creatinine: 1.10  GFR: 88  Weight: 109.7  BMI: 42.9    # Type 2 diabetes mellitus with hyperglycemia  Pt was kept NPO after MN for possible CCTA 9/23.  - Please keep lantus 22 units at bedtime.   - keep lispro 11  units before each meal.  - Continue lispro moderate dose sliding scale before meals and at bedtime.  - Patient's fingerstick glucose goal is 100-180 mg/dL.    - Discharge recommendations to be discussed.   - Patient can follow up at discharge with [facility] by calling [phone] to make an appointment.      Case discussed with Dr. Brantley. Primary team updated.      *************INCOMPLETE NOTE****************   SUBJECTIVE / INTERVAL HPI: Patient was seen and examined this morning. Pt reports being frustrated that he is just sitting and waiting in the hospital for cardiac MR results. Otherwise, feels fine on RA and feels ready to go home. Pt ate all of his dinner last night and ate all of his breakfast this AM. Has not had lunch yet.    Overnight events: none    CAPILLARY BLOOD GLUCOSE & INSULIN RECEIVED  178 mg/dL (09-22 @ 09:12)  169 mg/dL (09-22 @ 13:42)  115 mg/dL (09-22 @ 17:54)  120 mg/dL (09-22 @ 22:04)  153 mg/dL (09-23 @ 09:22)      REVIEW OF SYSTEMS  Constitutional:  Negative fever, chills or loss of appetite.  Eyes:  Negative blurry vision or double vision.  Cardiovascular:  Negative for chest pain or palpitations.  Respiratory:  Negative for cough, wheezing, or shortness of breath.    Gastrointestinal:  Negative for nausea, vomiting, diarrhea, constipation, or abdominal pain.  Genitourinary:  Negative frequency, urgency or dysuria.  Neurologic:  No headache, confusion, dizziness, lightheadedness.    PHYSICAL EXAM  Vital Signs Last 24 Hrs  T(C): 36.7 (23 Sep 2024 09:02), Max: 36.8 (22 Sep 2024 15:31)  T(F): 98 (23 Sep 2024 09:02), Max: 98.2 (22 Sep 2024 15:31)  HR: 90 (23 Sep 2024 09:02) (90 - 101)  BP: 123/83 (23 Sep 2024 09:02) (123/83 - 148/94)  BP(mean): --  RR: 16 (23 Sep 2024 09:02) (16 - 17)  SpO2: 93% (23 Sep 2024 09:02) (93% - 94%)    Parameters below as of 23 Sep 2024 09:02  Patient On (Oxygen Delivery Method): room air        Constitutional: Awake, alert, in no acute distress.   HEENT: Normocephalic, atraumatic, BONNIE.  Respiratory: Lungs clear to ausculation bilaterally.   Cardiovascular: regular rhythm, normal S1 and S2, no audible murmurs.   GI: soft, non-tender, non-distended, bowel sounds present.  Extremities: No lower extremity edema.  Psychiatric: AAO x 3. Normal affect/mood.     LABS  CBC - WBC/HGB/HTC/PLT: 12.74/13.5/41.5/282 (09-22-24)  BMP - Na/K/Cl/Bicarb/BUN/Cr/Gluc/AG/eGFR: 141/4.1/107/26/20/1.10/153/8/88 (09-22-24)  Ca - 8.9 (09-22-24)  Phos - 5.0 (09-22-24)  Mg - 1.6 (09-22-24)  LFT - Alb/Tprot/Tbili/Dbili/AlkPhos/ALT/AST: 3.1/--/0.3/--/101/26/18 (09-22-24)    Thyroid Stimulating Hormone, Serum: 1.100 (09-17-24)          MEDICATIONS  MEDICATIONS  (STANDING):  artificial  tears Solution 1 Drop(s) Both EYES four times a day  atorvastatin 40 milliGRAM(s) Oral at bedtime  chlorhexidine 2% Cloths 1 Application(s) Topical <User Schedule>  dextrose 5%. 1000 milliLiter(s) (50 mL/Hr) IV Continuous <Continuous>  dextrose 5%. 1000 milliLiter(s) (100 mL/Hr) IV Continuous <Continuous>  dextrose 50% Injectable 25 Gram(s) IV Push once  dextrose 50% Injectable 25 Gram(s) IV Push once  dextrose 50% Injectable 12.5 Gram(s) IV Push once  furosemide    Tablet 20 milliGRAM(s) Oral daily  glucagon  Injectable 1 milliGRAM(s) IntraMuscular once  heparin   Injectable 7500 Unit(s) SubCutaneous every 8 hours  insulin glargine Injectable (LANTUS) 22 Unit(s) SubCutaneous at bedtime  insulin lispro (ADMELOG) corrective regimen sliding scale   SubCutaneous at bedtime  insulin lispro (ADMELOG) corrective regimen sliding scale   SubCutaneous three times a day before meals  insulin lispro Injectable (ADMELOG) 11 Unit(s) SubCutaneous three times a day before meals  metoprolol succinate ER 25 milliGRAM(s) Oral every 24 hours  nystatin    Suspension 336343 Unit(s) Swish and Swallow every 6 hours  pantoprazole    Tablet 40 milliGRAM(s) Oral before breakfast  sodium phosphate 15 milliMole(s)/250 mL IVPB 15 milliMole(s) IV Intermittent once  spironolactone 25 milliGRAM(s) Oral daily  valsartan 80 milliGRAM(s) Oral every 12 hours    MEDICATIONS  (PRN):  dextrose Oral Gel 15 Gram(s) Oral once PRN Blood Glucose LESS THAN 70 milliGRAM(s)/deciliter    ASSESSMENT / RECOMMENDATIONS  Pt is a 38-year-old male hypertension, DM2, HLD, hepatitis admitted for treatment of acute hypoxic respiratory failure 2/2 hypersensitivity pneumonitis vs eosinophilic pneumonia, course complicated by cardiogenic shock 2/2 new HF, pending possible CCTA on 9/23.  Endocrinology consulted for DM management    A1C: 11.6 %  BUN: 20  Creatinine: 1.10  GFR: 88  Weight: 109.7  BMI: 42.9    # Type 2 diabetes mellitus with hyperglycemia  - upon discharge, please keep triseba 22 units at bedtime.   - upon discharge, keep novolog 11  units before each meal.  - Patient's fingerstick glucose goal is 100-180 mg/dL.    - Discharge recommendations to be discussed.   - Pt is uninsured but should be able to get 1 month of insulin. After that, please let pt know to set up an appointment with Endocrinologist in his home town.      Case discussed with Dr. Brantley. Primary team updated.

## 2024-09-23 NOTE — PROGRESS NOTE ADULT - PROBLEM SELECTOR PLAN 2
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   POCUS on admission 9/14 showed moderate to severe LV dysfunction with global hypokinesis  Now off pressor support. A-line removed  Repeat TTE on 9/16, 40-45%, improved reported TTE at [place] 25%.   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   POCUS on admission 9/14 showed moderate to severe LV dysfunction with global hypokinesis  Now off pressor support. A-line removed  Repeat TTE on 9/16, 40-45%, improved reported TTE at [place] 25%.   Cardiac MRA done on 9/20 showing global hypokinesis and evidence of tissue inflammation but no signs of ischemia     Plan  - c/w valsartan to 40mg BID with hold parameters  - c/w lopressor 12.5mg BID  - c/w lasix 20mg qd  - c/w spironolactone 25mg qd  - c/w lipitor 40mg qhs  - Goal MAP >65  - Cardiology following, recs appreciated -- pending recs for CCTA today given MR cardiac showing no signs of ischemia  - Will coordinate OP cardiology f/u

## 2024-09-30 DIAGNOSIS — T38.3X6A UNDERDOSING OF INSULIN AND ORAL HYPOGLYCEMIC [ANTIDIABETIC] DRUGS, INITIAL ENCOUNTER: ICD-10-CM

## 2024-09-30 DIAGNOSIS — I50.23 ACUTE ON CHRONIC SYSTOLIC (CONGESTIVE) HEART FAILURE: ICD-10-CM

## 2024-09-30 DIAGNOSIS — Z91.148 PATIENT'S OTHER NONCOMPLIANCE WITH MEDICATION REGIMEN FOR OTHER REASON: ICD-10-CM

## 2024-09-30 DIAGNOSIS — J82.81 CHRONIC EOSINOPHILIC PNEUMONIA: ICD-10-CM

## 2024-09-30 DIAGNOSIS — E66.01 MORBID (SEVERE) OBESITY DUE TO EXCESS CALORIES: ICD-10-CM

## 2024-09-30 DIAGNOSIS — J80 ACUTE RESPIRATORY DISTRESS SYNDROME: ICD-10-CM

## 2024-09-30 DIAGNOSIS — Z59.86 FINANCIAL INSECURITY: ICD-10-CM

## 2024-09-30 DIAGNOSIS — Z59.7 INSUFFICIENT SOCIAL INSURANCE AND WELFARE SUPPORT: ICD-10-CM

## 2024-09-30 DIAGNOSIS — E78.1 PURE HYPERGLYCERIDEMIA: ICD-10-CM

## 2024-09-30 DIAGNOSIS — I11.0 HYPERTENSIVE HEART DISEASE WITH HEART FAILURE: ICD-10-CM

## 2024-09-30 DIAGNOSIS — A41.9 SEPSIS, UNSPECIFIED ORGANISM: ICD-10-CM

## 2024-09-30 DIAGNOSIS — B33.22 VIRAL MYOCARDITIS: ICD-10-CM

## 2024-09-30 DIAGNOSIS — R57.0 CARDIOGENIC SHOCK: ICD-10-CM

## 2024-09-30 DIAGNOSIS — T46.6X6A UNDERDOSING OF ANTIHYPERLIPIDEMIC AND ANTIARTERIOSCLEROTIC DRUGS, INITIAL ENCOUNTER: ICD-10-CM

## 2024-09-30 DIAGNOSIS — R65.21 SEVERE SEPSIS WITH SEPTIC SHOCK: ICD-10-CM

## 2024-09-30 DIAGNOSIS — Z11.52 ENCOUNTER FOR SCREENING FOR COVID-19: ICD-10-CM

## 2024-09-30 DIAGNOSIS — T50.2X6A UNDERDOSING OF CARBONIC-ANHYDRASE INHIBITORS, BENZOTHIADIAZIDES AND OTHER DIURETICS, INITIAL ENCOUNTER: ICD-10-CM

## 2024-09-30 DIAGNOSIS — N17.0 ACUTE KIDNEY FAILURE WITH TUBULAR NECROSIS: ICD-10-CM

## 2024-09-30 DIAGNOSIS — F10.90 ALCOHOL USE, UNSPECIFIED, UNCOMPLICATED: ICD-10-CM

## 2024-09-30 DIAGNOSIS — K75.9 INFLAMMATORY LIVER DISEASE, UNSPECIFIED: ICD-10-CM

## 2024-09-30 DIAGNOSIS — J67.9 HYPERSENSITIVITY PNEUMONITIS DUE TO UNSPECIFIED ORGANIC DUST: ICD-10-CM

## 2024-09-30 DIAGNOSIS — I42.8 OTHER CARDIOMYOPATHIES: ICD-10-CM

## 2024-09-30 DIAGNOSIS — Z91.018 ALLERGY TO OTHER FOODS: ICD-10-CM

## 2024-09-30 DIAGNOSIS — E78.5 HYPERLIPIDEMIA, UNSPECIFIED: ICD-10-CM

## 2024-09-30 DIAGNOSIS — Z87.891 PERSONAL HISTORY OF NICOTINE DEPENDENCE: ICD-10-CM

## 2024-09-30 DIAGNOSIS — E11.65 TYPE 2 DIABETES MELLITUS WITH HYPERGLYCEMIA: ICD-10-CM

## 2024-09-30 DIAGNOSIS — T83.098A OTHER MECHANICAL COMPLICATION OF OTHER URINARY CATHETER, INITIAL ENCOUNTER: ICD-10-CM
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
## 2024-09-30 DIAGNOSIS — Z95.828 PRESENCE OF OTHER VASCULAR IMPLANTS AND GRAFTS: ICD-10-CM

## 2024-09-30 SDOH — ECONOMIC STABILITY - INCOME SECURITY: FINANCIAL INSECURITY: Z59.86

## 2024-09-30 SDOH — ECONOMIC STABILITY - INCOME SECURITY: INSUFFICIENT SOCIAL INSURANCE AND WELFARE SUPPORT: Z59.7

## 2024-10-03 LAB
C IMMITIS AB SER QL IA: NEGATIVE — SIGNIFICANT CHANGE UP
EJ: NEGATIVE — SIGNIFICANT CHANGE UP
ENA JO1 AB SER IA-ACNC: <20 UNITS — SIGNIFICANT CHANGE UP
ENA PM/SCL AB SER-ACNC: <20 UNITS — SIGNIFICANT CHANGE UP
ENA SM+RNP AB SER IA-ACNC: <20 UNITS — SIGNIFICANT CHANGE UP
ENA SS-A IGG SER QL: <20 UNITS — SIGNIFICANT CHANGE UP
FIBRILLARIN AB SER QL: NEGATIVE — SIGNIFICANT CHANGE UP
KU AB SER QL: NEGATIVE — SIGNIFICANT CHANGE UP
MDA-5 (P140)(CADM-140): <20 UNITS — SIGNIFICANT CHANGE UP
MI2 AB SER QL: NEGATIVE — SIGNIFICANT CHANGE UP
NXP-2 (P140): <20 UNITS — SIGNIFICANT CHANGE UP
OJ AB SER QL: NEGATIVE — SIGNIFICANT CHANGE UP
PL12 AB SER QL: NEGATIVE — SIGNIFICANT CHANGE UP
PL7 AB SER QL: NEGATIVE — SIGNIFICANT CHANGE UP
SRP AB SERPL QL: NEGATIVE — SIGNIFICANT CHANGE UP
TIF GAMMA (P155/140): <20 UNITS — SIGNIFICANT CHANGE UP
U2 SNRNP AB SER QL: NEGATIVE — SIGNIFICANT CHANGE UP

## 2024-10-08 ENCOUNTER — NON-APPOINTMENT (OUTPATIENT)
Age: 39
End: 2024-10-08

## 2024-10-08 ENCOUNTER — APPOINTMENT (OUTPATIENT)
Dept: HEART AND VASCULAR | Facility: CLINIC | Age: 39
End: 2024-10-08
Payer: MEDICAID

## 2024-10-08 VITALS
DIASTOLIC BLOOD PRESSURE: 84 MMHG | WEIGHT: 223 LBS | TEMPERATURE: 97.1 F | HEIGHT: 64 IN | BODY MASS INDEX: 38.07 KG/M2 | OXYGEN SATURATION: 99 % | SYSTOLIC BLOOD PRESSURE: 130 MMHG | HEART RATE: 87 BPM

## 2024-10-08 DIAGNOSIS — I10 ESSENTIAL (PRIMARY) HYPERTENSION: ICD-10-CM

## 2024-10-08 DIAGNOSIS — E11.9 TYPE 2 DIABETES MELLITUS W/OUT COMPLICATIONS: ICD-10-CM

## 2024-10-08 DIAGNOSIS — E78.5 HYPERLIPIDEMIA, UNSPECIFIED: ICD-10-CM

## 2024-10-08 DIAGNOSIS — Z82.49 FAMILY HISTORY OF ISCHEMIC HEART DISEASE AND OTHER DISEASES OF THE CIRCULATORY SYSTEM: ICD-10-CM

## 2024-10-08 DIAGNOSIS — Z78.9 OTHER SPECIFIED HEALTH STATUS: ICD-10-CM

## 2024-10-08 DIAGNOSIS — Z83.3 FAMILY HISTORY OF DIABETES MELLITUS: ICD-10-CM

## 2024-10-08 DIAGNOSIS — I50.22 CHRONIC SYSTOLIC (CONGESTIVE) HEART FAILURE: ICD-10-CM

## 2024-10-08 DIAGNOSIS — Z79.4 TYPE 2 DIABETES MELLITUS W/OUT COMPLICATIONS: ICD-10-CM

## 2024-10-08 PROCEDURE — 99214 OFFICE O/P EST MOD 30 MIN: CPT

## 2024-10-08 PROCEDURE — G2211 COMPLEX E/M VISIT ADD ON: CPT | Mod: NC

## 2024-10-08 PROCEDURE — 93000 ELECTROCARDIOGRAM COMPLETE: CPT

## 2024-10-22 ENCOUNTER — APPOINTMENT (OUTPATIENT)
Facility: CLINIC | Age: 39
End: 2024-10-22
Payer: MEDICAID

## 2024-10-22 ENCOUNTER — LABORATORY RESULT (OUTPATIENT)
Age: 39
End: 2024-10-22

## 2024-10-22 VITALS
TEMPERATURE: 97.3 F | DIASTOLIC BLOOD PRESSURE: 106 MMHG | WEIGHT: 230 LBS | BODY MASS INDEX: 40.75 KG/M2 | HEIGHT: 63 IN | RESPIRATION RATE: 18 BRPM | OXYGEN SATURATION: 98 % | SYSTOLIC BLOOD PRESSURE: 138 MMHG | HEART RATE: 88 BPM

## 2024-10-22 DIAGNOSIS — Z78.9 OTHER SPECIFIED HEALTH STATUS: ICD-10-CM

## 2024-10-22 DIAGNOSIS — R93.89 ABNORMAL FINDINGS ON DIAGNOSTIC IMAGING OF OTHER SPECIFIED BODY STRUCTURES: ICD-10-CM

## 2024-10-22 DIAGNOSIS — Z82.49 FAMILY HISTORY OF ISCHEMIC HEART DISEASE AND OTHER DISEASES OF THE CIRCULATORY SYSTEM: ICD-10-CM

## 2024-10-22 DIAGNOSIS — Z83.3 FAMILY HISTORY OF DIABETES MELLITUS: ICD-10-CM

## 2024-10-22 PROCEDURE — 99214 OFFICE O/P EST MOD 30 MIN: CPT

## 2024-10-23 PROBLEM — R93.89 ABNORMAL CHEST CT: Status: ACTIVE | Noted: 2024-10-21

## 2024-10-23 LAB
BASOPHILS # BLD AUTO: 0.06 K/UL
BASOPHILS NFR BLD AUTO: 0.7 %
EOSINOPHIL # BLD AUTO: 0.09 K/UL
EOSINOPHIL NFR BLD AUTO: 1 %
HCT VFR BLD CALC: 45.2 %
HGB BLD-MCNC: 14.5 G/DL
IMM GRANULOCYTES NFR BLD AUTO: 0.8 %
LYMPHOCYTES # BLD AUTO: 2.18 K/UL
LYMPHOCYTES NFR BLD AUTO: 24.2 %
MAN DIFF?: NORMAL
MCHC RBC-ENTMCNC: 28.2 PG
MCHC RBC-ENTMCNC: 32.1 GM/DL
MCV RBC AUTO: 87.9 FL
MONOCYTES # BLD AUTO: 0.73 K/UL
MONOCYTES NFR BLD AUTO: 8.1 %
NEUTROPHILS # BLD AUTO: 5.89 K/UL
NEUTROPHILS NFR BLD AUTO: 65.2 %
PLATELET # BLD AUTO: 255 K/UL
RBC # BLD: 5.14 M/UL
RBC # FLD: 14.1 %
WBC # FLD AUTO: 9.02 K/UL

## 2024-10-23 RX ORDER — CARVEDILOL 6.25 MG/1
6.25 TABLET, FILM COATED ORAL
Refills: 0 | Status: ACTIVE | COMMUNITY

## 2024-10-23 RX ORDER — SPIRONOLACTONE 25 MG/1
25 TABLET ORAL
Refills: 0 | Status: ACTIVE | COMMUNITY

## 2024-10-23 RX ORDER — SACUBITRIL AND VALSARTAN 24; 26 MG/1; MG/1
24-26 TABLET, FILM COATED ORAL
Refills: 0 | Status: ACTIVE | COMMUNITY

## 2024-10-23 RX ORDER — METFORMIN HYDROCHLORIDE 500 MG/1
500 TABLET, COATED ORAL
Refills: 0 | Status: ACTIVE | COMMUNITY

## 2024-10-23 RX ORDER — ATORVASTATIN CALCIUM 40 MG/1
40 TABLET, FILM COATED ORAL
Refills: 0 | Status: ACTIVE | COMMUNITY

## 2024-10-24 LAB
A ALTERNATA IGE QN: <0.1 KUA/L
A FUMIGATUS IGE QN: <0.1 KUA/L
ANA SER IF-ACNC: NEGATIVE
C ALBICANS IGE QN: 0.15 KUA/L
C HERBARUM IGE QN: <0.1 KUA/L
CAT DANDER IGE QN: 0.17 KUA/L
COMMON RAGWEED IGE QN: 0.12 KUA/L
D FARINAE IGE QN: 0.1 KUA/L
D PTERONYSS IGE QN: 0.12 KUA/L
DEPRECATED A ALTERNATA IGE RAST QL: 0
DEPRECATED A FUMIGATUS IGE RAST QL: 0
DEPRECATED C ALBICANS IGE RAST QL: NORMAL
DEPRECATED C HERBARUM IGE RAST QL: 0
DEPRECATED CAT DANDER IGE RAST QL: NORMAL
DEPRECATED COMMON RAGWEED IGE RAST QL: NORMAL
DEPRECATED D FARINAE IGE RAST QL: NORMAL
DEPRECATED D PTERONYSS IGE RAST QL: NORMAL
DEPRECATED DOG DANDER IGE RAST QL: 2
DEPRECATED M RACEMOSUS IGE RAST QL: 0
DEPRECATED ROACH IGE RAST QL: 0
DEPRECATED TIMOTHY IGE RAST QL: 3
DEPRECATED WHITE OAK IGE RAST QL: 2
DOG DANDER IGE QN: 0.98 KUA/L
M RACEMOSUS IGE QN: <0.1 KUA/L
ROACH IGE QN: <0.1 KUA/L
TIMOTHY IGE QN: 10 KUA/L
TOTAL IGE SMQN RAST: 138 KU/L
WHITE OAK IGE QN: 1.32 KUA/L

## 2024-12-09 ENCOUNTER — APPOINTMENT (OUTPATIENT)
Dept: HEART FAILURE | Facility: CLINIC | Age: 39
End: 2024-12-09

## 2024-12-09 VITALS
OXYGEN SATURATION: 98 % | SYSTOLIC BLOOD PRESSURE: 147 MMHG | DIASTOLIC BLOOD PRESSURE: 105 MMHG | HEART RATE: 91 BPM | HEIGHT: 63 IN

## 2024-12-09 PROCEDURE — 99204 OFFICE O/P NEW MOD 45 MIN: CPT

## 2024-12-10 LAB
ANION GAP SERPL CALC-SCNC: 13 MMOL/L
BUN SERPL-MCNC: 13 MG/DL
CALCIUM SERPL-MCNC: 9.5 MG/DL
CHLORIDE SERPL-SCNC: 103 MMOL/L
CO2 SERPL-SCNC: 26 MMOL/L
CREAT SERPL-MCNC: 0.82 MG/DL
EGFR: 115 ML/MIN/1.73M2
GLUCOSE SERPL-MCNC: 275 MG/DL
NT-PROBNP SERPL-MCNC: 237 PG/ML
POTASSIUM SERPL-SCNC: 4.7 MMOL/L
SODIUM SERPL-SCNC: 141 MMOL/L

## 2024-12-10 RX ORDER — FUROSEMIDE 20 MG/1
20 TABLET ORAL
Qty: 30 | Refills: 1 | Status: ACTIVE | COMMUNITY
Start: 2024-09-23

## 2024-12-16 ENCOUNTER — RESULT REVIEW (OUTPATIENT)
Age: 39
End: 2024-12-16

## 2025-01-07 ENCOUNTER — APPOINTMENT (OUTPATIENT)
Facility: CLINIC | Age: 40
End: 2025-01-07

## 2025-01-09 ENCOUNTER — APPOINTMENT (OUTPATIENT)
Facility: CLINIC | Age: 40
End: 2025-01-09
Payer: MEDICAID

## 2025-01-09 VITALS
WEIGHT: 230 LBS | DIASTOLIC BLOOD PRESSURE: 87 MMHG | HEIGHT: 63 IN | TEMPERATURE: 98.3 F | SYSTOLIC BLOOD PRESSURE: 122 MMHG | OXYGEN SATURATION: 96 % | HEART RATE: 92 BPM | BODY MASS INDEX: 40.75 KG/M2

## 2025-01-09 DIAGNOSIS — J82.81 CHRONIC EOSINOPHILIC PNEUMONIA: ICD-10-CM

## 2025-01-09 PROCEDURE — 99213 OFFICE O/P EST LOW 20 MIN: CPT

## 2025-01-09 RX ORDER — ALBUTEROL SULFATE 90 UG/1
108 (90 BASE) INHALANT RESPIRATORY (INHALATION)
Qty: 1 | Refills: 3 | Status: ACTIVE | COMMUNITY
Start: 2025-01-09 | End: 1900-01-01

## 2025-01-11 PROBLEM — J82.81 EOSINOPHILIC PNEUMONIA: Status: ACTIVE | Noted: 2025-01-09

## 2025-01-13 ENCOUNTER — APPOINTMENT (OUTPATIENT)
Dept: HEART FAILURE | Facility: CLINIC | Age: 40
End: 2025-01-13
Payer: MEDICAID

## 2025-01-13 ENCOUNTER — NON-APPOINTMENT (OUTPATIENT)
Age: 40
End: 2025-01-13

## 2025-01-13 VITALS
SYSTOLIC BLOOD PRESSURE: 118 MMHG | OXYGEN SATURATION: 98 % | HEART RATE: 85 BPM | BODY MASS INDEX: 42.52 KG/M2 | DIASTOLIC BLOOD PRESSURE: 84 MMHG | HEIGHT: 63 IN | TEMPERATURE: 98.1 F | WEIGHT: 240 LBS

## 2025-01-13 PROCEDURE — 93000 ELECTROCARDIOGRAM COMPLETE: CPT

## 2025-01-13 PROCEDURE — 99214 OFFICE O/P EST MOD 30 MIN: CPT | Mod: 25

## 2025-01-13 RX ORDER — EMPAGLIFLOZIN 10 MG/1
10 TABLET, FILM COATED ORAL
Qty: 30 | Refills: 6 | Status: ACTIVE | COMMUNITY
Start: 2025-01-13 | End: 1900-01-01

## 2025-02-21 ENCOUNTER — NON-APPOINTMENT (OUTPATIENT)
Age: 40
End: 2025-02-21

## 2025-07-02 ENCOUNTER — APPOINTMENT (OUTPATIENT)
Facility: CLINIC | Age: 40
End: 2025-07-02
Payer: MEDICAID

## 2025-07-02 VITALS
WEIGHT: 250 LBS | HEIGHT: 63 IN | DIASTOLIC BLOOD PRESSURE: 95 MMHG | HEART RATE: 83 BPM | SYSTOLIC BLOOD PRESSURE: 145 MMHG | OXYGEN SATURATION: 98 % | BODY MASS INDEX: 44.3 KG/M2

## 2025-07-02 PROCEDURE — 99213 OFFICE O/P EST LOW 20 MIN: CPT
